# Patient Record
Sex: FEMALE | Race: WHITE | NOT HISPANIC OR LATINO | Employment: UNEMPLOYED | URBAN - METROPOLITAN AREA
[De-identification: names, ages, dates, MRNs, and addresses within clinical notes are randomized per-mention and may not be internally consistent; named-entity substitution may affect disease eponyms.]

---

## 2018-01-18 NOTE — PATIENT INSTRUCTIONS
[email or web address]  To help find a counselor.      Welcome to [facility], where we are committed to the art of inspired primary care.  Thank you for choosing us to be a part of your well-being.    The clinic is open Monday through Friday, 8:00 a.m. - 5:00 p.m., and Saturdays from 8:00 a.m. - 12:00 p.m.  After-hours questions are directed to our 24-hour nurse line, which can be reached by calling the clinic at [phone].  You can also contact the clinic through [employer], our online patient portal.  (Please allow 1-2 business days for a response via [employer]xt[employer].)  If you are not already enrolled in [employer], access instructions are below.    If you need a refill on your prescription, please contact the pharmacy where you filled it, and they will contact our clinic with the details of what is needed.  If your prescription is a controlled substance, you will have a conversation with your provider to determine if you would like to  your prescription at the clinic or have it mailed to your pharmacy.  Please allow 2-3 business days for all refill requests to be handled.    We look forward to providing you with great care!    Preventive Health Recommendations  Female Ages 26 - 39  Yearly exam:   See your health care provider every year in order to    Review health changes.     Discuss preventive care.      Review your medicines if you your doctor has prescribed any.    Until age 30: Get a Pap test every three years (more often if you have had an abnormal result).    After age 30: Talk to your doctor about whether you should have a Pap test every 3 years or have a Pap test with HPV screening every 5 years.   You do not need a Pap test if your uterus was removed (hysterectomy) and you have not had cancer.  You should be tested each year for STDs (sexually transmitted diseases), if you're at risk.   Talk to your provider about how often to have your cholesterol checked.  If you are at  risk for diabetes, you should have a diabetes test (fasting glucose).  Shots: Get a flu shot each year. Get a tetanus shot every 10 years.   Nutrition:     Eat at least 5 servings of fruits and vegetables each day.    Eat whole-grain bread, whole-wheat pasta and brown rice instead of white grains and rice.    Talk to your provider about Calcium and Vitamin D.     Lifestyle    Exercise at least 150 minutes a week (30 minutes a day, 5 days of the week). This will help you control your weight and prevent disease.    Limit alcohol to one drink per day.    No smoking.     Wear sunscreen to prevent skin cancer.    See your dentist every six months for an exam and cleaning.    Preparing for Pregnancy  Even before you become pregnant, your health matters to your future baby. Adopt good health habits today. And take care of any medical problems you have before becoming pregnant.  Remember: As soon as you know you are pregnant, get regular prenatal care.   Things to consider  Read through the list below. The more items that describe you, the healthier you may be.    I eat a balanced diet with fruits, vegetables, and whole grains    I keep physically active.    I have my health problems under control.    My weight is about right.    I don t smoke.    I don t use recreational drugs.    I don t have a drinking problem.  Think about the following:    Who will help you through pregnancy and with childcare?    Do you have health insurance?    Do you have the money needed to cover childcare and other day-to-day child expenses?    Will you be able to take the time you need away from your job for maternity needs and childcare?  Adopt a healthy lifestyle  Each of the following tips can improve your health as you prepare for pregnancy:    Take a daily vitamin supplement that contains iron and folic acid (a vitamin that reduces the chance of some birth defects)     Eat a high-fiber diet rich in fruits and vegetables.    Exercise 3 or  more times a week and at least 150 minutes weekly.    Get within 15 pounds of your ideal weight.  The first weeks of pregnancy are the most important time in a baby s development. Before you become pregnant:    Don t use recreational drugs.    Don t drink alcohol.    Don t smoke.  Working with your healthcare provider  Your healthcare provider can help answer any questions you may have. Do you know when to stop taking birth control pills? Are any over-the-counter medicines safe for pregnant women? You can also ask about special care you may need if you have any of the following:    Sexually transmitted diseases (STDs), like herpes or chlamydia    Diabetes    High blood pressure    Other chronic health problems   Date Last Reviewed: 8/16/2015 2000-2017 [employer]. [address] [place] [address], [place] [address]. All rights reserved. This information is not intended as a substitute for professional medical care. Always follow your healthcare professional's instructions.

## 2018-01-18 NOTE — PROGRESS NOTES
SUBJECTIVE:   CC: Sherita Glasgow is an 32 year old woman new patient who presents for preventive health visit.   Her last physical was about a year ago.  Her most recent medical care has been in [place].  KENIA signed.    She has the following concerns she would like addressed today:  1. Migraines--needs refill-see below    2.  Prepregnancy:  She and her  are considering pregnancy in the next year and she wonders if there are any tests she needs to have done.  PAP's have always been normal.  Currently using condoms for contraception. Periods are very regular and she has been using an daniel to track ovulation.    3. Depression and anxiety: Depression / Anxiety    Onset: Has always struggled with mood issues but with the significant lifestyle changes she has had recently she is really noting an issue with anxiety and depression. She has never been hospitalized. She would really like to get in to see a counselor but is very frustrated with trying to find one.    Description:         Depression: YES        Anxiety: YES  Any recent familial/socialchanges: job change and recent move  Still participating in activities that you used to enjoy: YES  Problems with sleep: no  Any thoughts of hurting yourself or others: none  Able to fulfill responsibilities: yes    Accompanying Signs & Symptoms:   Fatigue: YES  Irritability: YES  Difficulty concentrating: YES  Changes in appetite: no  Heart racing/beating fast (tachycardia): no  Shortness of breath: no  Numbness: no    Precipitating and/or Alleviating factors:    Worse when going to school or work: no  Able to leave home: yes  Able to go in crowds: yes  Alcohol/drug use: no    History:                   Family history of depression: YES                 Family history of Anxiety: YES  History of hospitalization for depression: no    Therapies tried, side effects and outcome: None       PHQ-9 done (score): YES--see below    VESNA done(score): YES          GYN  history:  Menarche:14  Periods: regular, lasting 4-5days.  Flow described as moderate to light  no dysmenorrhea, no Dysparuneia  Currently sexually active: yes    Contraception:  None--interested in pregnancy in the next year or so  Patient's last menstrual period was 2018 (exact date).  Vaginal symptoms: none  Concern for STD: no    Accepts/requests STD testing: declines  History of abnormal Pap smear: NO - age 30-65 PAP every 5 years with negative HPV co-testing recommended      Healthy Habits:    Do you get at least three servings of calcium containing foods daily (dairy, green leafy vegetables, etc.)? no, taking calcium and/or vitamin D supplement: recommended and info sheet given    Amount of exercise or daily activities, outside of work: 5 day(s) per week 40 minutes/time    Problems taking medications regularly not applicable    Medication side effects: No    Have you had an eye exam in the past two years? yes    Do you see a dentist twice per year? yes    Do you have sleep apnea, excessive snoring or daytime drowsiness?no      Migraine Follow-Up:  Requests refill of Maxalt. Much more frequently over the past month. Taking the maxalt every couple days right now. Usually needs to take only one.    Headaches symptoms:  Worsened. Worse with period and with recent move.    Frequency: 3 days weekly for which she always treat with Maxalt which gives full relief.     Duration of headaches: 1 hour when she takes the Maxalt.  Sometimes headache last 12 hours    Able to do normal daily activities/work with migraines: No--misses work due to her HA 1-2 times per month    Rescue/Relief medication:Maxalt              Effectiveness: total relief    Preventative medication: None.  Has been on preventive medication in the past but it dfid not help not sure which one.    Neurologic complications: No new stroke-like symptoms, loss of vision or speech, numbness or weakness    In the past 4 weeks, how often have you gone  to Urgent Care or the emergency room because of your headaches?  0    VESNA-7 SCORE 1/29/2018   Total Score 13     PHQ-9 SCORE 1/29/2018 1/29/2018   Total Score 11 6     Today's PHQ-2 Score:   PHQ-2 ( 1999 [employer]) 1/29/2018 1/29/2018   Q1: Little interest or pleasure in doing things 1 1   Q2: Feeling down, depressed or hopeless 1 2   PHQ-2 Score 2 3       Patient Active Problem List    Diagnosis Date Noted     Migraine without aura and without status migrainosus, not intractable 01/29/2018     Priority: Medium     Adjustment disorder with mixed anxiety and depressed mood 01/29/2018     Priority: Medium       History reviewed. No pertinent past medical history.    Past Surgical History:   Procedure Laterality Date     ANKLE SURGERY  2008     COLONOSCOPY  2009     HERNIA REPAIR  1994       Family History   Problem Relation Age of Onset     Lung Cancer Paternal Grandmother      nonsmoker     Alzheimer Disease Paternal Grandfather      Coronary Artery Disease Early Onset No family hx of      Hypertension No family hx of      Hyperlipidemia No family hx of        Social History   Substance Use Topics     Smoking status: Never Smoker     Smokeless tobacco: Never Used     Alcohol use 1.2 oz/week     2 Standard drinks or equivalent per week       Social History     Social History Narrative    Lives with . Just moved to [place] from [place] area where she finished grad school.  She now has a new job and works remotely for a company in [place].  Her  took a job here in [place] and he is from [place].        Has a good support system.    Feels safe in all environments.    Wears seatbelt 100% of the time    Wears helmet while biking.    Denies history of abuse, past or present, physical, sexual or emotional.    Kimberly Razo PA-C    01/29/18            .       Current Outpatient Prescriptions   Medication Sig Dispense Refill     VITAMIN D, CHOLECALCIFEROL, PO Take 2,000 Units by mouth daily       rizatriptan (MAXALT) 10 MG  "tablet Take 1 tablet (10 mg) by mouth as needed for migraine 36 tablet 3                          Reviewed orders with patient.  Reviewed health maintenance and updated orders accordingly - Yes  Lipid discussed and patient declined.  STD testing declined.    Reviewed and updated as needed this visit by clinical staff  Tobacco  Allergies  Meds  Problems  Med Hx  Surg Hx  Fam Hx  Soc Hx          Reviewed and updated as needed this visit by Provider  Tobacco  Problems  Med Hx  Surg Hx  Fam Hx  Soc Hx         ROS:  C: NEGATIVE for fever, chills, change in weight  I: NEGATIVE for worrisome rashes, moles or lesions  E: NEGATIVE for vision changes or irritation  ENT: NEGATIVE for ear, mouth and throat problems  R: NEGATIVE for significant cough or SOB  B: NEGATIVE for masses, tenderness or discharge  CV: NEGATIVE for chest pain, palpitations or peripheral edema  GI: NEGATIVE for nausea, abdominal pain, heartburn, or change in bowel habits  : NEGATIVE for unusual urinary or vaginal symptoms. Periods are regular.  M: NEGATIVE for significant arthralgias or myalgia  N: NEGATIVE for weakness, dizziness or paresthesias  NEURO: POSITIVE for migraines as above  E: NEGATIVE for temperature intolerance, skin/hair changes  H: NEGATIVE for bleeding problems  PSYCHIATRIC: as above    OBJECTIVE:   /77 (BP Location: Right arm, Patient Position: Sitting, Cuff Size: Adult Large)  Pulse 55  Temp 98.1  F (36.7  C) (Oral)  Ht 5' 6.75\" (169.5 cm)  Wt 138 lb (62.6 kg)  LMP 01/08/2018 (Exact Date)  SpO2 98%  Breastfeeding? No  BMI 21.78 kg/m2  EXAM:  GENERAL: healthy, alert and no distress  EYES: Eyes grossly normal to inspection, PERRL and conjunctivae and sclerae normal  HENT: ear canals and TM's normal, nose and mouth without ulcers or lesions. No bruits  NECK: no adenopathy, no asymmetry, masses, or scars and thyroid normal to palpation  RESP: lungs clear to auscultation - no rales, rhonchi or wheezes  BREAST: " normal without masses, tenderness or nipple discharge and no palpable axillary masses or adenopathy  CV: regular rate and rhythm, normal S1 S2, no S3 or S4, no murmur, click or rub, no peripheral edema and peripheral pulses strong  ABDOMEN: soft, nontender, no hepatosplenomegaly, no masses and bowel sounds normal  MS: no gross musculoskeletal defects noted, no clubbing, edema or cyanosis of extremities.Pulses = and appropriate bilaterally to DP and PT  SKIN: no suspicious lesions or rashes  NEURO: Normal strength and tone, mentation intact and speech normal  PSYCH: well dressed and groomed.  Good eye contact and is cooperative. Thoughts linear.  No delusions, compulsions or paranoia.  Affect flat and slightly tearful.  Patient denies homicidal and suicidal ideation as well as no thoughts or actions of self-harm.    LYMPH: no cervical, supraclavicular, axillary, or inguinal adenopathy    ASSESSMENT/PLAN:      Diagnosis Comments   1. Routine general medical examination at a health care facility  See patient instructions   2. Migraine without aura and without status migrainosus, not intractable  rizatriptan (MAXALT) 10 MG tablet Meds refilled. Discussed controller medications and encouraged patient to consider. Meds refilled   3. Adjustment disorder with mixed anxiety and depressed mood  Information on counseling services discussed as well as [email or web address]  Encouraged patient to consider medications if she is not feeling better.  Vitamin D recommended   4. Encounter for preconception consultation  Info sheet given and discussed.  Reviewed physiology of ovulation and best time  And ingterval for intercourse.   Folic acid recommended to be started 3 months BEFORE attempting conception.  Healthy diet with plenty of calcium, fruits and vegetables.  Get plenty of sleep.  Limit alcohol and caffeine.  Regular exercise.   Follow up if you have any worsening or persistent problems or concerns.        COUNSELING:   Reviewed  "preventive health counseling, as reflected in patient instructions  Special attention given to:        Regular exercise       Healthy diet/nutrition       Immunizations    Will review records on arrival.           Contraception       Family planning       Folic Acid Counseling       Osteoporosis Prevention/Bone Health         reports that she has never smoked. She has never used smokeless tobacco.    Estimated body mass index is 21.78 kg/(m^2) as calculated from the following:    Height as of this encounter: 5' 6.75\" (169.5 cm).    Weight as of this encounter: 138 lb (62.6 kg).       Counseling Resources:  ATP IV Guidelines  Pooled Cohorts Equation Calculator  Breast Cancer Risk Calculator  FRAX Risk Assessment  ICSI Preventive Guidelines  Dietary Guidelines for Americans, 2010  USDA's MyPlate  ASA Prophylaxis  Lung CA Screening    Margot Razo PA-C  [facility]  "

## 2018-01-29 ENCOUNTER — OFFICE VISIT (OUTPATIENT)
Dept: FAMILY MEDICINE | Facility: CLINIC | Age: 33
End: 2018-01-29
Payer: COMMERCIAL

## 2018-01-29 ENCOUNTER — HEALTH MAINTENANCE LETTER (OUTPATIENT)
Age: 33
End: 2018-01-29

## 2018-01-29 VITALS
HEIGHT: 67 IN | BODY MASS INDEX: 21.66 KG/M2 | HEART RATE: 55 BPM | SYSTOLIC BLOOD PRESSURE: 112 MMHG | TEMPERATURE: 98.1 F | WEIGHT: 138 LBS | DIASTOLIC BLOOD PRESSURE: 77 MMHG | OXYGEN SATURATION: 98 %

## 2018-01-29 DIAGNOSIS — Z00.00 ROUTINE GENERAL MEDICAL EXAMINATION AT A HEALTH CARE FACILITY: Primary | ICD-10-CM

## 2018-01-29 DIAGNOSIS — Z31.69 ENCOUNTER FOR PRECONCEPTION CONSULTATION: ICD-10-CM

## 2018-01-29 DIAGNOSIS — F43.23 ADJUSTMENT DISORDER WITH MIXED ANXIETY AND DEPRESSED MOOD: ICD-10-CM

## 2018-01-29 DIAGNOSIS — G43.009 MIGRAINE WITHOUT AURA AND WITHOUT STATUS MIGRAINOSUS, NOT INTRACTABLE: ICD-10-CM

## 2018-01-29 RX ORDER — RIZATRIPTAN BENZOATE 10 MG/1
10 TABLET ORAL PRN
Qty: 36 TABLET | Refills: 3 | Status: SHIPPED | OUTPATIENT
Start: 2018-01-29 | End: 2019-01-22

## 2018-01-29 ASSESSMENT — ANXIETY QUESTIONNAIRES
1. FEELING NERVOUS, ANXIOUS, OR ON EDGE: MORE THAN HALF THE DAYS
2. NOT BEING ABLE TO STOP OR CONTROL WORRYING: MORE THAN HALF THE DAYS
5. BEING SO RESTLESS THAT IT IS HARD TO SIT STILL: NOT AT ALL
3. WORRYING TOO MUCH ABOUT DIFFERENT THINGS: MORE THAN HALF THE DAYS
6. BECOMING EASILY ANNOYED OR IRRITABLE: NEARLY EVERY DAY
7. FEELING AFRAID AS IF SOMETHING AWFUL MIGHT HAPPEN: MORE THAN HALF THE DAYS
GAD7 TOTAL SCORE: 13
IF YOU CHECKED OFF ANY PROBLEMS ON THIS QUESTIONNAIRE, HOW DIFFICULT HAVE THESE PROBLEMS MADE IT FOR YOU TO DO YOUR WORK, TAKE CARE OF THINGS AT HOME, OR GET ALONG WITH OTHER PEOPLE: VERY DIFFICULT

## 2018-01-29 ASSESSMENT — PAIN SCALES - GENERAL: PAINLEVEL: MILD PAIN (3)

## 2018-01-29 ASSESSMENT — PATIENT HEALTH QUESTIONNAIRE - PHQ9: 5. POOR APPETITE OR OVEREATING: MORE THAN HALF THE DAYS

## 2018-01-29 NOTE — NURSING NOTE
"32 year old  Chief Complaint   Patient presents with     [facility]     Physical     had coffee       Blood pressure 112/77, pulse 55, temperature 98.1  F (36.7  C), temperature source Oral, height 5' 6.75\" (169.5 cm), weight 138 lb (62.6 kg), last menstrual period 01/08/2018, SpO2 98 %, not currently breastfeeding. Body mass index is 21.78 kg/(m^2).  There is no problem list on file for this patient.      Wt Readings from Last 2 Encounters:   01/29/18 138 lb (62.6 kg)     BP Readings from Last 3 Encounters:   01/29/18 112/77         Current Outpatient Prescriptions   Medication     Rizatriptan Benzoate (MAXALT PO)     VITAMIN D, CHOLECALCIFEROL, PO     No current facility-administered medications for this visit.        Social History   Substance Use Topics     Smoking status: Never Smoker     Smokeless tobacco: Never Used     Alcohol use Yes       Health Maintenance Due   Topic Date Due     TETANUS IMMUNIZATION (SYSTEM ASSIGNED)  09/06/2005     PAP SCREENING Q3 YR (SYSTEM ASSIGNED)  09/06/2008     INFLUENZA VACCINE (SYSTEM ASSIGNED)  09/01/2017       No results found for: MAAME [email or web address]  January 29, 2018 8:47 AM    "

## 2018-01-29 NOTE — MR AVS SNAPSHOT
After Visit Summary   1/29/2018    Sherita Glasgow    MRN: [id number]           Patient Information     Date Of Birth          1985        Visit Information        Provider Department      1/29/2018 8:40 AM Margot Razo PA-C [facility]        Today's Diagnoses     Routine general medical examination at a health care facility    -  1    Screening for cervical cancer        Screening for lipid disorders        Need for Tdap vaccination        Migraine without aura and without status migrainosus, not intractable          Care Instructions     [email or web address]  To help find a counselor.      Welcome to [facility], where we are committed to the art of inspired primary care.  Thank you for choosing us to be a part of your well-being.    The clinic is open Monday through Friday, 8:00 a.m. - 5:00 p.m., and Saturdays from 8:00 a.m. - 12:00 p.m.  After-hours questions are directed to our 24-hour nurse line, which can be reached by calling the clinic at [phone].  You can also contact the clinic through Stormpulse, our online patient portal.  (Please allow 1-2 business days for a response via Stormpulse.)  If you are not already enrolled in Stormpulse, access instructions are below.    If you need a refill on your prescription, please contact the pharmacy where you filled it, and they will contact our clinic with the details of what is needed.  If your prescription is a controlled substance, you will have a conversation with your provider to determine if you would like to  your prescription at the clinic or have it mailed to your pharmacy.  Please allow 2-3 business days for all refill requests to be handled.    We look forward to providing you with great care!    Preventive Health Recommendations  Female Ages 26 - 39  Yearly exam:   See your health care provider every year in order to    Review health changes.     Discuss preventive care.      Review your  medicines if you your doctor has prescribed any.    Until age 30: Get a Pap test every three years (more often if you have had an abnormal result).    After age 30: Talk to your doctor about whether you should have a Pap test every 3 years or have a Pap test with HPV screening every 5 years.   You do not need a Pap test if your uterus was removed (hysterectomy) and you have not had cancer.  You should be tested each year for STDs (sexually transmitted diseases), if you're at risk.   Talk to your provider about how often to have your cholesterol checked.  If you are at risk for diabetes, you should have a diabetes test (fasting glucose).  Shots: Get a flu shot each year. Get a tetanus shot every 10 years.   Nutrition:     Eat at least 5 servings of fruits and vegetables each day.    Eat whole-grain bread, whole-wheat pasta and brown rice instead of white grains and rice.    Talk to your provider about Calcium and Vitamin D.     Lifestyle    Exercise at least 150 minutes a week (30 minutes a day, 5 days of the week). This will help you control your weight and prevent disease.    Limit alcohol to one drink per day.    No smoking.     Wear sunscreen to prevent skin cancer.    See your dentist every six months for an exam and cleaning.    Preparing for Pregnancy  Even before you become pregnant, your health matters to your future baby. Adopt good health habits today. And take care of any medical problems you have before becoming pregnant.  Remember: As soon as you know you are pregnant, get regular prenatal care.   Things to consider  Read through the list below. The more items that describe you, the healthier you may be.    I eat a balanced diet with fruits, vegetables, and whole grains    I keep physically active.    I have my health problems under control.    My weight is about right.    I don t smoke.    I don t use recreational drugs.    I don t have a drinking problem.  Think about the following:    Who will help  you through pregnancy and with childcare?    Do you have health insurance?    Do you have the money needed to cover childcare and other day-to-day child expenses?    Will you be able to take the time you need away from your job for maternity needs and childcare?  Adopt a healthy lifestyle  Each of the following tips can improve your health as you prepare for pregnancy:    Take a daily vitamin supplement that contains iron and folic acid (a vitamin that reduces the chance of some birth defects)     Eat a high-fiber diet rich in fruits and vegetables.    Exercise 3 or more times a week and at least 150 minutes weekly.    Get within 15 pounds of your ideal weight.  The first weeks of pregnancy are the most important time in a baby s development. Before you become pregnant:    Don t use recreational drugs.    Don t drink alcohol.    Don t smoke.  Working with your healthcare provider  Your healthcare provider can help answer any questions you may have. Do you know when to stop taking birth control pills? Are any over-the-counter medicines safe for pregnant women? You can also ask about special care you may need if you have any of the following:    Sexually transmitted diseases (STDs), like herpes or chlamydia    Diabetes    High blood pressure    Other chronic health problems   Date Last Reviewed: 8/16/2015 2000-2017 [employer]. [address] [place] [address]. All rights reserved. This information is not intended as a substitute for professional medical care. Always follow your healthcare professional's instructions.                Follow-ups after your visit        Your next 10 appointments already scheduled     Feb 06, 2018  1:00 PM CST   New Patient Visit with BINH Schmitt CNP   [facility] ([facility])    [facility] [address],[place] [address] [facility] [address] [phone]              Who to contact      "Please call your clinic at [phone] to:    Ask questions about your health    Make or cancel appointments    Discuss your medicines    Learn about your test results    Speak to your doctor   If you have compliments or concerns about an experience at your clinic, or if you wish to file a complaint, please contact [facility] Patient Relations at [phone] or email us at [email or web address]         Additional Information About Your Visit        Phreesiahart Information     [employer] gives you secure access to your electronic health record. If you see a primary care provider, you can also send messages to your care team and make appointments. If you have questions, please call your primary care clinic.  If you do not have a primary care provider, please call [phone] and they will assist you.      [email or web address] is an electronic gateway that provides easy, online access to your medical records. With [email or web address], you can request a clinic appointment, read your test results, renew a prescription or communicate with your care team.     To access your existing account, please contact your [facility] or call [phone] for assistance.        Care EveryWhere ID     This is your Care EveryWhere ID. This could be used by other organizations to access your [facility] medical records  [id number]        Your Vitals Were     Pulse Temperature Height Last Period Pulse Oximetry Breastfeeding?    55 98.1  F (36.7  C) (Oral) 5' 6.75\" (169.5 cm) 01/08/2018 (Exact Date) 98% No    BMI (Body Mass Index)                   21.78 kg/m2            Blood Pressure from Last 3 Encounters:   01/29/18 112/77    Weight from Last 3 Encounters:   01/29/18 138 lb (62.6 kg)              Today, you had the following     No orders found for display         Today's Medication Changes          These changes are accurate as of 1/29/18  9:43 AM.  If you have any questions, ask your nurse or " doctor.               These medicines have changed or have updated prescriptions.        Dose/Directions    rizatriptan 10 MG tablet   Commonly known as:  MAXALT   This may have changed:  medication strength   Used for:  Migraine without aura and without status migrainosus, not intractable   Changed by:  Margot Razo PA-C        Dose:  10 mg   Take 1 tablet (10 mg) by mouth as needed for migraine   Quantity:  36 tablet   Refills:  3            Where to get your medicines      These medications were sent to [facility] [address] [place] [address] & [address] [address]     Phone:  [phone]     rizatriptan 10 MG tablet                Primary Care Provider Office Phone # Fax #    Margot Razo PA-C [phone]       [address] [place] [address]        Equal Access to Services     [facility]Y [facility] : Hadii ngozi gaytan hadasho Soomaali, waaxda luqadaha, qaybta kaalmada adeegyada, ashley fossin haysandy muro . So [facility] [phone].    ATENCIÓN: Si habla español, tiene a zhao disposición servicios gratuitos de asistencia lingüística. Kashif al [phone].    We comply with applicable federal civil rights laws and [place] laws. We do not discriminate on the basis of race, color, national origin, age, disability, sex, sexual orientation, or gender identity.            Thank you!     Thank you for choosing [facility]  for your care. Our goal is always to provide you with excellent care. Hearing back from our patients is one way we can continue to improve our services. Please take a few minutes to complete the written survey that you may receive in the mail after your visit with us. Thank you!             Your Updated Medication List - Protect others around you: Learn how to safely use, store and throw away your medicines at www.disposemymeds.org.          This list is accurate as of 1/29/18  9:43  AM.  Always use your most recent med list.                   Brand Name Dispense Instructions for use Diagnosis    rizatriptan 10 MG tablet    MAXALT    36 tablet    Take 1 tablet (10 mg) by mouth as needed for migraine    Migraine without aura and without status migrainosus, not intractable       VITAMIN D (CHOLECALCIFEROL) PO      Take 2,000 Units by mouth daily

## 2018-01-30 ASSESSMENT — PATIENT HEALTH QUESTIONNAIRE - PHQ9: SUM OF ALL RESPONSES TO PHQ QUESTIONS 1-9: 6

## 2018-01-30 ASSESSMENT — ANXIETY QUESTIONNAIRES: GAD7 TOTAL SCORE: 13

## 2018-02-20 ASSESSMENT — ENCOUNTER SYMPTOMS
INCREASED ENERGY: 1
BLOATING: 1
PANIC: 1
HEADACHES: 1
NERVOUS/ANXIOUS: 1
DEPRESSION: 1
DECREASED CONCENTRATION: 1
CONSTIPATION: 1
DIARRHEA: 1

## 2018-02-20 ASSESSMENT — ANXIETY QUESTIONNAIRES
1. FEELING NERVOUS, ANXIOUS, OR ON EDGE: MORE THAN HALF THE DAYS
GAD7 TOTAL SCORE: 10
5. BEING SO RESTLESS THAT IT IS HARD TO SIT STILL: NOT AT ALL
7. FEELING AFRAID AS IF SOMETHING AWFUL MIGHT HAPPEN: SEVERAL DAYS
2. NOT BEING ABLE TO STOP OR CONTROL WORRYING: SEVERAL DAYS
6. BECOMING EASILY ANNOYED OR IRRITABLE: NEARLY EVERY DAY
4. TROUBLE RELAXING: SEVERAL DAYS
7. FEELING AFRAID AS IF SOMETHING AWFUL MIGHT HAPPEN: SEVERAL DAYS
GAD7 TOTAL SCORE: 10
3. WORRYING TOO MUCH ABOUT DIFFERENT THINGS: MORE THAN HALF THE DAYS

## 2018-02-20 ASSESSMENT — PATIENT HEALTH QUESTIONNAIRE - PHQ9
SUM OF ALL RESPONSES TO PHQ QUESTIONS 1-9: 7
10. IF YOU CHECKED OFF ANY PROBLEMS, HOW DIFFICULT HAVE THESE PROBLEMS MADE IT FOR YOU TO DO YOUR WORK, TAKE CARE OF THINGS AT HOME, OR GET ALONG WITH OTHER PEOPLE: VERY DIFFICULT
SUM OF ALL RESPONSES TO PHQ QUESTIONS 1-9: 7

## 2018-02-23 ENCOUNTER — OFFICE VISIT (OUTPATIENT)
Dept: OBGYN | Facility: CLINIC | Age: 33
End: 2018-02-23
Attending: NURSE PRACTITIONER
Payer: COMMERCIAL

## 2018-02-23 VITALS
SYSTOLIC BLOOD PRESSURE: 118 MMHG | WEIGHT: 139.2 LBS | HEART RATE: 74 BPM | DIASTOLIC BLOOD PRESSURE: 78 MMHG | HEIGHT: 67 IN | BODY MASS INDEX: 21.85 KG/M2

## 2018-02-23 DIAGNOSIS — R53.83 FATIGUE, UNSPECIFIED TYPE: ICD-10-CM

## 2018-02-23 DIAGNOSIS — F43.23 ADJUSTMENT DISORDER WITH MIXED ANXIETY AND DEPRESSED MOOD: ICD-10-CM

## 2018-02-23 DIAGNOSIS — Z13.21 ENCOUNTER FOR VITAMIN DEFICIENCY SCREENING: ICD-10-CM

## 2018-02-23 DIAGNOSIS — Z13.220 SCREENING FOR LIPOID DISORDERS: ICD-10-CM

## 2018-02-23 DIAGNOSIS — Z01.419 ENCOUNTER FOR GYNECOLOGICAL EXAMINATION WITHOUT ABNORMAL FINDING: ICD-10-CM

## 2018-02-23 DIAGNOSIS — Z00.00 VISIT FOR PREVENTIVE HEALTH EXAMINATION: Primary | ICD-10-CM

## 2018-02-23 DIAGNOSIS — Z31.69 ENCOUNTER FOR PRECONCEPTION CONSULTATION: ICD-10-CM

## 2018-02-23 DIAGNOSIS — Z13.1 SCREENING FOR DIABETES MELLITUS: ICD-10-CM

## 2018-02-23 DIAGNOSIS — Z13.29 SCREENING FOR THYROID DISORDER: ICD-10-CM

## 2018-02-23 DIAGNOSIS — Z12.4 SCREENING FOR MALIGNANT NEOPLASM OF CERVIX: ICD-10-CM

## 2018-02-23 LAB
CHOLEST SERPL-MCNC: 161 MG/DL
DEPRECATED CALCIDIOL+CALCIFEROL SERPL-MC: 42 UG/L (ref 20–75)
GLUCOSE SERPL-MCNC: 91 MG/DL (ref 70–99)
HBA1C MFR BLD: 5.2 % (ref 4.3–6)
HDLC SERPL-MCNC: 75 MG/DL
HGB BLD-MCNC: 13.5 G/DL (ref 11.7–15.7)
LDLC SERPL CALC-MCNC: 77 MG/DL
NONHDLC SERPL-MCNC: 86 MG/DL
TRIGL SERPL-MCNC: 44 MG/DL
TSH SERPL DL<=0.005 MIU/L-ACNC: 1.54 MU/L (ref 0.4–4)
VIT B12 SERPL-MCNC: 284 PG/ML (ref 193–986)

## 2018-02-23 PROCEDURE — 82947 ASSAY GLUCOSE BLOOD QUANT: CPT | Performed by: NURSE PRACTITIONER

## 2018-02-23 PROCEDURE — 82607 VITAMIN B-12: CPT | Performed by: NURSE PRACTITIONER

## 2018-02-23 PROCEDURE — G0463 HOSPITAL OUTPT CLINIC VISIT: HCPCS | Mod: ZF

## 2018-02-23 PROCEDURE — G0145 SCR C/V CYTO,THINLAYER,RESCR: HCPCS | Performed by: NURSE PRACTITIONER

## 2018-02-23 PROCEDURE — 84443 ASSAY THYROID STIM HORMONE: CPT | Performed by: NURSE PRACTITIONER

## 2018-02-23 PROCEDURE — 85018 HEMOGLOBIN: CPT | Performed by: NURSE PRACTITIONER

## 2018-02-23 PROCEDURE — 87624 HPV HI-RISK TYP POOLED RSLT: CPT | Performed by: NURSE PRACTITIONER

## 2018-02-23 PROCEDURE — 83036 HEMOGLOBIN GLYCOSYLATED A1C: CPT | Performed by: NURSE PRACTITIONER

## 2018-02-23 PROCEDURE — 82306 VITAMIN D 25 HYDROXY: CPT | Performed by: NURSE PRACTITIONER

## 2018-02-23 PROCEDURE — 80061 LIPID PANEL: CPT | Performed by: NURSE PRACTITIONER

## 2018-02-23 PROCEDURE — 36415 COLL VENOUS BLD VENIPUNCTURE: CPT | Performed by: NURSE PRACTITIONER

## 2018-02-23 ASSESSMENT — ANXIETY QUESTIONNAIRES
5. BEING SO RESTLESS THAT IT IS HARD TO SIT STILL: NOT AT ALL
1. FEELING NERVOUS, ANXIOUS, OR ON EDGE: NEARLY EVERY DAY
6. BECOMING EASILY ANNOYED OR IRRITABLE: NEARLY EVERY DAY
2. NOT BEING ABLE TO STOP OR CONTROL WORRYING: NEARLY EVERY DAY
3. WORRYING TOO MUCH ABOUT DIFFERENT THINGS: NEARLY EVERY DAY
GAD7 TOTAL SCORE: 16
7. FEELING AFRAID AS IF SOMETHING AWFUL MIGHT HAPPEN: SEVERAL DAYS

## 2018-02-23 ASSESSMENT — PATIENT HEALTH QUESTIONNAIRE - PHQ9: 5. POOR APPETITE OR OVEREATING: NEARLY EVERY DAY

## 2018-02-23 NOTE — PROGRESS NOTES
"  Progress Note    SUBJECTIVE:  Sherita Glasgow is an 32 year old, , who requests an Annual Gyn Exam.   Patient had an annual preventative exam with breast exam done with her PCP on 2018.      Concerns today include:   1. Preconception counseling: Considering a pregnancy in the next year.  Currently using condoms, NFP and the pull out method.  Menses are regular (see hx below).    2. Hx of anxiety and depression.   PHQ-9 SCORE 2018   Total Score MyChart - 7 (Mild depression) -   Total Score 6 7 8     VESNA-7 SCORE 2018   Total Score - 10 (moderate anxiety) -   Total Score 13 10 16     Reports feeling fatigued and like she \"could sleep all the time.\"  Sleeps well during the night; denies trouble falling or staying asleep.  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  She and her  are getting settled in their home.  Her 's family lives in [place]. Expresses sadness for leaving her family who lives on the [facility].  Feels safe. Denies suicidal thoughts.  Plans to establish care with a psychologist; has this appointment scheduled.  Never been on medication to control symptoms, but starting to feel open to this.      Menstrual History:  Menstrual History 2018   LAST MENSTRUAL PERIOD 2018 -   Menarche age - - 18   Period Cycle (Days) - - Q 3.5 weeks   Period Duration (Days) - - 4 days   Method of Contraception - - None   Period Pattern - - Regular   Menstrual Flow - - Moderate   Dysmenorrhea - - None   Reviewed Today - - Yes     Last pap smear: pt unsure, done in [place]  History of abnormal Pap smear: NO - age 30-65 PAP every 5 years with negative HPV co-testing recommended    Mammogram current: n/a  Last Colonoscopy: n/a    Sexual Hx: denies sexual concerns or " dyspareunia    HISTORY:    Current Outpatient Prescriptions on File Prior to Visit:  VITAMIN D, CHOLECALCIFEROL, PO Take 2,000 Units by mouth daily   rizatriptan (MAXALT) 10 MG tablet Take 1 tablet (10 mg) by mouth as needed for migraine     No current facility-administered medications on file prior to visit.   No Known Allergies    There is no immunization history on file for this patient.  S/p flu vaccine  Obstetric History       T0      L0     SAB0   TAB0   Ectopic0   Multiple0   Live Births0      Past Medical History:   Diagnosis Date     Migraines     without aura   (takes Maxalt PRN)    Past Surgical History:   Procedure Laterality Date     ANKLE SURGERY       COLONOSCOPY      normal result; done due to digestive issues     HERNIA REPAIR       Family History   Problem Relation Age of Onset     Lung Cancer Paternal Grandmother      nonsmoker     Breast Cancer Paternal Grandmother      over age 50     Alzheimer Disease Paternal Grandfather      Coronary Artery Disease Early Onset No family hx of      Hypertension No family hx of      Hyperlipidemia No family hx of      DIABETES No family hx of      Social History     Social History     Marital status:      Spouse name: N/A     Number of children: N/A     Years of education: N/A     Social History Main Topics     Smoking status: Never Smoker     Smokeless tobacco: Never Used     Alcohol use 1.2 oz/week      Comment: 3-5 drinks/week     Drug use: No     Sexual activity: Yes     Partners: Male     Birth control/ protection: Pull-out method     Other Topics Concern     Parent/Sibling W/ [place], Mi Or Angioplasty Before 65f 55m? No     Social History Narrative    Lives with . Just moved to [place] from [place] area where she finished grad school.  She now has a new job and works remotely for a company in [place].  Her  took a job here in [place] and he is from [place].        Has a good support system.    Feels safe in all  "environments.    Wears seatbelt 100% of the time    Wears helmet while biking.    Denies history of abuse, past or present, physical, sexual or emotional.    Kimberly Razo PA-C    01/29/18         ROS  ROS: 10 point ROS neg other than the symptoms noted above in the HPI.      EXAM:  Blood pressure 118/78, pulse 74, height 1.695 m (5' 6.75\"), weight 63.1 kg (139 lb 3.2 oz), last menstrual period 02/02/2018, not currently breastfeeding. Body mass index is 21.97 kg/(m^2).  General - pleasant female in no acute distress.  Musculoskeletal - no gross deformities.  Neurological - normal strength, sensation, and mental status.    Pelvic Exam:  EG/BUS: Normal genital architecture without lesions, erythema or abnormal secretions; Bartholin's, Urethra, Antonito's normal   Urethral meatus: normal   Urethra: no masses, tenderness, or scarring   Bladder: no masses or tenderness   Vagina: moist, pink, rugae with creamy, white and odorless secretions  Cervix: Nulliparous, and pink, moist, closed, without lesion or CMT  Uterus: anteverted,  and small, smooth, firm, mobile w/o pain  Adnexa: Within normal limits and No masses, nodularity, tenderness  Rectum: anus normal     ASSESSMENT:  Encounter Diagnoses   Name Primary?     Visit for preventive health examination Yes     Screening for diabetes mellitus      Screening for lipoid disorders      Encounter for vitamin deficiency screening      Screening for malignant neoplasm of cervix      Encounter for gynecological examination without abnormal finding      Screening for thyroid disorder      Adjustment disorder with mixed anxiety and depressed mood      Fatigue, unspecified type      Encounter for preconception consultation         PLAN:   Orders Placed This Encounter   Procedures     Pelvic and Breast Exam Procedure []     Pap Smear Exam [] Do Not Remove     Pap imaged thin layer screen with HPV - recommended age 30 - 65 years (select HPV order below)     HPV High Risk Types " DNA Cervical     Hemoglobin A1c [LAB90]     Fasting Glucose [UEE0926]     TSH with free T4 reflex     Lipid panel reflex to direct LDL Fasting     25- OH-Vitamin D     Vitamin B12     Hemoglobin     Preventative health labs drawn, as well as labs to evaluate for cause of fatigue.   Preconception counseling completed. Encouraged Sherita to start taking a prenatal vitamin daily.  Counseled Sherita that Maxalt is a Category C medication in pregnancy; when she becomes pregnant, can readdress with provider whether she should continue this medication or if there is a better alternative, if it is still needed.  Instructed Sherita to abstain from alcohol when pregnant.   Pap smear with HPV testing completed today.  Patient declined STD testing today.   Discussed mental health with Sherita. She reports that she feels her symptoms are worsening given so many changes, adjusting to a new setting, and being away from her family.  Sherita declined medication therapy at this time. She desires to start psychotherapy at this time. Patient to consider returning to clinic in 2 months for mental health follow-up, or sooner, if she desires to initiate medication.  Additional teaching done at this visit regarding calcium (1200 mg per day), self breast exam, exercise, mental health and weight/diet.    Return to clinic in one year or if she achieves pregnancy.  Follow-up as needed.    Haley Arias, DNP, APRN, WHNP

## 2018-02-23 NOTE — PATIENT INSTRUCTIONS
Please go to the lab after today's appointment to have your labs drawn  A pap smear with HPV testing was done today. If this pap smear is normal, your next pap smear will be due in 5 years.  Encouraged initiation of care with a psychologist, as you have scheduled. Considering returning to clinic in 2 months to check in on your mental health symptoms. May return sooner if you desire initiation of a medication.    It is recommended that you start a prenatal vitamin today and continue taking 1 daily through pregnancy and postpartum.   Return to clinic when you have achieved a pregnancy (around 8 weeks) or in 1 year for a preventative health exam.     PREVENTIVE HEALTH RECOMMENDATIONS:   Most women need a yearly breast and pelvic exam.    A PAP screen, a test done DURING a pelvic exam, is NO longer recommended yearly.    March 2013, screening guidelines recommended by ACOG for PAP screen are:    1) First pap at age 21.    2) Pap every 3 years until age 30.    3) After age 30, pap every 3 years or Pap with HR HPV screen every 5 years until age 65.  4) Women do NOT need a vaginal Pap screen after a hysterectomy (surgical removal of the uterus) when they have not had cancer.    Exceptions:  1) Yearly pap if HIV+ or immunosuppressed secondary to organ transplant  2) ANTHONY II-III continue routine screening for 20 years.    I encourage you continue looking for opportunities to choose a healthy lifestyle:       * Choose to eat a heart healthy diet. Check out the FOOD PLATE guidelines at: http://www.choosemyplate.gov/ for helpful hints on weight and cholesterol management.  Balance your caloric intake with exercise to maintain a BMI in the 22 to 26 range. For bone health: Eat calcium-rich foods like yogurt, broccoli or take chewable calcium pills (500 to 600 mg) twice a day with food.       * Exercise for at least an average of 30 minutes a day, 5 days of the week. This will help you control your weight, release stress, and help  prevent disease.      * Take a Vitamin D3 supplement daily fall through spring and during summer unless you [id number]' full body sun exposure to skin without sunscreen.      * DO wear sunscreen to prevent skin cancer after the first 15-30 minutes.      * Identify stressors in your life, find ways to release the stress, and, make time for yourself. PLEASE ask for help if mood changes last longer than two weeks.     * Limit alcohol to one drink per day.  No smoking.  Avoid second hand smoke. If you smoke, ask for help to stop.       *  If you are in a sexual relationship, talk with your partner about possible infection risks and take action to protect yourself from exposure to a sexual infection.    Please request an infection screen for STIs (sexually transmitted infections) if you are less than age 26 OR believe that you may be at risk.     Get a flu shot each year. Get a tetanus shot every 10 years. EVERYONE needs a pertussis (Whooping cough) booster.    See your dentist twice a year for an exam and preventive care cleaning.     Consider the following screen tests:    1) cholesterol test every 5 years.     2) yearly mammogram after age 40 unless you have identified risks.    3) colonoscopy every 10 years after age 50 unless you have identified risks.    4) diabetes blood test screening if you are at risk for diabetes.      Additional information that you may also find helpful:  The Internet now gives us access to LOTS of information -- some of it helpful, research documented and also plenty of harmful, anecdotal information that may not pertain to your situtaion. Consider visiting the following websites for accurate health information:    www.vitamindcouncil.org/ : Info and ongoing research re Vitamin D    www.fairview.org : Up to date and easily searchable information on multiple topics.    www.medlineplus.gov : medication info, interactive tutorials, watch real surgeries online    www.cdc.gov : public health  info, travel advisories, epidemics (H1N1)    www.binu/std.org: current research re diagnosis, treatment and prevention of sexually contacted infections.    www.health.[facility].[place] : [place] dept of heatlh, public health issues in [place], N1N1    www.familydoctor.org : good info from the Academy of Family Physicians

## 2018-02-23 NOTE — LETTER
"2018       RE: Sherita Glasgow  [address]  [address]  [place] [address]     Dear Colleague,    Thank you for referring your patient, Sherita Glasgow, to the [facility] at [facility]. Please see a copy of my visit note below.      Progress Note    SUBJECTIVE:  Sherita Glasgow is an 32 year old, , who requests an Annual Gyn Exam.   Patient had an annual preventative exam with breast exam done with her PCP on 2018.      Concerns today include:   1. Preconception counseling: Considering a pregnancy in the next year.  Currently using condoms, NFP and the pull out method.  Menses are regular (see hx below).    2. Hx of anxiety and depression.   PHQ-9 SCORE 2018   Total Score MyChart - 7 (Mild depression) -   Total Score 6 7 8     VESNA-7 SCORE 2018   Total Score - 10 (moderate anxiety) -   Total Score 13 10 16     Reports feeling fatigued and like she \"could sleep all the time.\"  Sleeps well during the night; denies trouble falling or staying asleep.  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  She and her  are getting settled in their home.  Her 's family lives in [place]. Expresses sadness for leaving her family who lives on the [facility].  Feels safe. Denies suicidal thoughts.  Plans to establish care with a psychologist; has this appointment scheduled.  Never been on medication to control symptoms, but starting to feel open to this.      Menstrual History:  Menstrual History 2018   LAST MENSTRUAL PERIOD 2018 -   Menarche age - - 18   Period Cycle (Days) - - Q 3.5 weeks   Period Duration (Days) - - 4 days   Method of Contraception - - None   Period Pattern - - Regular   Menstrual Flow - - Moderate "   Dysmenorrhea - - None   Reviewed Today - - Yes     Last pap smear: pt unsure, done in [place]  History of abnormal Pap smear: NO - age 30-65 PAP every 5 years with negative HPV co-testing recommended    Mammogram current: n/a  Last Colonoscopy: n/a    Sexual Hx: denies sexual concerns or dyspareunia    HISTORY:    Current Outpatient Prescriptions on File Prior to Visit:  VITAMIN D, CHOLECALCIFEROL, PO Take 2,000 Units by mouth daily   rizatriptan (MAXALT) 10 MG tablet Take 1 tablet (10 mg) by mouth as needed for migraine     No current facility-administered medications on file prior to visit.   No Known Allergies    There is no immunization history on file for this patient.  S/p flu vaccine  Obstetric History       T0      L0     SAB0   TAB0   Ectopic0   Multiple0   Live Births0      Past Medical History:   Diagnosis Date     Migraines     without aura   (takes Maxalt PRN)    Past Surgical History:   Procedure Laterality Date     ANKLE SURGERY       COLONOSCOPY      normal result; done due to digestive issues     HERNIA REPAIR       Family History   Problem Relation Age of Onset     Lung Cancer Paternal Grandmother      nonsmoker     Breast Cancer Paternal Grandmother      over age 50     Alzheimer Disease Paternal Grandfather      Coronary Artery Disease Early Onset No family hx of      Hypertension No family hx of      Hyperlipidemia No family hx of      DIABETES No family hx of      Social History     Social History     Marital status:      Spouse name: N/A     Number of children: N/A     Years of education: N/A     Social History Main Topics     Smoking status: Never Smoker     Smokeless tobacco: Never Used     Alcohol use 1.2 oz/week      Comment: 3-5 drinks/week     Drug use: No     Sexual activity: Yes     Partners: Male     Birth control/ protection: Pull-out method     Other Topics Concern     Parent/Sibling W/ Cabg, Mi Or Angioplasty Before 65f 55m? No     Social History  "Narrative    Lives with . Just moved to [place] from [place] area where she finished grad school.  She now has a new job and works remotely for a company in [place].  Her  took a job here in [place] and he is from [place].        Has a good support system.    Feels safe in all environments.    Wears seatbelt 100% of the time    Wears helmet while biking.    Denies history of abuse, past or present, physical, sexual or emotional.    Kimberly Razo PA-C    01/29/18         ROS  ROS: 10 point ROS neg other than the symptoms noted above in the HPI.      EXAM:  Blood pressure 118/78, pulse 74, height 1.695 m (5' 6.75\"), weight 63.1 kg (139 lb 3.2 oz), last menstrual period 02/02/2018, not currently breastfeeding. Body mass index is 21.97 kg/(m^2).  General - pleasant female in no acute distress.  Musculoskeletal - no gross deformities.  Neurological - normal strength, sensation, and mental status.    Pelvic Exam:  EG/BUS: Normal genital architecture without lesions, erythema or abnormal secretions; Bartholin's, Urethra, Iron Horse's normal   Urethral meatus: normal   Urethra: no masses, tenderness, or scarring   Bladder: no masses or tenderness   Vagina: moist, pink, rugae with creamy, white and odorless secretions  Cervix: Nulliparous, and pink, moist, closed, without lesion or CMT  Uterus: anteverted,  and small, smooth, firm, mobile w/o pain  Adnexa: Within normal limits and No masses, nodularity, tenderness  Rectum: anus normal     ASSESSMENT:  Encounter Diagnoses   Name Primary?     Visit for preventive health examination Yes     Screening for diabetes mellitus      Screening for lipoid disorders      Encounter for vitamin deficiency screening      Screening for malignant neoplasm of cervix      Encounter for gynecological examination without abnormal finding      Screening for thyroid disorder      Adjustment disorder with mixed anxiety and depressed mood      Fatigue, unspecified type      Encounter for preconception " consultation         PLAN:   Orders Placed This Encounter   Procedures     Pelvic and Breast Exam Procedure []     Pap Smear Exam [] Do Not Remove     Pap imaged thin layer screen with HPV - recommended age 30 - 65 years (select HPV order below)     HPV High Risk Types DNA Cervical     Hemoglobin A1c [LAB90]     Fasting Glucose [LLK0134]     TSH with free T4 reflex     Lipid panel reflex to direct LDL Fasting     25- OH-Vitamin D     Vitamin B12     Hemoglobin     Preventative health labs drawn, as well as labs to evaluate for cause of fatigue.   Preconception counseling completed. Encouraged Sherita to start taking a prenatal vitamin daily.  Counseled Sherita that Maxalt is a Category C medication in pregnancy; when she becomes pregnant, can readdress with provider whether she should continue this medication or if there is a better alternative, if it is still needed.  Instructed Sherita to abstain from alcohol when pregnant.   Pap smear with HPV testing completed today.  Patient declined STD testing today.   Discussed mental health with Sherita. She reports that she feels her symptoms are worsening given so many changes, adjusting to a new setting, and being away from her family.  Sherita declined medication therapy at this time. She desires to start psychotherapy at this time. Patient to consider returning to clinic in 2 months for mental health follow-up, or sooner, if she desires to initiate medication.  Additional teaching done at this visit regarding calcium (1200 mg per day), self breast exam, exercise, mental health and weight/diet.    Return to clinic in one year or if she achieves pregnancy.  Follow-up as needed.      Again, thank you for allowing me to participate in the care of your patient.      Sincerely,    BINH Pavon CNP

## 2018-02-23 NOTE — MR AVS SNAPSHOT
After Visit Summary   2/23/2018    Sherita Glasgow    MRN: [id number]           Patient Information     Date Of Birth          1985        Visit Information        Provider Department      2/23/2018 8:00 AM Haley Arias APRN CNP [facility]        Today's Diagnoses     Visit for preventive health examination        Screening for diabetes mellitus        Screening for lipoid disorders        Encounter for vitamin deficiency screening        Screening for malignant neoplasm of cervix        Encounter for gynecological examination without abnormal finding        Screening for thyroid disorder          Care Instructions    Please go to the lab after today's appointment to have your labs drawn  A pap smear with HPV testing was done today. If this pap smear is normal, your next pap smear will be due in 5 years.  Encouraged initiation of care with a psychologist, as you have scheduled. Considering returning to clinic in 2 months to check in on your mental health symptoms. May return sooner if you desire initiation of a medication.    It is recommended that you start a prenatal vitamin today and continue taking 1 daily through pregnancy and postpartum.   Return to clinic when you have achieved a pregnancy (around 8 weeks) or in 1 year for a preventative health exam.     PREVENTIVE HEALTH RECOMMENDATIONS:   Most women need a yearly breast and pelvic exam.    A PAP screen, a test done DURING a pelvic exam, is NO longer recommended yearly.    March 2013, screening guidelines recommended by ACOG for PAP screen are:    1) First pap at age 21.    2) Pap every 3 years until age 30.    3) After age 30, pap every 3 years or Pap with HR HPV screen every 5 years until age 65.  4) Women do NOT need a vaginal Pap screen after a hysterectomy (surgical removal of the uterus) when they have not had cancer.    Exceptions:  1) Yearly pap if HIV+ or immunosuppressed secondary to organ  transplant  2) ANTHONY II-III continue routine screening for 20 years.    I encourage you continue looking for opportunities to choose a healthy lifestyle:       * Choose to eat a heart healthy diet. Check out the FOOD PLATE guidelines at: http://www.choosemyplate.gov/ for helpful hints on weight and cholesterol management.  Balance your caloric intake with exercise to maintain a BMI in the 22 to 26 range. For bone health: Eat calcium-rich foods like yogurt, broccoli or take chewable calcium pills (500 to 600 mg) twice a day with food.       * Exercise for at least an average of 30 minutes a day, 5 days of the week. This will help you control your weight, release stress, and help prevent disease.      * Take a Vitamin D3 supplement daily fall through spring and during summer unless you [id number]' full body sun exposure to skin without sunscreen.      * DO wear sunscreen to prevent skin cancer after the first 15-30 minutes.      * Identify stressors in your life, find ways to release the stress, and, make time for yourself. PLEASE ask for help if mood changes last longer than two weeks.     * Limit alcohol to one drink per day.  No smoking.  Avoid second hand smoke. If you smoke, ask for help to stop.       *  If you are in a sexual relationship, talk with your partner about possible infection risks and take action to protect yourself from exposure to a sexual infection.    Please request an infection screen for STIs (sexually transmitted infections) if you are less than age 26 OR believe that you may be at risk.     Get a flu shot each year. Get a tetanus shot every 10 years. EVERYONE needs a pertussis (Whooping cough) booster.    See your dentist twice a year for an exam and preventive care cleaning.     Consider the following screen tests:    1) cholesterol test every 5 years.     2) yearly mammogram after age 40 unless you have identified risks.    3) colonoscopy every 10 years after age 50 unless you have identified  risks.    4) diabetes blood test screening if you are at risk for diabetes.      Additional information that you may also find helpful:  The Internet now gives us access to LOTS of information -- some of it helpful, research documented and also plenty of harmful, anecdotal information that may not pertain to your situtaion. Consider visiting the following websites for accurate health information:    www.vitamindcouncil.org/ : Info and ongoing research re Vitamin D    www.fairview.org : Up to date and easily searchable information on multiple topics.    www.medlineplus.gov : medication info, interactive tutorials, watch real surgeries online    www.cdc.gov : public health info, travel advisories, epidemics (H1N1)    www.binu/std.org: current research re diagnosis, treatment and prevention of sexually contacted infections.    www.health.[facility].[place] : [place] dept of heat, public health issues in [place], N1N1    www.familydoctor.org : good info from the Academy of Family Physicians                Follow-ups after your visit        Follow-up notes from your care team     Return in 1 year (on 2/23/2019) for Preventative Health Visit.      Who to contact     Please call your clinic at [phone] to:    Ask questions about your health    Make or cancel appointments    Discuss your medicines    Learn about your test results    Speak to your doctor            Additional Information About Your Visit        IroFit Information     IroFit gives you secure access to your electronic health record. If you see a primary care provider, you can also send messages to your care team and make appointments. If you have questions, please call your primary care clinic.  If you do not have a primary care provider, please call [phone] and they will assist you.      IroFit is an electronic gateway that provides easy, online access to your medical records. With IroFit, you can request a clinic appointment, read your test results, renew a  "prescription or communicate with your care team.     To access your existing account, please contact your [facility] or call [phone] for assistance.        Care EveryWhere ID     This is your Care EveryWhere ID. This could be used by other organizations to access your [facility] medical records  [id number]        Your Vitals Were     Pulse Height Last Period Breastfeeding? BMI (Body Mass Index)       74 1.695 m (5' 6.75\") 02/02/2018 (Approximate) No 21.97 kg/m2        Blood Pressure from Last 3 Encounters:   02/23/18 118/78   01/29/18 112/77    Weight from Last 3 Encounters:   02/23/18 63.1 kg (139 lb 3.2 oz)   01/29/18 62.6 kg (138 lb)              We Performed the Following     25- OH-Vitamin D     Fasting Glucose [MKH8274]     Hemoglobin A1c [LAB90]     Hemoglobin     HPV High Risk Types DNA Cervical     Lipid panel reflex to direct LDL Fasting     Pap imaged thin layer screen with HPV - recommended age 30 - 65 years (select HPV order below)     Pap Smear Exam [] Do Not Remove     Pelvic and Breast Exam Procedure []     TSH with free T4 reflex     Vitamin B12        Primary Care Provider Office Phone # Fax #    Margot Razo PA-C [phone]       [address] [place] [address]        Equal Access to Services     DIANA HADDAD : Hadii ngozi gaytan hadasho Soleticiaali, waaxda luqadaha, qaybta kaalmada adeegyada, ashley muro . So [facility] [phone].    ATENCIÓN: Si habla español, tiene a zhao disposición servicios gratuitos de asistencia lingüística. Llame al [phone].    We comply with applicable federal civil rights laws and [place] laws. We do not discriminate on the basis of race, color, national origin, age, disability, sex, sexual orientation, or gender identity.            Thank you!     Thank you for choosing [facility]  for your care. Our goal is always to provide you with excellent " care. Hearing back from our patients is one way we can continue to improve our services. Please take a few minutes to complete the written survey that you may receive in the mail after your visit with us. Thank you!             Your Updated Medication List - Protect others around you: Learn how to safely use, store and throw away your medicines at www.disposemymeds.org.          This list is accurate as of 2/23/18  8:59 AM.  Always use your most recent med list.                   Brand Name Dispense Instructions for use Diagnosis    rizatriptan 10 MG tablet    MAXALT    36 tablet    Take 1 tablet (10 mg) by mouth as needed for migraine    Migraine without aura and without status migrainosus, not intractable       VITAMIN D (CHOLECALCIFEROL) PO      Take 2,000 Units by mouth daily

## 2018-02-27 LAB
COPATH REPORT: NORMAL
PAP: NORMAL

## 2018-03-01 LAB
FINAL DIAGNOSIS: NORMAL
HPV HR 12 DNA CVX QL NAA+PROBE: NEGATIVE
HPV16 DNA SPEC QL NAA+PROBE: NEGATIVE
HPV18 DNA SPEC QL NAA+PROBE: NEGATIVE
SPECIMEN DESCRIPTION: NORMAL
SPECIMEN SOURCE CVX/VAG CYTO: NORMAL

## 2018-04-26 ENCOUNTER — TELEPHONE (OUTPATIENT)
Dept: OBGYN | Facility: CLINIC | Age: 33
End: 2018-04-26

## 2018-04-26 DIAGNOSIS — Z34.91 NORMAL PREGNANCY IN FIRST TRIMESTER: Primary | ICD-10-CM

## 2018-04-26 NOTE — TELEPHONE ENCOUNTER
Patient called and would like to establish prenatal care   Patient LMP 3/10/18  Patient G1 P  Patient complains of having breast tenderness and nausea  Patient is taking prenatal vitamins.  Order for dating ultrasound entered.

## 2018-05-11 ENCOUNTER — OFFICE VISIT (OUTPATIENT)
Dept: OBGYN | Facility: CLINIC | Age: 33
End: 2018-05-11
Attending: ADVANCED PRACTICE MIDWIFE
Payer: COMMERCIAL

## 2018-05-11 VITALS
WEIGHT: 138.7 LBS | HEIGHT: 67 IN | HEART RATE: 80 BPM | DIASTOLIC BLOOD PRESSURE: 76 MMHG | BODY MASS INDEX: 21.77 KG/M2 | SYSTOLIC BLOOD PRESSURE: 127 MMHG

## 2018-05-11 DIAGNOSIS — Z34.91 NORMAL PREGNANCY IN FIRST TRIMESTER: ICD-10-CM

## 2018-05-11 DIAGNOSIS — Z34.00 SUPERVISION OF NORMAL FIRST PREGNANCY, ANTEPARTUM: Primary | ICD-10-CM

## 2018-05-11 LAB
ABO + RH BLD: NORMAL
ABO + RH BLD: NORMAL
BASOPHILS # BLD AUTO: 0 10E9/L (ref 0–0.2)
BASOPHILS NFR BLD AUTO: 0.4 %
BLD GP AB SCN SERPL QL: NORMAL
BLOOD BANK CMNT PATIENT-IMP: NORMAL
DEPRECATED CALCIDIOL+CALCIFEROL SERPL-MC: 32 UG/L (ref 20–75)
DIFFERENTIAL METHOD BLD: NORMAL
EOSINOPHIL # BLD AUTO: 0.1 10E9/L (ref 0–0.7)
EOSINOPHIL NFR BLD AUTO: 1.2 %
ERYTHROCYTE [DISTWIDTH] IN BLOOD BY AUTOMATED COUNT: 12.1 % (ref 10–15)
HBV SURFACE AB SERPL IA-ACNC: 59.59 M[IU]/ML
HBV SURFACE AG SERPL QL IA: NONREACTIVE
HCT VFR BLD AUTO: 37.4 % (ref 35–47)
HGB BLD-MCNC: 12.8 G/DL (ref 11.7–15.7)
HIV 1+2 AB+HIV1 P24 AG SERPL QL IA: NONREACTIVE
IMM GRANULOCYTES # BLD: 0 10E9/L (ref 0–0.4)
IMM GRANULOCYTES NFR BLD: 0.4 %
LYMPHOCYTES # BLD AUTO: 0.9 10E9/L (ref 0.8–5.3)
LYMPHOCYTES NFR BLD AUTO: 18.5 %
MCH RBC QN AUTO: 30.8 PG (ref 26.5–33)
MCHC RBC AUTO-ENTMCNC: 34.2 G/DL (ref 31.5–36.5)
MCV RBC AUTO: 90 FL (ref 78–100)
MONOCYTES # BLD AUTO: 0.5 10E9/L (ref 0–1.3)
MONOCYTES NFR BLD AUTO: 8.8 %
NEUTROPHILS # BLD AUTO: 3.6 10E9/L (ref 1.6–8.3)
NEUTROPHILS NFR BLD AUTO: 70.7 %
NRBC # BLD AUTO: 0 10*3/UL
NRBC BLD AUTO-RTO: 0 /100
PLATELET # BLD AUTO: 152 10E9/L (ref 150–450)
RBC # BLD AUTO: 4.16 10E12/L (ref 3.8–5.2)
RUBV IGG SERPL IA-ACNC: 33 IU/ML
SPECIMEN EXP DATE BLD: NORMAL
T PALLIDUM AB SER QL: NONREACTIVE
VZV IGG SER QL IA: 1.1 AI (ref 0–0.8)
WBC # BLD AUTO: 5.1 10E9/L (ref 4–11)

## 2018-05-11 PROCEDURE — 85025 COMPLETE CBC W/AUTO DIFF WBC: CPT | Performed by: ADVANCED PRACTICE MIDWIFE

## 2018-05-11 PROCEDURE — 86900 BLOOD TYPING SEROLOGIC ABO: CPT | Performed by: ADVANCED PRACTICE MIDWIFE

## 2018-05-11 PROCEDURE — 87389 HIV-1 AG W/HIV-1&-2 AB AG IA: CPT | Performed by: ADVANCED PRACTICE MIDWIFE

## 2018-05-11 PROCEDURE — 86901 BLOOD TYPING SEROLOGIC RH(D): CPT | Performed by: ADVANCED PRACTICE MIDWIFE

## 2018-05-11 PROCEDURE — 87340 HEPATITIS B SURFACE AG IA: CPT | Performed by: ADVANCED PRACTICE MIDWIFE

## 2018-05-11 PROCEDURE — 86762 RUBELLA ANTIBODY: CPT | Performed by: ADVANCED PRACTICE MIDWIFE

## 2018-05-11 PROCEDURE — 86850 RBC ANTIBODY SCREEN: CPT | Performed by: ADVANCED PRACTICE MIDWIFE

## 2018-05-11 PROCEDURE — 76817 TRANSVAGINAL US OBSTETRIC: CPT | Mod: ZF

## 2018-05-11 PROCEDURE — 87086 URINE CULTURE/COLONY COUNT: CPT | Performed by: ADVANCED PRACTICE MIDWIFE

## 2018-05-11 PROCEDURE — 86706 HEP B SURFACE ANTIBODY: CPT | Performed by: ADVANCED PRACTICE MIDWIFE

## 2018-05-11 PROCEDURE — 86787 VARICELLA-ZOSTER ANTIBODY: CPT | Performed by: ADVANCED PRACTICE MIDWIFE

## 2018-05-11 PROCEDURE — 82306 VITAMIN D 25 HYDROXY: CPT | Performed by: ADVANCED PRACTICE MIDWIFE

## 2018-05-11 PROCEDURE — 36415 COLL VENOUS BLD VENIPUNCTURE: CPT | Performed by: ADVANCED PRACTICE MIDWIFE

## 2018-05-11 PROCEDURE — 86780 TREPONEMA PALLIDUM: CPT | Performed by: ADVANCED PRACTICE MIDWIFE

## 2018-05-11 RX ORDER — PRENATAL VIT/IRON FUM/FOLIC AC 27MG-0.8MG
1 TABLET ORAL DAILY
COMMUNITY
End: 2022-11-10

## 2018-05-11 NOTE — PROGRESS NOTES
32 year old female, , with LMP 03/10/2018, 8 6/7 weeks. Estimated Date of Delivery: 12/15/2018,  presents for confirmation of dates and assessment of viability. This study was done transvaginally.    Measurements     CRL = 25.4 mm = 9 2/7 weeks  EGA.        Fetal anatomy appears normal for gestational age.     Fetal/Fetal Cardiac Activity: Present.  FHR = 183bpm.     Implantation: Intrauterine.     Cervix = 4.2 cm      Maternal structures appear normal.    Impression: Intrauterine pregnancy at 8w6d by LMP consistent with crown-rump length measurement today of 9w2d, EDC 12/15/18. Otherwise normal-appearing pelvic anatomy.     Recommend comprehensive scan at 18 to 20 weeks.    VICENTE Parada MD  2018

## 2018-05-11 NOTE — MR AVS SNAPSHOT
After Visit Summary   5/11/2018    Sherita Glasgow    MRN: [id number]           Patient Information     Date Of Birth          1985        Visit Information        Provider Department      5/11/2018 9:00 AM Nilo Lucero APRN CNM [facility]        Today's Diagnoses     Supervision of normal first pregnancy, antepartum    -  1       Follow-ups after your visit        Follow-up notes from your care team     Return in about 2 weeks (around 5/25/2018) for New OB Visit.      Your next 10 appointments already scheduled     May 29, 2018  1:00 PM CDT   NEW OB with BINH Butcher CNM   [facility] ([facility])    [facility]  [address]  [address]  [facility] [id number] [phone]              Who to contact     Please call your clinic at [phone] to:    Ask questions about your health    Make or cancel appointments    Discuss your medicines    Learn about your test results    Speak to your doctor            Additional Information About Your Visit        MyChart Information     Teral[employer]k gives you secure access to your electronic health record. If you see a primary care provider, you can also send messages to your care team and make appointments. If you have questions, please call your primary care clinic.  If you do not have a primary care provider, please call [phone] and they will assist you.      Teralynk is an electronic gateway that provides easy, online access to your medical records. With Teralynk, you can request a clinic appointment, read your test results, renew a prescription or communicate with your care team.     To access your existing account, please contact your [facility] or call [phone] for assistance.        Care EveryWhere ID     This is your Care EveryWhere ID. This could be used by other organizations to access your [facility]  "records  [id number]        Your Vitals Were     Pulse Height Last Period BMI (Body Mass Index)          80 1.695 m (5' 6.73\") 03/10/2018 21.9 kg/m2         Blood Pressure from Last 3 Encounters:   05/11/18 127/76   02/23/18 118/78   01/29/18 112/77    Weight from Last 3 Encounters:   05/11/18 62.9 kg (138 lb 11.2 oz)   02/23/18 63.1 kg (139 lb 3.2 oz)   01/29/18 62.6 kg (138 lb)              We Performed the Following     25- OH-Vitamin D     ABO/Rh Type and Screen     CBC with Platelets Differential     Hepatitis B Surface Antibody     Hepatitis B Surface Antigen     HIV Antigen Antibody Combo     Rubella Antibody IgG Quantitative     Treponema Abs w Reflex to RPR and Titer     Urine Culture Aerobic Bacterial     Varicella Zoster Virus Antibody IgG        Primary Care Provider Office Phone # Fax #    Margot Razo PA-C [phone]       [address] [place] [address]        Equal Access to Services     DIANA HADDAD : Hadii aad ku hadasho Soomaali, waaxda luqadaha, qaybta kaalmada adeegyaana, ashley muro . So [facility] [phone].    ATENCIÓN: Si habla español, tiene a zhao disposición servicios gratuitos de asistencia lingüística. LlK[facility] [phone].    We comply with applicable federal civil rights laws and [place] laws. We do not discriminate on the basis of race, color, national origin, age, disability, sex, sexual orientation, or gender identity.            Thank you!     Thank you for choosing [facility]  for your care. Our goal is always to provide you with excellent care. Hearing back from our patients is one way we can continue to improve our services. Please take a few minutes to complete the written survey that you may receive in the mail after your visit with us. Thank you!             Your Updated Medication List - Protect others around you: Learn how to safely use, store and throw away your medicines at " www.disposemymeds.org.          This list is accurate as of 5/11/18 11:59 PM.  Always use your most recent med list.                   Brand Name Dispense Instructions for use Diagnosis    OMEGA 3 500 PO           prenatal multivitamin plus iron 27-0.8 MG Tabs per tablet      Take 1 tablet by mouth daily        rizatriptan 10 MG tablet    MAXALT    36 tablet    Take 1 tablet (10 mg) by mouth as needed for migraine    Migraine without aura and without status migrainosus, not intractable       VITAMIN D (CHOLECALCIFEROL) PO      Take 2,000 Units by mouth daily

## 2018-05-11 NOTE — LETTER
2018       RE: Sherita Galsgow  [address]  [address]  [place] [address]     Dear Colleague,    Thank you for referring your patient, Sherita Glasgow, to the [facility] at [facility]. Please see a copy of my visit note below.    [facility] OB Intake note  Subjective   32 year old woman presents to clinic for initiation of OB care.  Patient's last menstrual period was 03/10/2018.  at 8w6d by Estimated Date of Delivery: Dec 15, 2018 based on LMP.  Reviewed dating ultrasound, within normal lmits. Pregnancy is planned.  Accompanied to visit by John ESPARZA.    - Symptoms since LMP include fatigue.  Patient has tried these relief measures: none.  - Genetic/Infection questionnaire completed, risks include FOB family hx of MS.  - Current Medications  Current Outpatient Prescriptions   Medication Sig Dispense Refill     Omega-3 Fatty Acids (OMEGA 3 500 PO)        Prenatal Vit-Fe Fumarate-FA (PRENATAL MULTIVITAMIN PLUS IRON) 27-0.8 MG TABS per tablet Take 1 tablet by mouth daily          - Co-morbids  Past Medical History:   Diagnosis Date     Migraines     without aura     - Risk for GDM -  No risk  - The patient does not h/o Pre Eclampsia, Current multi fetal gestation, Pre Gestational Diabetes (Type 1 or Type 2), chronic hypertension, renal disease, Autoimmune disease (systematic lupus erythematosus, antiphospholipid syndrome) so WILL NOT start low dose aspirin (81mg) starting between 12 and 28 weeks to prevent preeclampsia.  - The patient  does not have a history of spontaneous  birth so  WILL NOT consider progesterone starting at 16-20 weeks and/or serial transvaginal cervical length ultrasounds from 16-24 weeks.     PERSONAL/SOCIAL HISTORY  Lives with her partner. John ESPARZA, involved.  x 4 years.  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  Her job involves light activity .  Additional items: None    Objective  -VS:  reviewed and within normal limits   -General appearance: no acute distress, patient is comfortable   NEUROLOGICAL/PSYCHIATRIC   - Orientated x 3   -Mood and affect: normal     Assessment/Plan:  32 year old  8w6d weeks of pregnancy with EMMANUEL of Dec 15, 2018 by LMP c/w ultrasound.  Outpatient Encounter Prescriptions as of 2018   Medication Sig Dispense Refill     Omega-3 Fatty Acids (OMEGA 3 500 PO)        Prenatal Vit-Fe Fumarate-FA (PRENATAL MULTIVITAMIN PLUS IRON) 27-0.8 MG TABS per tablet Take 1 tablet by mouth daily       rizatriptan (MAXALT) 10 MG tablet Take 1 tablet (10 mg) by mouth as needed for migraine (Patient not taking: Reported on 2018) 36 tablet 3     VITAMIN D, CHOLECALCIFEROL, PO Take 2,000 Units by mouth daily        Orders Placed This Encounter   Procedures     25- OH-Vitamin D     CBC with Platelets Differential     Hepatitis B Surface Antigen     Treponema Abs w Reflex to RPR and Titer     Rubella Antibody IgG Quantitative     HIV Antigen Antibody Combo     Varicella Zoster Virus Antibody IgG     Hepatitis B Surface Antibody     ABO/Rh Type and Screen     - Oriented to Practice, types of care, and how to reach a provider.  Discussed CNM vs MD care. Pt and FOB will consider.  - Patient received 1st trimester new OB education packet complete with aide of The Expectant Family booklet including information on genetic screening test options.    - Patient would like to discuss options further at new OB visit.  - Patient was encouraged to continue prenatal vitamins as tolerated.    - Patient was sent to lab for routine OB labs including varicella.     - Discussed medications that are safe to take during pregnancy for treatment of migraines. Consider Dr. Rayo appointment if migraines frequent/worsen.  - Pregnancy concerns to be addressed by provider at new OB exam include: needs GC/CT at next visit, pap up to date.  - Pt to RTO for NOB visit in 2-3 weeks and prn if questions or  concerns    Again, thank you for allowing me to participate in the care of your patient.      Sincerely,    BINH Reese CNM

## 2018-05-11 NOTE — MR AVS SNAPSHOT
After Visit Summary   5/11/2018    Sherita Glasgow    MRN: [id number]           Patient Information     Date Of Birth          1985        Visit Information        Provider Department      5/11/2018 8:30 AM [facility] ULTRASOUND [facility]        Today's Diagnoses     Normal pregnancy in first trimester           Follow-ups after your visit        Your next 10 appointments already scheduled     May 29, 2018  1:00 PM CDT   NEW OB with BINH Butcher CNM   [facility] ([facility])    [facility] [address]  [address]  [facility] [id number] [phone]              Who to contact     Please call your clinic at [phone] to:    Ask questions about your health    Make or cancel appointments    Discuss your medicines    Learn about your test results    Speak to your doctor            Additional Information About Your Visit        MyChart Information     [employer] gives you secure access to your electronic health record. If you see a primary care provider, you can also send messages to your care team and make appointments. If you have questions, please call your primary care clinic.  If you do not have a primary care provider, please call [phone] and they will assist you.      [email or web address] is an electronic gateway that provides easy, online access to your medical records. With [email or web address], you can request a clinic appointment, read your test results, renew a prescription or communicate with your care team.     To access your existing account, please contact your [facility] or call [phone] for assistance.        Care EveryWhere ID     This is your Care EveryWhere ID. This could be used by other organizations to access your [place] medical records  [id number]        Your Vitals Were     Last Period                   03/10/2018            Blood Pressure from Last 3  Encounters:   05/11/18 127/76   02/23/18 118/78   01/29/18 112/77    Weight from Last 3 Encounters:   05/11/18 62.9 kg (138 lb 11.2 oz)   02/23/18 63.1 kg (139 lb 3.2 oz)   01/29/18 62.6 kg (138 lb)              We Performed the Following     Dating ultrasound less than 14 weeks gestation Transvag  - [id number] (In Clinic)        Primary Care Provider Office Phone # Fax #    Margot Razo PA-C [phone] [address] [place] [address]        Equal Access to Services     [facility] : Hadii ngozi Babin, wadayne lopez, magy vásquezmaana dodge, ashley muro . So [facility] [phone].    ATENCIÓN: Si habla español, tiene a zhao disposición servicios gratuitos de asistencia lingüística. [facility] [phone].    We comply with applicable federal civil rights laws and [place] laws. We do not discriminate on the basis of race, color, national origin, age, disability, sex, sexual orientation, or gender identity.            Thank you!     Thank you for choosing [facility]  for your care. Our goal is always to provide you with excellent care. Hearing back from our patients is one way we can continue to improve our services. Please take a few minutes to complete the written survey that you may receive in the mail after your visit with us. Thank you!             Your Updated Medication List - Protect others around you: Learn how to safely use, store and throw away your medicines at www.disposemymeds.org.          This list is accurate as of 5/11/18 12:12 PM.  Always use your most recent med list.                   Brand Name Dispense Instructions for use Diagnosis    OMEGA 3 500 PO           prenatal multivitamin plus iron 27-0.8 MG Tabs per tablet      Take 1 tablet by mouth daily        rizatriptan 10 MG tablet    MAXALT    36 tablet    Take 1 tablet (10 mg) by mouth as needed for migraine    Migraine without aura and without  status migrainosus, not intractable       VITAMIN D (CHOLECALCIFEROL) PO      Take 2,000 Units by mouth daily

## 2018-05-11 NOTE — PROGRESS NOTES
[facility] OB Intake note  Subjective   32 year old woman presents to clinic for initiation of OB care.  Patient's last menstrual period was 03/10/2018.  at 8w6d by Estimated Date of Delivery: Dec 15, 2018 based on LMP.  Reviewed dating ultrasound, within normal lmits. Pregnancy is planned.  Accompanied to visit by John ESPARZA.    - Symptoms since LMP include fatigue.  Patient has tried these relief measures: none.  - Genetic/Infection questionnaire completed, risks include FOB family hx of MS.  - Current Medications  Current Outpatient Prescriptions   Medication Sig Dispense Refill     Omega-3 Fatty Acids (OMEGA 3 500 PO)        Prenatal Vit-Fe Fumarate-FA (PRENATAL MULTIVITAMIN PLUS IRON) 27-0.8 MG TABS per tablet Take 1 tablet by mouth daily          - Co-morbids  Past Medical History:   Diagnosis Date     Migraines     without aura     - Risk for GDM -  No risk  - The patient does not h/o Pre Eclampsia, Current multi fetal gestation, Pre Gestational Diabetes (Type 1 or Type 2), chronic hypertension, renal disease, Autoimmune disease (systematic lupus erythematosus, antiphospholipid syndrome) so WILL NOT start low dose aspirin (81mg) starting between 12 and 28 weeks to prevent preeclampsia.  - The patient  does not have a history of spontaneous  birth so  WILL NOT consider progesterone starting at 16-20 weeks and/or serial transvaginal cervical length ultrasounds from 16-24 weeks.     PERSONAL/SOCIAL HISTORY  Lives with her partner. John ESPARZA, involved.  x 4 years.  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  Her job involves light activity .  Additional items: None    Objective  -VS: reviewed and within normal limits   -General appearance: no acute distress, patient is comfortable   NEUROLOGICAL/PSYCHIATRIC   - Orientated x 3   -Mood and affect: normal     Assessment/Plan:  32 year old  8w6d weeks of pregnancy with EMMANUEL of Dec 15, 2018 by LMP c/w ultrasound.  Outpatient  Encounter Prescriptions as of 5/11/2018   Medication Sig Dispense Refill     Omega-3 Fatty Acids (OMEGA 3 500 PO)        Prenatal Vit-Fe Fumarate-FA (PRENATAL MULTIVITAMIN PLUS IRON) 27-0.8 MG TABS per tablet Take 1 tablet by mouth daily       rizatriptan (MAXALT) 10 MG tablet Take 1 tablet (10 mg) by mouth as needed for migraine (Patient not taking: Reported on 5/11/2018) 36 tablet 3     VITAMIN D, CHOLECALCIFEROL, PO Take 2,000 Units by mouth daily        Orders Placed This Encounter   Procedures     25- OH-Vitamin D     CBC with Platelets Differential     Hepatitis B Surface Antigen     Treponema Abs w Reflex to RPR and Titer     Rubella Antibody IgG Quantitative     HIV Antigen Antibody Combo     Varicella Zoster Virus Antibody IgG     Hepatitis B Surface Antibody     ABO/Rh Type and Screen     - Oriented to Practice, types of care, and how to reach a provider.  Discussed CNM vs MD care. Pt and FOB will consider.  - Patient received 1st trimester new OB education packet complete with aide of The Expectant Family booklet including information on genetic screening test options.    - Patient would like to discuss options further at new OB visit.  - Patient was encouraged to continue prenatal vitamins as tolerated.    - Patient was sent to lab for routine OB labs including varicella.     - Discussed medications that are safe to take during pregnancy for treatment of migraines. Consider Dr. Rayo appointment if migraines frequent/worsen.  - Pregnancy concerns to be addressed by provider at new OB exam include: needs GC/CT at next visit, pap up to date.  - Pt to RTO for NOB visit in 2-3 weeks and prn if questions or concerns

## 2018-05-11 NOTE — LETTER
2018       RE: Shertia Glasgow  [address]  [address]  [place] [address]     Dear Colleague,    Thank you for referring your patient, Sherita Glasgow, to the [facility] at [facility]. Please see a copy of my visit note below.    32 year old female, , with LMP 03/10/2018, 8 6/7 weeks. Estimated Date of Delivery: 12/15/2018,  presents for confirmation of dates and assessment of viability. This study was done transvaginally.    Measurements     CRL = 25.4 mm = 9 2/7 weeks  EGA.        Fetal anatomy appears normal for gestational age.     Fetal/Fetal Cardiac Activity: Present.  FHR = 183bpm.     Implantation: Intrauterine.     Cervix = 4.2 cm      Maternal structures appear normal.    Impression: Intrauterine pregnancy at 8w6d by LMP consistent with crown-rump length measurement today of 9w2d, EDC 12/15/18. Otherwise normal-appearing pelvic anatomy.     Recommend comprehensive scan at 18 to 20 weeks.    VICENTE Parada MD  2018

## 2018-05-12 LAB
BACTERIA SPEC CULT: NO GROWTH
Lab: NORMAL
SPECIMEN SOURCE: NORMAL

## 2018-05-29 ENCOUNTER — OFFICE VISIT (OUTPATIENT)
Dept: OBGYN | Facility: CLINIC | Age: 33
End: 2018-05-29
Attending: ADVANCED PRACTICE MIDWIFE
Payer: COMMERCIAL

## 2018-05-29 VITALS
DIASTOLIC BLOOD PRESSURE: 70 MMHG | WEIGHT: 137.3 LBS | SYSTOLIC BLOOD PRESSURE: 113 MMHG | HEART RATE: 64 BPM | BODY MASS INDEX: 22.07 KG/M2 | HEIGHT: 66 IN

## 2018-05-29 DIAGNOSIS — Z34.01 ENCOUNTER FOR SUPERVISION OF NORMAL FIRST PREGNANCY IN FIRST TRIMESTER: Primary | ICD-10-CM

## 2018-05-29 PROCEDURE — 87491 CHLMYD TRACH DNA AMP PROBE: CPT | Performed by: ADVANCED PRACTICE MIDWIFE

## 2018-05-29 PROCEDURE — G0463 HOSPITAL OUTPT CLINIC VISIT: HCPCS | Mod: ZF

## 2018-05-29 PROCEDURE — 87591 N.GONORRHOEAE DNA AMP PROB: CPT | Performed by: ADVANCED PRACTICE MIDWIFE

## 2018-05-29 ASSESSMENT — ENCOUNTER SYMPTOMS
BLOATING: 1
CONSTIPATION: 1

## 2018-05-29 ASSESSMENT — PATIENT HEALTH QUESTIONNAIRE - PHQ9: 5. POOR APPETITE OR OVEREATING: SEVERAL DAYS

## 2018-05-29 ASSESSMENT — ANXIETY QUESTIONNAIRES
7. FEELING AFRAID AS IF SOMETHING AWFUL MIGHT HAPPEN: NOT AT ALL
4. TROUBLE RELAXING: SEVERAL DAYS
5. BEING SO RESTLESS THAT IT IS HARD TO SIT STILL: NOT AT ALL
7. FEELING AFRAID AS IF SOMETHING AWFUL MIGHT HAPPEN: SEVERAL DAYS
1. FEELING NERVOUS, ANXIOUS, OR ON EDGE: SEVERAL DAYS
6. BECOMING EASILY ANNOYED OR IRRITABLE: SEVERAL DAYS
GAD7 TOTAL SCORE: 6
7. FEELING AFRAID AS IF SOMETHING AWFUL MIGHT HAPPEN: SEVERAL DAYS
3. WORRYING TOO MUCH ABOUT DIFFERENT THINGS: SEVERAL DAYS
1. FEELING NERVOUS, ANXIOUS, OR ON EDGE: SEVERAL DAYS
3. WORRYING TOO MUCH ABOUT DIFFERENT THINGS: SEVERAL DAYS
GAD7 TOTAL SCORE: 5
GAD7 TOTAL SCORE: 6
6. BECOMING EASILY ANNOYED OR IRRITABLE: SEVERAL DAYS
2. NOT BEING ABLE TO STOP OR CONTROL WORRYING: SEVERAL DAYS
5. BEING SO RESTLESS THAT IT IS HARD TO SIT STILL: NOT AT ALL
2. NOT BEING ABLE TO STOP OR CONTROL WORRYING: SEVERAL DAYS

## 2018-05-29 NOTE — MR AVS SNAPSHOT
"              After Visit Summary   5/29/2018    Sherita Glasgow    MRN: [id number]           Patient Information     Date Of Birth          1985        Visit Information        Provider Department      5/29/2018 1:00 PM Candelaria Vang APRN CNM [facility]        Today's Diagnoses     Encounter for supervision of normal first pregnancy in first trimester    -  1      Care Instructions      Thank you for choosing [facility] for your obstetrical care.  We are an integrated health clinic with obstetricians, midwives, a psychologist, an acupuncturist, a nutritionist, a pharmacist, internal medicine and family practice all in one.  If you have questions about services offered please ask.      o Please keep all appointments with your provider.  You will help catch, prevent and treat problems early.  o Review your Expectant Family booklet and folder given to you at this intake visit.  It can answer many basic questions including:    Treatment for nausea and vomiting    Medications that are safe in pregnancy    Healthy diet and weight gain    Exercise and activity  o ASK questions!!  Please use \"Questions for my New OB visit\" form to write down any questions or concerns for your next visit.  o Eat a healthy diet.  Visit www.choosemyplate.gov and click on  Pregnancy and Breastfeeding  for information and tips  o Do not smoke.  Avoid other people's smoke, too.  We are happy to help with referrals to stop smoking programs.  o Do not drink alcohol.  o Try to avoid people who have colds or other infections.  Practice good hand washing.  o   o Consider registering for prenatal education classes through "[employer] at [facility].  You can view class schedules and register online at [email or web address] or call [phone] for questions     For urgent concerns, call [facility] at [phone] to speak with a triage nurse during regular clinic hours (8:00 " am - 4:30 pm).  This line is answered by our service 24 hours a day, after hours a provider will return your call.          Follow-ups after your visit        Additional Services     MAT FETAL MED CTR REFERRAL-PREGNANCY       Body mass index is 22.16 kg/(m^2).    >> Patient may proceed with recommendations for further testing as directed by the Maternal Fetal Medicine Specialist >>    >> If requesting Fetal Echo: MFM will determine appropriate location for exam due to indication.    >> If requesting Lung Maturity Amnio:  If results indicate fetal lung maturity, induction or C/S is recommended within 36 hours.  Please schedule accordingly.     Dear Patient:   Please be aware that coverage of these services is subject to the terms and limitations of your health insurance plan.  Call member services at your health plan with any benefit or coverage questions.      Please bring the following to your appointment:    >>  Any x-rays, CTs or MRIs which have been performed.  Contact the facility where they were done to arrange for  prior to your scheduled appointment.  Any new CT, MRI or other procedures ordered by your specialist must be performed at a [facility] or coordinated by your clinic's referral office.  >>  List of current medications   >>  This referral request   >>  Any documents/labs given to you for this referral                  Follow-up notes from your care team     Return in about 5 weeks (around 7/3/2018) for next OB visit, CECELIA.      Your next 10 appointments already scheduled     Jun 06, 2018 10:15 AM CDT   Genetic Counseling with UR GEN COUNSELOR 2   [facility] [place] ([facility])    [address]  [facility] [address]   [phone]            Jun 06, 2018 11:00 AM CDT   MFM NUCHAL TRANSLUCENCY with [id number]   [facility] [place] ([facility])     "[address]  [facility] [address]   [phone]            Jun 06, 2018 11:30 AM CDT   Radiology MD with UR ESTEE SIMS   [facility] ([facility])    [address]  [facility] [address]   [phone]           Please arrive at the time given for your first appointment. This visit is used internally to schedule the physician's time during your ultrasound.              Future tests that were ordered for you today     Open Future Orders        Priority Expected Expires Ordered    M Genetic Counseling Routine  6/4/2019 6/4/2018    Maternal Fetal Nuchal Translucency Routine  4/4/2019 6/4/2018            Who to contact     Please call your clinic at [phone] to:    Ask questions about your health    Make or cancel appointments    Discuss your medicines    Learn about your test results    Speak to your doctor            Additional Information About Your Visit        [employer] Information     [employer] gives you secure access to your electronic health record. If you see a primary care provider, you can also send messages to your care team and make appointments. If you have questions, please call your primary care clinic.  If you do not have a primary care provider, please call [phone] and they will assist you.      [employer] is an electronic gateway that provides easy, online access to your medical records. With [employer], you can request a clinic appointment, read your test results, renew a prescription or communicate with your care team.     To access your existing account, please contact your [facility] or call [phone] for assistance.        Care EveryWhere ID     This is your Care EveryWhere ID. This could be used by other organizations to access your [place] medical records  [id number]        Your Vitals Were     Pulse Height Last Period BMI (Body Mass Index)          64 1.676 m (5' 6\") 03/10/2018 " 22.16 kg/m2         Blood Pressure from Last 3 Encounters:   05/29/18 113/70   05/11/18 127/76   02/23/18 118/78    Weight from Last 3 Encounters:   05/29/18 62.3 kg (137 lb 4.8 oz)   05/11/18 62.9 kg (138 lb 11.2 oz)   02/23/18 63.1 kg (139 lb 3.2 oz)              We Performed the Following     Chlamydia Trachomatis PCR [EDS932]     Gonorrhea PCR [[id number]]     MAT FETAL MED CTR REFERRAL-PREGNANCY        Primary Care Provider Office Phone # Fax #    Margot Razo PA-C [phone]       [address] [place] [address]        Equal Access to Services     DIANA HADDAD : Mello Babin, waaxda john, qaybta kaalmada echo, ashley muro . So [facility] [phone].    ATENCIÓN: Si habla español, tiene a zhao disposición servicios gratuitos de asistencia lingüística. [facility] [phone].    We comply with applicable federal civil rights laws and [place] laws. We do not discriminate on the basis of race, color, national origin, age, disability, sex, sexual orientation, or gender identity.            Thank you!     Thank you for choosing [facility]  for your care. Our goal is always to provide you with excellent care. Hearing back from our patients is one way we can continue to improve our services. Please take a few minutes to complete the written survey that you may receive in the mail after your visit with us. Thank you!             Your Updated Medication List - Protect others around you: Learn how to safely use, store and throw away your medicines at www.disposemymeds.org.          This list is accurate as of 5/29/18 11:59 PM.  Always use your most recent med list.                   Brand Name Dispense Instructions for use Diagnosis    OMEGA 3 500 PO           prenatal multivitamin plus iron 27-0.8 MG Tabs per tablet      Take 1 tablet by mouth daily        rizatriptan 10 MG tablet    MAXALT    36 tablet    Take 1 tablet  (10 mg) by mouth as needed for migraine    Migraine without aura and without status migrainosus, not intractable       VITAMIN D (CHOLECALCIFEROL) PO      Take 2,000 Units by mouth daily

## 2018-05-29 NOTE — LETTER
2018     RE: Sherita Glasgow  [address]  [address]  [place] [address]     Dear Colleague,    Thank you for referring your patient, Sherita Glasgow, to the [facility] at [facility]. Please see a copy of my visit note below.    SUBJECTIVE:   Sherita is a 32 year old female who presents to clinic for a new OB visit.   at 11w3d with Estimated Date of Delivery: Dec 15, 2018 based on LMP, US confirms. Feels well. Has started PNV.     She has not had bleeding since her LMP.   She has not had nausea. Weight loss has not occurred.   This was a planned pregnancy.   FOB and spouse John is present and excited about the pregnancy.     OTHER CONCERNS:   Feels well overall. Has questions regarding CNM vs MD care. Is unsure about genetic screening.   Has had a few minor headaches since pregnancy that she did not take any medication for.   Travels for work and is wondering about travel recommendations. Next trip is in 2 weeks. 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   Planning on signing up for the [employer] Women[employer]athlon.   ===========================================  ROS:   ROS: 10 point ROS neg other than the symptoms noted above in the HPI.      PSYCHIATRIC:  Denies mood changes, difficulty concentrating, depression, anxiety or panic attacks  PHQ-9 score:    PHQ-9 SCORE 2018   Total Score MyChart -   Total Score 4     VESNA-7 SCORE 2018   Total Score - - 6 (mild anxiety)   Total Score 16 6 5           Past History:  Her past medical history   Past Medical History:   Diagnosis Date     Migraines     without aura     This is her first pregnancy  Since her last LMP she denies use of alcohol, tobacco and street drugs.  HISTORY:  Family History   Problem Relation Age of Onset     Lung Cancer Paternal Grandmother      nonsmoker     Breast Cancer Paternal  Grandmother      over age 50     Alzheimer Disease Paternal Grandfather      Coronary Artery Disease Early Onset No family hx of      Hypertension No family hx of      Hyperlipidemia No family hx of      DIABETES No family hx of      Social History     Social History     Marital status:      Spouse name: N/A     Number of children: N/A     Years of education: N/A     Social History Main Topics     Smoking status: Never Smoker     Smokeless tobacco: Never Used     Alcohol use No      Comment: 3-5 drinks/week     Drug use: No     Sexual activity: Yes     Partners: Male     Birth control/ protection: Pull-out method     Other Topics Concern     Parent/Sibling W/ Cabg, Mi Or Angioplasty Before 65f 55m? No     Social History Narrative    Lives with . Just moved to [place] from [place] area where she finished grad school.  She now has a new job and works remotely for a company in [place].  Her  took a job here in [place] and he is from [place].        Has a good support system.    Feels safe in all environments.    Wears seatbelt 100% of the time    Wears helmet while biking.    Denies history of abuse, past or present, physical, sexual or emotional.    Kimberly Razo PA-C    01/29/18            .     Current Outpatient Prescriptions   Medication Sig     Omega-3 Fatty Acids (OMEGA 3 500 PO)      Prenatal Vit-Fe Fumarate-FA (PRENATAL MULTIVITAMIN PLUS IRON) 27-0.8 MG TABS per tablet Take 1 tablet by mouth daily     rizatriptan (MAXALT) 10 MG tablet Take 1 tablet (10 mg) by mouth as needed for migraine (Patient not taking: Reported on 5/11/2018)     VITAMIN D, CHOLECALCIFEROL, PO Take 2,000 Units by mouth daily     No current facility-administered medications for this visit.      No Known Allergies    ============================================  MEDICAL HISTORY  Past Medical History:   Diagnosis Date     Migraines     without aura     Past Surgical History:   Procedure Laterality Date     ANKLE SURGERY  2008      "COLONOSCOPY      normal result; done due to digestive issues     HERNIA REPAIR         Obstetric History       T0      L0     SAB0   TAB0   Ectopic0   Multiple0   Live Births0       # Outcome Date GA Lbr Allen/2nd Weight Sex Delivery Anes PTL Lv   1 Current                     GYN History- No history of Abnormal Pap Smears. Last pap smear in 2018- NIL.                         Cervical procedures: None                        History of STI: None    I personally reviewed the past social/family/medical and surgical history on the date of service.   I reviewed lab work done at Intake visit with patient.      Answers for HPI/ROS submitted by the patient on 2018   VESNA 7 TOTAL SCORE: 6  PHQ-2 Score: 2  General Symptoms: No  Skin Symptoms: Yes  HENT Symptoms: No  EYE SYMPTOMS: No  HEART SYMPTOMS: No  LUNG SYMPTOMS: No  INTESTINAL SYMPTOMS: Yes  URINARY SYMPTOMS: Yes  GYNECOLOGIC SYMPTOMS: No  BREAST SYMPTOMS: No  SKELETAL SYMPTOMS: No  BLOOD SYMPTOMS: No  NERVOUS SYSTEM SYMPTOMS: No  MENTAL HEALTH SYMPTOMS: Yes  Itching: Yes  Bloating: Yes  Constipation: Yes  Increased frequency of urination: Yes  Frequent nighttime urination: Yes      EXAM:  /70 (BP Location: Left arm, Patient Position: Chair)  Pulse 64  Ht 1.676 m (5' 6\")  Wt 62.3 kg (137 lb 4.8 oz)  LMP 03/10/2018  BMI 22.16 kg/m2   EXAM:  GENERAL:  Pleasant pregnant female, alert, cooperative and well groomed.  SKIN:  Warm and dry, without lesions or rashes  HEAD: Symmetrical features.  MOUTH:  Buccal mucosa pink, moist without lesions.  Teeth in good repair.    NECK:  Thyroid without enlargement and nodules.  Lymph nodes not palpable.  LUNGS:  Clear to auscultation.  BREAST:    No dominant, fixed or suspicious masses are noted.  No skin or nipple changes or axillary nodes.   Nipples everted.      HEART:  RRR without murmur.  ABDOMEN: Soft without masses , tenderness or organomegaly.  No CVA tenderness.  Uterus not palpable at size " "equal to dates.  No scars noted.. Fetal heart tones present.  MUSCULOSKELETAL:  Full range of motion  EXTREMITIES:  No edema. No significant varicosities.   PELVIC EXAM:  GENITALIA: Speculum exam deferred, bimanual exam done.  CERVIX:  No CMT.            UTERUS: Anteverted,  nontender 11 week size.   ADNEXA:  Without masses or tenderness.   RECTAL:  Normal appearance.  Digital exam deferred.  WET PREP:Not done  GC/CHLAMYDIA CULTURE OBTAINED:YES    Lab Results   Component Value Date    PAP NIL 2018          ASSESSMENT:  32 year old , 11w3d weeks of pregnancy with EMMANUEL of Dec 15, 2018 by LMP, US confirms  Intrauterine pregnancy 11w3d size consistent with dates  Genetic Screening: Unsure if they desire first trimester screening. Would like to discuss further with genetic counselor.     ICD-10-CM    1. Encounter for supervision of normal first pregnancy in first trimester Z34.01        PLAN:  - Reviewed use of triage nurse line and contacting the on-call provider after hours for an urgent need such as fever, vagina bleeding, bladder or vaginal infection, rupture of membranes,  or term labor.    - Reviewed best evidence for: weight gain for her weight and height for pregnancy:  Based on pre-pregnancy weight of 138 lbs and Body mass index is 22.16 kg/(m^2). RECOMMENDED WEIGHT GAIN: 25-35 lbs.  - Reviewed healthy diet and foods to avoid; exercise and activity during pregnancy; avoiding exposure to toxoplasmosis; and maintenance of a generally healthy lifestyle.   - Discussed the harms, benefits, side effects and alternative therapies for current prescribed and OTC medications.  - Offered option for consultation with Dr. Ojeda should her migraines worsen.   - Reviewed care provider preferences. She \"most likely\" desires [facility] care.  - Discussed options for genetic screening.She is still undecided at this point, but may find talking to the genetic counselor helpful. Orders entered for first trimester " "screening and level II ultrasound.     - All pt's and partner's questions discussed and answered.  Pt verbalized understanding of and agreement to plan of care.     - Continue scheduled prenatal care and prn if questions or concerns. Return to clinic in 5 weeks.     I, LYLY Coats, am serving as a scribe to document services personally performed by CNM based on the provider's statements to me.\" - LYLY Coats  I agree with the PFSH and ROS as completed by the student, except for changes made by me. The remainder of the encounter was performed by me and scribed by the student. The scribed note accurately reflects my personal services and decisions made by me.  Candelaria Vang, PONCHO, CNM, APRN    "

## 2018-05-29 NOTE — PROGRESS NOTES
SUBJECTIVE:   Sherita is a 32 year old female who presents to clinic for a new OB visit.   at 11w3d with Estimated Date of Delivery: Dec 15, 2018 based on LMP, US confirms. Feels well. Has started PNV.     She has not had bleeding since her LMP.   She has not had nausea. Weight loss has not occurred.   This was a planned pregnancy.   FOB and spouse John is present and excited about the pregnancy.     OTHER CONCERNS:   Feels well overall. Has questions regarding CNM vs MD care. Is unsure about genetic screening.   Has had a few minor headaches since pregnancy that she did not take any medication for.   Travels for work and is wondering about travel recommendations. Next trip is in 2 weeks. 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   Planning on signing up for the [employer] Women's Triathlon.   ===========================================  ROS:   ROS: 10 point ROS neg other than the symptoms noted above in the HPI.      PSYCHIATRIC:  Denies mood changes, difficulty concentrating, depression, anxiety or panic attacks  PHQ-9 score:    PHQ-9 SCORE 2018   Total Score MyChart -   Total Score 4     VESNA-7 SCORE 2018   Total Score - - 6 (mild anxiety)   Total Score 16 6 5           Past History:  Her past medical history   Past Medical History:   Diagnosis Date     Migraines     without aura     This is her first pregnancy  Since her last LMP she denies use of alcohol, tobacco and street drugs.  HISTORY:  Family History   Problem Relation Age of Onset     Lung Cancer Paternal Grandmother      nonsmoker     Breast Cancer Paternal Grandmother      over age 50     Alzheimer Disease Paternal Grandfather      Coronary Artery Disease Early Onset No family hx of      Hypertension No family hx of      Hyperlipidemia No family hx of      DIABETES No family hx of      Social History     Social History     Marital status:      Spouse  name: N/A     Number of children: N/A     Years of education: N/A     Social History Main Topics     Smoking status: Never Smoker     Smokeless tobacco: Never Used     Alcohol use No      Comment: 3-5 drinks/week     Drug use: No     Sexual activity: Yes     Partners: Male     Birth control/ protection: Pull-out method     Other Topics Concern     Parent/Sibling W/ Cabg, Mi Or Angioplasty Before 65f 55m? No     Social History Narrative    Lives with . Just moved to [place] from [place] area where she finished grad school.  She now has a new job and works remotely for a company in [place].  Her  took a job here in [place] and he is from [place].        Has a good support system.    Feels safe in all environments.    Wears seatbelt 100% of the time    Wears helmet while biking.    Denies history of abuse, past or present, physical, sexual or emotional.    Kimberly Razo PA-C    18            .     Current Outpatient Prescriptions   Medication Sig     Omega-3 Fatty Acids (OMEGA 3 500 PO)      Prenatal Vit-Fe Fumarate-FA (PRENATAL MULTIVITAMIN PLUS IRON) 27-0.8 MG TABS per tablet Take 1 tablet by mouth daily     rizatriptan (MAXALT) 10 MG tablet Take 1 tablet (10 mg) by mouth as needed for migraine (Patient not taking: Reported on 2018)     VITAMIN D, CHOLECALCIFEROL, PO Take 2,000 Units by mouth daily     No current facility-administered medications for this visit.      No Known Allergies    ============================================  MEDICAL HISTORY  Past Medical History:   Diagnosis Date     Migraines     without aura     Past Surgical History:   Procedure Laterality Date     ANKLE SURGERY       COLONOSCOPY      normal result; done due to digestive issues     HERNIA REPAIR         Obstetric History       T0      L0     SAB0   TAB0   Ectopic0   Multiple0   Live Births0       # Outcome Date GA Lbr Allen/2nd Weight Sex Delivery Anes PTL Lv   1 Current                     GYN  "History- No history of Abnormal Pap Smears. Last pap smear in 2018- NIL.                         Cervical procedures: None                        History of STI: None    I personally reviewed the past social/family/medical and surgical history on the date of service.   I reviewed lab work done at Intake visit with patient.      Answers for HPI/ROS submitted by the patient on 5/29/2018   VESNA 7 TOTAL SCORE: 6  PHQ-2 Score: 2  General Symptoms: No  Skin Symptoms: Yes  HENT Symptoms: No  EYE SYMPTOMS: No  HEART SYMPTOMS: No  LUNG SYMPTOMS: No  INTESTINAL SYMPTOMS: Yes  URINARY SYMPTOMS: Yes  GYNECOLOGIC SYMPTOMS: No  BREAST SYMPTOMS: No  SKELETAL SYMPTOMS: No  BLOOD SYMPTOMS: No  NERVOUS SYSTEM SYMPTOMS: No  MENTAL HEALTH SYMPTOMS: Yes  Itching: Yes  Bloating: Yes  Constipation: Yes  Increased frequency of urination: Yes  Frequent nighttime urination: Yes      EXAM:  /70 (BP Location: Left arm, Patient Position: Chair)  Pulse 64  Ht 1.676 m (5' 6\")  Wt 62.3 kg (137 lb 4.8 oz)  LMP 03/10/2018  BMI 22.16 kg/m2   EXAM:  GENERAL:  Pleasant pregnant female, alert, cooperative and well groomed.  SKIN:  Warm and dry, without lesions or rashes  HEAD: Symmetrical features.  MOUTH:  Buccal mucosa pink, moist without lesions.  Teeth in good repair.    NECK:  Thyroid without enlargement and nodules.  Lymph nodes not palpable.  LUNGS:  Clear to auscultation.  BREAST:    No dominant, fixed or suspicious masses are noted.  No skin or nipple changes or axillary nodes.   Nipples everted.      HEART:  RRR without murmur.  ABDOMEN: Soft without masses , tenderness or organomegaly.  No CVA tenderness.  Uterus not palpable at size equal to dates.  No scars noted.. Fetal heart tones present.  MUSCULOSKELETAL:  Full range of motion  EXTREMITIES:  No edema. No significant varicosities.   PELVIC EXAM:  GENITALIA: Speculum exam deferred, bimanual exam done.  CERVIX:  No CMT.            UTERUS: Anteverted,  nontender 11 week size. " "  ADNEXA:  Without masses or tenderness.   RECTAL:  Normal appearance.  Digital exam deferred.  WET PREP:Not done  GC/CHLAMYDIA CULTURE OBTAINED:YES    Lab Results   Component Value Date    PAP NIL 2018          ASSESSMENT:  32 year old , 11w3d weeks of pregnancy with EMMANUEL of Dec 15, 2018 by LMP, US confirms  Intrauterine pregnancy 11w3d size consistent with dates  Genetic Screening: Unsure if they desire first trimester screening. Would like to discuss further with genetic counselor.     ICD-10-CM    1. Encounter for supervision of normal first pregnancy in first trimester Z34.01        PLAN:  - Reviewed use of triage nurse line and contacting the on-call provider after hours for an urgent need such as fever, vagina bleeding, bladder or vaginal infection, rupture of membranes,  or term labor.    - Reviewed best evidence for: weight gain for her weight and height for pregnancy:  Based on pre-pregnancy weight of 138 lbs and Body mass index is 22.16 kg/(m^2). RECOMMENDED WEIGHT GAIN: 25-35 lbs.  - Reviewed healthy diet and foods to avoid; exercise and activity during pregnancy; avoiding exposure to toxoplasmosis; and maintenance of a generally healthy lifestyle.   - Discussed the harms, benefits, side effects and alternative therapies for current prescribed and OTC medications.  - Offered option for consultation with Dr. Ojeda should her migraines worsen.   - Reviewed care provider preferences. She \"most likely\" desires [facility] care.  - Discussed options for genetic screening.She is still undecided at this point, but may find talking to the genetic counselor helpful. Orders entered for first trimester screening and level II ultrasound.       - All pt's and partner's questions discussed and answered.  Pt verbalized understanding of and agreement to plan of care.     - Continue scheduled prenatal care and prn if questions or concerns. Return to clinic in 5 weeks.     Minoo PALOMINO, LYLY, am " "serving as a scribe to document services personally performed by CNM based on the provider's statements to me.\" - LYLY Coats  I agree with the PFSH and ROS as completed by the student, except for changes made by me. The remainder of the encounter was performed by me and scribed by the student. The scribed note accurately reflects my personal services and decisions made by me.  Candelaria Vang, DNP, CNM, APRN    "

## 2018-05-29 NOTE — PATIENT INSTRUCTIONS
"  Thank you for choosing [facility] for your obstetrical care.  We are an integrated health clinic with obstetricians, midwives, a psychologist, an acupuncturist, a nutritionist, a pharmacist, internal medicine and family practice all in one.  If you have questions about services offered please ask.      o Please keep all appointments with your provider.  You will help catch, prevent and treat problems early.  o Review your Expectant Family booklet and folder given to you at this intake visit.  It can answer many basic questions including:    Treatment for nausea and vomiting    Medications that are safe in pregnancy    Healthy diet and weight gain    Exercise and activity  o ASK questions!!  Please use \"Questions for my New OB visit\" form to write down any questions or concerns for your next visit.  o Eat a healthy diet.  Visit www.choosemyplate.gov and click on  Pregnancy and Breastfeeding  for information and tips  o Do not smoke.  Avoid other people's smoke, too.  We are happy to help with referrals to stop smoking programs.  o Do not drink alcohol.  o Try to avoid people who have colds or other infections.  Practice good hand washing.  o   o Consider registering for prenatal education classes through [employer] at [facility].  You can view class schedules and register online at [email or web address] or call [phone] for questions     For urgent concerns, call [facility] at [phone] to speak with a triage nurse during regular clinic hours (8:00 am - 4:30 pm).  This line is answered by our service 24 hours a day, after hours a provider will return your call.  "

## 2018-05-30 LAB
C TRACH DNA SPEC QL NAA+PROBE: NEGATIVE
N GONORRHOEA DNA SPEC QL NAA+PROBE: NEGATIVE
SPECIMEN SOURCE: NORMAL
SPECIMEN SOURCE: NORMAL

## 2018-05-30 ASSESSMENT — PATIENT HEALTH QUESTIONNAIRE - PHQ9: SUM OF ALL RESPONSES TO PHQ QUESTIONS 1-9: 4

## 2018-06-04 ENCOUNTER — MYC MEDICAL ADVICE (OUTPATIENT)
Dept: OBGYN | Facility: CLINIC | Age: 33
End: 2018-06-04

## 2018-06-04 ENCOUNTER — PRE VISIT (OUTPATIENT)
Dept: MATERNAL FETAL MEDICINE | Facility: CLINIC | Age: 33
End: 2018-06-04

## 2018-06-04 DIAGNOSIS — Z34.01 SUPERVISION OF NORMAL FIRST PREGNANCY IN FIRST TRIMESTER: Primary | ICD-10-CM

## 2018-06-06 ENCOUNTER — TELEPHONE (OUTPATIENT)
Dept: MATERNAL FETAL MEDICINE | Facility: CLINIC | Age: 33
End: 2018-06-06

## 2018-06-06 NOTE — TELEPHONE ENCOUNTER
ESTEE received a referral for Sherita for FTS and comprehensive ultrasound appointments. Sherita declines the FTS but is currently scheduled for her comp u/s.   FTS orders removed. Referring notified.     Isha SWAN  , ESTEE

## 2018-06-09 ENCOUNTER — TELEPHONE (OUTPATIENT)
Dept: OBGYN | Facility: CLINIC | Age: 33
End: 2018-06-09

## 2018-06-09 NOTE — TELEPHONE ENCOUNTER
Calls w c/o of early morning mid-back pain on waking. Slept well last night, did not get up to void during the night. Bladder very full. Denies fever, body aches, burning w urination, freq urination. This has happened one other time on waking with full bladder. Pt voided a large amount and pain has subsided.  Discussed full bladder/distended bladder s/s. Reviewed s/s UTI, pylo, kidney stones, appendicitis, when to call w worsening s/s. Pt will watch and call prn.   Candelaria Vang, BINH LOOMIS

## 2018-06-27 ENCOUNTER — TRANSFERRED RECORDS (OUTPATIENT)
Dept: HEALTH INFORMATION MANAGEMENT | Facility: CLINIC | Age: 33
End: 2018-06-27

## 2018-07-02 ENCOUNTER — OFFICE VISIT (OUTPATIENT)
Dept: OBGYN | Facility: CLINIC | Age: 33
End: 2018-07-02
Attending: ADVANCED PRACTICE MIDWIFE
Payer: COMMERCIAL

## 2018-07-02 VITALS
SYSTOLIC BLOOD PRESSURE: 103 MMHG | HEIGHT: 66 IN | WEIGHT: 141.3 LBS | DIASTOLIC BLOOD PRESSURE: 68 MMHG | BODY MASS INDEX: 22.71 KG/M2 | HEART RATE: 66 BPM

## 2018-07-02 DIAGNOSIS — Z34.02 ENCOUNTER FOR SUPERVISION OF NORMAL FIRST PREGNANCY IN SECOND TRIMESTER: Primary | ICD-10-CM

## 2018-07-02 DIAGNOSIS — G43.009 MIGRAINE WITHOUT AURA AND WITHOUT STATUS MIGRAINOSUS, NOT INTRACTABLE: ICD-10-CM

## 2018-07-02 PROCEDURE — G0463 HOSPITAL OUTPT CLINIC VISIT: HCPCS | Mod: ZF

## 2018-07-02 NOTE — MR AVS SNAPSHOT
After Visit Summary   7/2/2018    Sherita Glasgow    MRN: [id number]           Patient Information     Date Of Birth          1985        Visit Information        Provider Department      7/2/2018 9:45 AM Gordon Garcia CNM [facility]        Today's Diagnoses     Encounter for supervision of normal first pregnancy in second trimester    -  1    Migraine without aura and without status migrainosus, not intractable           Follow-ups after your visit        Follow-up notes from your care team     Return in about 4 weeks (around 7/30/2018) for SYLVIA LOOMIS.      Your next 10 appointments already scheduled     Jul 20, 2018  9:30 AM CDT   ESTEE US COMP with [id number]   [facility])    [address]  [facility] [address] [phone]           Wear comfortable clothes and leave your valuables at home.            Jul 20, 2018 10:00 AM CDT   Radiology MD with JAMES FONTANEZ MD   [facility])    [address]  [facility] [address] [phone]           Please arrive at the time given for your first appointment. This visit is used internally to schedule the physician's time during your ultrasound.            Aug 13, 2018 11:00 AM CDT   RETURN OB with BINH Branch CNM   [facility] ([facility])    [facility]  [address]  [address]  [facility] [id number] [phone]              Who to contact     Please call your clinic at [phone] to:    Ask questions about your health    Make or cancel appointments    Discuss your medicines    Learn about your test results    Speak to your doctor            Additional Information About Your Visit        MyChart Information     [employer] gives you secure access to your  "electronic health record. If you see a primary care provider, you can also send messages to your care team and make appointments. If you have questions, please call your primary care clinic.  If you do not have a primary care provider, please call [phone] and they will assist you.      Sweeten is an electronic gateway that provides easy, online access to your medical records. With [employer], you can request a clinic appointment, read your test results, renew a prescription or communicate with your care team.     To access your existing account, please contact your [facility] or call [phone] for assistance.        Care EveryWhere ID     This is your Care EveryWhere ID. This could be used by other organizations to access your [facility] medical records  [id number]        Your Vitals Were     Pulse Height Last Period Breastfeeding? BMI (Body Mass Index)       66 1.676 m (5' 6\") 03/10/2018 No 22.81 kg/m2        Blood Pressure from Last 3 Encounters:   07/02/18 103/68   05/29/18 113/70   05/11/18 127/76    Weight from Last 3 Encounters:   07/02/18 64.1 kg (141 lb 4.8 oz)   05/29/18 62.3 kg (137 lb 4.8 oz)   05/11/18 62.9 kg (138 lb 11.2 oz)              Today, you had the following     No orders found for display       Primary Care Provider Office Phone # Fax #    Margot Razo PA-C [phone]       [address] [place] [address]        Equal Access to Services     DIANA HADDAD AH: Hadii aad ku hadasho Soomaali, waaxda luqadaha, qaybta kaalmada adeegyada, ashley muro . So [facility] [phone].    ATENCIÓN: Si habla jason, tiene a zhao disposición servicios gratuitos de asistencia lingüística. Llame al [phone].    We comply with applicable federal civil rights laws and [place] laws. We do not discriminate on the basis of race, color, national origin, age, disability, sex, sexual orientation, or gender identity.          "   Thank you!     Thank you for choosing [facility]  for your care. Our goal is always to provide you with excellent care. Hearing back from our patients is one way we can continue to improve our services. Please take a few minutes to complete the written survey that you may receive in the mail after your visit with us. Thank you!             Your Updated Medication List - Protect others around you: Learn how to safely use, store and throw away your medicines at www.disposemymeds.org.          This list is accurate as of 7/2/18 11:59 PM.  Always use your most recent med list.                   Brand Name Dispense Instructions for use Diagnosis    OMEGA 3 500 PO           prenatal multivitamin plus iron 27-0.8 MG Tabs per tablet      Take 1 tablet by mouth daily        rizatriptan 10 MG tablet    MAXALT    36 tablet    Take 1 tablet (10 mg) by mouth as needed for migraine    Migraine without aura and without status migrainosus, not intractable       VITAMIN D (CHOLECALCIFEROL) PO      Take 2,000 Units by mouth daily

## 2018-07-02 NOTE — LETTER
"2018       RE: Sherita Glasgow  [address]  [address]  [place] [address]     Dear Colleague,    Thank you for referring your patient, Sherita Glasgow, to the [facility] Please see a copy of my visit note below.    Subjective:      32 year old  at 16w2d presents for a routine prenatal appointment.        Denies cramping/contractions, vaginal bleeding, discharge or leakage of fluid. Not feeling fetal movement yet.   No HA, vision changes, ruq/epigastric pain.      Patient concerns: Feeling well overall.   Main concern r/t hx of migraines. Had a recent headache that lasted for several days. Took tylenol with some relief. Inquiring about medication safety in pregnancy. Prior to pregnancy used maxalt.   Also wondering about exercise in pregnancy. Reviewed with pt.      Objective:  Vitals:    18 1027   BP: 103/68   BP Location: Left arm   Patient Position: Chair   Pulse: 66   Weight: 64.1 kg (141 lb 4.8 oz)   Height: 1.676 m (5' 6\")       See OB flowsheet    Assessment/Plan     Encounter Diagnosis   Name Primary?     Encounter for supervision of normal first pregnancy in second trimester Yes     - Reviewed total weight gain, encouraged continued healthy diet and exercise.      - Reviewed why/how to contact provider.      Patient education/orders or handouts today:  PTL signs/symptoms, Fetal movement and Level 2 u/s scheduled     Reviewed medication safety in pregnancy of migraine medications, including maxalt and fiorcet.  Offered consult with Dr. Rayo. Pt very interested- Epic message sent to Dr. Rayo to call patient.  Also recommended appointment with Dr. Godoy. Contact information given.  Level 2 ultrasound scheduled for .     BINH Joshi CNM                 "

## 2018-07-02 NOTE — NURSING NOTE
Chief Complaint   Patient presents with     Prenatal Care     16w2d       See ANNAMARIA Hernandes 7/2/2018

## 2018-07-02 NOTE — PROGRESS NOTES
"Subjective:      32 year old  at 16w2d presents for a routine prenatal appointment.        Denies cramping/contractions, vaginal bleeding, discharge or leakage of fluid. Not feeling fetal movement yet.   No HA, vision changes, ruq/epigastric pain.      Patient concerns: Feeling well overall.   Main concern r/t hx of migraines. Had a recent headache that lasted for several days. Took tylenol with some relief. Inquiring about medication safety in pregnancy. Prior to pregnancy used maxalt.   Also wondering about exercise in pregnancy. Reviewed with pt.      Objective:  Vitals:    18 1027   BP: 103/68   BP Location: Left arm   Patient Position: Chair   Pulse: 66   Weight: 64.1 kg (141 lb 4.8 oz)   Height: 1.676 m (5' 6\")       See OB flowsheet    Assessment/Plan     Encounter Diagnosis   Name Primary?     Encounter for supervision of normal first pregnancy in second trimester Yes     - Reviewed total weight gain, encouraged continued healthy diet and exercise.      - Reviewed why/how to contact provider.      Patient education/orders or handouts today:  PTL signs/symptoms, Fetal movement and Level 2 u/s scheduled     Reviewed medication safety in pregnancy of migraine medications, including maxalt and fiorcet.  Offered consult with Dr. Rayo. Pt very interested- Epic message sent to Dr. Rayo to call patient.  Also recommended appointment with Dr. Godoy. Contact information given.  Level 2 ultrasound scheduled for .     BINH Joshi CNM                "

## 2018-07-10 ENCOUNTER — TELEPHONE (OUTPATIENT)
Dept: PHARMACY | Facility: CLINIC | Age: 33
End: 2018-07-10

## 2018-07-10 NOTE — TELEPHONE ENCOUNTER
----- Message from Gordon Garcia CNM sent at 7/2/2018  5:00 PM CDT -----  Regarding: Would like a phone call to discuss migraine meds  Hi Brittani,    This patient would love a phone call from you to discuss migraine medications in pregnancy. She has been nervous to take any. She typically took maxalt outside of pregnancy.    Thank you,  BINH Joshi CNM

## 2018-07-23 ENCOUNTER — OFFICE VISIT (OUTPATIENT)
Dept: MATERNAL FETAL MEDICINE | Facility: CLINIC | Age: 33
End: 2018-07-23
Attending: ADVANCED PRACTICE MIDWIFE
Payer: COMMERCIAL

## 2018-07-23 ENCOUNTER — HOSPITAL ENCOUNTER (OUTPATIENT)
Dept: ULTRASOUND IMAGING | Facility: CLINIC | Age: 33
Discharge: HOME | End: 2018-07-23
Attending: ADVANCED PRACTICE MIDWIFE | Admitting: ADVANCED PRACTICE MIDWIFE
Payer: COMMERCIAL

## 2018-07-23 DIAGNOSIS — O26.90 PREGNANCY RELATED CONDITION, ANTEPARTUM: ICD-10-CM

## 2018-07-23 DIAGNOSIS — Z36.3 ENCOUNTER FOR ROUTINE SCREENING FOR MALFORMATION USING ULTRASONICS: Primary | ICD-10-CM

## 2018-07-23 PROCEDURE — 76805 OB US >/= 14 WKS SNGL FETUS: CPT

## 2018-07-23 NOTE — MR AVS SNAPSHOT
After Visit Summary   7/23/2018    Sherita Glasgow    MRN: [id number]           Patient Information     Date Of Birth          1985        Visit Information        Provider Department      7/23/2018 10:00 AM Sherita Jacome,  [facility] Maternal Fetal Medicine [facility]        Today's Diagnoses     Encounter for routine screening for malformation using ultrasonics    -  1       Follow-ups after your visit        Your next 10 appointments already scheduled     Aug 13, 2018 11:00 AM CDT   RETURN OB with BINH Brnach CNM   [facility] ([facility])    [facility]  [address]  [address]  [facility] [id number] [phone]              Future tests that were ordered for you today     Open Future Orders        Priority Expected Expires Ordered    Maternal Fetal OB Complete 2/3 Tri Sngle Routine  4/5/2019 6/5/2018            Who to contact     If you have questions or need follow up information about today's clinic visit or your schedule please contact [employer] [facility] directly at [phone].  Normal or non-critical lab and imaging results will be communicated to you by [employer], letter or phone within 4 business days after the clinic has received the results. If you do not hear from us within 7 days, please contact the clinic through [employer] or phone. If you have a critical or abnormal lab result, we will notify you by phone as soon as possible.  Submit refill requests through [employer] or call your pharmacy and they will forward the refill request to us. Please allow 3 business days for your refill to be completed.          Additional Information About Your Visit        [employer] Information     [employer] gives you secure access to your electronic health record. If you see a primary care provider, you can also send messages to your care team and make appointments. If you have questions, please call  your primary care clinic.  If you do not have a primary care provider, please call [phone] and they will assist you.        Care EveryWhere ID     This is your Care EveryWhere ID. This could be used by other organizations to access your [facility] medical records  [id number]        Your Vitals Were     Last Period                   03/10/2018            Blood Pressure from Last 3 Encounters:   07/02/18 103/68   05/29/18 113/70   05/11/18 127/76    Weight from Last 3 Encounters:   07/02/18 64.1 kg (141 lb 4.8 oz)   05/29/18 62.3 kg (137 lb 4.8 oz)   05/11/18 62.9 kg (138 lb 11.2 oz)              Today, you had the following     No orders found for display       Primary Care Provider Office Phone # Fax #    Margot Razo PA-C [phone]       [address] [place] [address]        Equal Access to Services     [facility] : Hadii aad ku hadasho Soomaali, waaxda luqadaha, qaybta kaalmada adeegyada, waxay prudencein carlee muro . So [facility] [phone].    ATENCIÓN: Si habla español, tiene a zhao disposición servicios gratuitos de asistencia lingüística. Llame al [phone].    We comply with applicable federal civil rights laws and [place] laws. We do not discriminate on the basis of race, color, national origin, age, disability, sex, sexual orientation, or gender identity.            Thank you!     Thank you for choosing [facility]  for your care. Our goal is always to provide you with excellent care. Hearing back from our patients is one way we can continue to improve our services. Please take a few minutes to complete the written survey that you may receive in the mail after your visit with us. Thank you!             Your Updated Medication List - Protect others around you: Learn how to safely use, store and throw away your medicines at www.disposemymeds.org.          This list is accurate as of 7/23/18 11:12 AM.  Always use your most  recent med list.                   Brand Name Dispense Instructions for use Diagnosis    OMEGA 3 500 PO           prenatal multivitamin plus iron 27-0.8 MG Tabs per tablet      Take 1 tablet by mouth daily        rizatriptan 10 MG tablet    MAXALT    36 tablet    Take 1 tablet (10 mg) by mouth as needed for migraine    Migraine without aura and without status migrainosus, not intractable       VITAMIN D (CHOLECALCIFEROL) PO      Take 2,000 Units by mouth daily

## 2018-07-23 NOTE — PROGRESS NOTES
"Please see \"Imaging\" tab under \"Chart Review\" for details of today's US.    Sherita Jacome, DO    "

## 2018-08-13 ENCOUNTER — OFFICE VISIT (OUTPATIENT)
Dept: OBGYN | Facility: CLINIC | Age: 33
End: 2018-08-13
Attending: ADVANCED PRACTICE MIDWIFE
Payer: COMMERCIAL

## 2018-08-13 VITALS
SYSTOLIC BLOOD PRESSURE: 113 MMHG | DIASTOLIC BLOOD PRESSURE: 74 MMHG | HEART RATE: 67 BPM | WEIGHT: 148.5 LBS | BODY MASS INDEX: 23.87 KG/M2 | HEIGHT: 66 IN

## 2018-08-13 DIAGNOSIS — Z34.02 ENCOUNTER FOR SUPERVISION OF NORMAL FIRST PREGNANCY IN SECOND TRIMESTER: Primary | ICD-10-CM

## 2018-08-13 NOTE — MR AVS SNAPSHOT
"              After Visit Summary   8/13/2018    Sherita Glasgow    MRN: [id number]           Patient Information     Date Of Birth          1985        Visit Information        Provider Department      8/13/2018 11:00 AM Haley Moore APRN CNM [facility]        Today's Diagnoses     Encounter for supervision of normal first pregnancy in second trimester    -  1       Follow-ups after your visit        Follow-up notes from your care team     Return in about 4 weeks (around 9/10/2018) for EOB.      Who to contact     Please call your clinic at [phone] to:    Ask questions about your health    Make or cancel appointments    Discuss your medicines    Learn about your test results    Speak to your doctor            Additional Information About Your Visit        [employer] Information     [email or web address] gives you secure access to your electronic health record. If you see a primary care provider, you can also send messages to your care team and make appointments. If you have questions, please call your primary care clinic.  If you do not have a primary care provider, please call [phone] and they will assist you.      [email or web address] is an electronic gateway that provides easy, online access to your medical records. With [email or web address], you can request a clinic appointment, read your test results, renew a prescription or communicate with your care team.     To access your existing account, please contact your [facility] or call [phone] for assistance.        Care EveryWhere ID     This is your Care EveryWhere ID. This could be used by other organizations to access your [facility] medical records  [id number]        Your Vitals Were     Pulse Height Last Period BMI (Body Mass Index)          67 1.676 m (5' 6\") 03/10/2018 23.97 kg/m2         Blood Pressure from Last 3 Encounters:   08/13/18 113/74   07/02/18 103/68   05/29/18 113/70    Weight from Last 3 Encounters: "   08/13/18 67.4 kg (148 lb 8 oz)   07/02/18 64.1 kg (141 lb 4.8 oz)   05/29/18 62.3 kg (137 lb 4.8 oz)              Today, you had the following     No orders found for display       Primary Care Provider Office Phone # Fax #    Margot Razo PA-C [phone]       [address] [place] [address]        Equal Access to Services     DIANA HADDAD : Hadii aad ku hadasho Soomaali, waaxda luqadaha, qaybta kaalmada adeegyada, waxay idiin hayaan adeeg yomairaarafabi lamarisela . So [facility] [phone].    ATENCIÓN: Si habla español, tiene a zhao disposición servicios gratuitos de asistencia lingüística. [facility] [phone].    We comply with applicable federal civil rights laws and [place] laws. We do not discriminate on the basis of race, color, national origin, age, disability, sex, sexual orientation, or gender identity.            Thank you!     Thank you for choosing [facility]  for your care. Our goal is always to provide you with excellent care. Hearing back from our patients is one way we can continue to improve our services. Please take a few minutes to complete the written survey that you may receive in the mail after your visit with us. Thank you!             Your Updated Medication List - Protect others around you: Learn how to safely use, store and throw away your medicines at www.disposemymeds.org.          This list is accurate as of 8/13/18 12:21 PM.  Always use your most recent med list.                   Brand Name Dispense Instructions for use Diagnosis    OMEGA 3 500 PO           prenatal multivitamin plus iron 27-0.8 MG Tabs per tablet      Take 1 tablet by mouth daily        rizatriptan 10 MG tablet    MAXALT    36 tablet    Take 1 tablet (10 mg) by mouth as needed for migraine    Migraine without aura and without status migrainosus, not intractable       VITAMIN D (CHOLECALCIFEROL) PO      Take 2,000 Units by mouth daily

## 2018-08-13 NOTE — LETTER
"2018       RE: Sherita Glasgow  [address]  [address]  [place] [address]     Dear Colleague,    Thank you for referring your patient, Sherita Glasgow, to the [facility] at [facility]. Please see a copy of my visit note below.    Subjective:      32 year old  at 22w2d presents for a routine prenatal appointment.       Denies vaginal bleeding or leakage of fluid.  Denies contractions or cramping.    Positive fetal movement.       No HA, visual changes, RUQ or epigastric pain.   The patient presents with the following concerns: Wondering about birth tour, upcoming appointments, and recommendations for birthing classes that they should take. Recently got back from a trip to [place] [facility] [place] to see family. John (FOB) asks if their baby boy can hear him if he reads him stories. Concern over back sleeping. She emailed with the pharmacist and found that she could take her migraine medication during pregnancy, but she has not needed it. Otherwise feeling well.    Level II US  Results reviewed normal scan.        Objective:  Vitals:    18 1101   BP: 113/74   Pulse: 67   Weight: 67.4 kg (148 lb 8 oz)   Height: 1.676 m (5' 6\")     See OB flowsheet    Assessment/Plan     Encounter Diagnosis   Name Primary?     Encounter for supervision of normal first pregnancy in second trimester Yes       - Reviewed total weight gain, encouraged continued healthy diet and exercise.      - Reviewed why/how to contact provider.   - Provided hand out for birth tour  - Reviewed birthing classes. Recommended a general one since they are unsure about any birthing preferences. Also recommended breast feeding class if she is interested.   - Discussed back sleeping; reviewed concerning signs and symptoms such as being lightheaded or dizziness.    Patient education/orders or handouts today:  PTL signs/symptoms, Fetal movement and Plan for EOB visit w labs      Return to " clinic in 4 weeks and prn if questions or concerns.     I, Dina Stack, am serving as a scribe; to document services personally performed by  BINH Saunders CNM based on data collection and the provider's statements to me.     Dina Stack, PA-S2      I agree with the note completed by the CNASHLEY Student, except for changes made by me. The encounter was performed by me and scribed by the student. The scribed note accurately reflects my personal services and decisions made by me.      Again, thank you for allowing me to participate in the care of your patient.      Sincerely,    BINH Camarena CNM

## 2018-08-13 NOTE — PROGRESS NOTES
"Subjective:      32 year old  at 22w2d presents for a routine prenatal appointment.       Denies vaginal bleeding or leakage of fluid.  Denies contractions or cramping.    Positive fetal movement.       No HA, visual changes, RUQ or epigastric pain.   The patient presents with the following concerns: Wondering about birth tour, upcoming appointments, and recommendations for birthing classes that they should take. Recently got back from a trip to [place] [employer] [place] to see family. John (FOB) asks if their baby boy can hear him if he reads him stories. Concern over back sleeping. She emailed with the pharmacist and found that she could take her migraine medication during pregnancy, but she has not needed it. Otherwise feeling well.    Level II US  Results reviewed normal scan.        Objective:  Vitals:    18 1101   BP: 113/74   Pulse: 67   Weight: 67.4 kg (148 lb 8 oz)   Height: 1.676 m (5' 6\")     See OB flowsheet    Assessment/Plan     Encounter Diagnosis   Name Primary?     Encounter for supervision of normal first pregnancy in second trimester Yes       - Reviewed total weight gain, encouraged continued healthy diet and exercise.      - Reviewed why/how to contact provider.   - Provided hand out for birth tour  - Reviewed birthing classes. Recommended a general one since they are unsure about any birthing preferences. Also recommended breast feeding class if she is interested.   - Discussed back sleeping; reviewed concerning signs and symptoms such as being lightheaded or dizziness.    Patient education/orders or handouts today:  PTL signs/symptoms, Fetal movement and Plan for EOB visit w labs      Return to clinic in 4 weeks and prn if questions or concerns.     I, Dina Stack, am serving as a scribe; to document services personally performed by  BINH Saunders CNM based on data collection and the provider's statements to me.     Dina Stack, PA-S2      I agree with the note completed by the " CNM Student, except for changes made by me. The encounter was performed by me and scribed by the student. The scribed note accurately reflects my personal services and decisions made by me.    BINH Camarena CNM

## 2018-09-24 ENCOUNTER — OFFICE VISIT (OUTPATIENT)
Dept: OBGYN | Facility: CLINIC | Age: 33
End: 2018-09-24
Attending: ADVANCED PRACTICE MIDWIFE
Payer: COMMERCIAL

## 2018-09-24 VITALS
WEIGHT: 155.1 LBS | HEIGHT: 66 IN | HEART RATE: 71 BPM | BODY MASS INDEX: 24.93 KG/M2 | DIASTOLIC BLOOD PRESSURE: 69 MMHG | SYSTOLIC BLOOD PRESSURE: 103 MMHG

## 2018-09-24 DIAGNOSIS — Z34.03 ENCOUNTER FOR SUPERVISION OF NORMAL FIRST PREGNANCY IN THIRD TRIMESTER: Primary | ICD-10-CM

## 2018-09-24 DIAGNOSIS — G43.009 MIGRAINE WITHOUT AURA AND WITHOUT STATUS MIGRAINOSUS, NOT INTRACTABLE: ICD-10-CM

## 2018-09-24 DIAGNOSIS — Z34.02 ENCOUNTER FOR SUPERVISION OF NORMAL FIRST PREGNANCY IN SECOND TRIMESTER: ICD-10-CM

## 2018-09-24 LAB
BASOPHILS # BLD AUTO: 0 10E9/L (ref 0–0.2)
BASOPHILS NFR BLD AUTO: 0.2 %
DIFFERENTIAL METHOD BLD: ABNORMAL
EOSINOPHIL # BLD AUTO: 0 10E9/L (ref 0–0.7)
EOSINOPHIL NFR BLD AUTO: 0.5 %
ERYTHROCYTE [DISTWIDTH] IN BLOOD BY AUTOMATED COUNT: 12.4 % (ref 10–15)
GLUCOSE 1H P 50 G GLC PO SERPL-MCNC: 87 MG/DL (ref 60–129)
HCT VFR BLD AUTO: 34.3 % (ref 35–47)
HGB BLD-MCNC: 11.8 G/DL (ref 11.7–15.7)
IMM GRANULOCYTES # BLD: 0 10E9/L (ref 0–0.4)
IMM GRANULOCYTES NFR BLD: 0.3 %
LYMPHOCYTES # BLD AUTO: 0.8 10E9/L (ref 0.8–5.3)
LYMPHOCYTES NFR BLD AUTO: 13.7 %
MCH RBC QN AUTO: 31.6 PG (ref 26.5–33)
MCHC RBC AUTO-ENTMCNC: 34.4 G/DL (ref 31.5–36.5)
MCV RBC AUTO: 92 FL (ref 78–100)
MONOCYTES # BLD AUTO: 0.4 10E9/L (ref 0–1.3)
MONOCYTES NFR BLD AUTO: 7.1 %
NEUTROPHILS # BLD AUTO: 4.7 10E9/L (ref 1.6–8.3)
NEUTROPHILS NFR BLD AUTO: 78.2 %
NRBC # BLD AUTO: 0 10*3/UL
NRBC BLD AUTO-RTO: 0 /100
PLATELET # BLD AUTO: 120 10E9/L (ref 150–450)
RBC # BLD AUTO: 3.74 10E12/L (ref 3.8–5.2)
WBC # BLD AUTO: 6 10E9/L (ref 4–11)

## 2018-09-24 PROCEDURE — G0463 HOSPITAL OUTPT CLINIC VISIT: HCPCS | Mod: ZF

## 2018-09-24 PROCEDURE — 85025 COMPLETE CBC W/AUTO DIFF WBC: CPT | Performed by: ADVANCED PRACTICE MIDWIFE

## 2018-09-24 PROCEDURE — 82950 GLUCOSE TEST: CPT | Performed by: ADVANCED PRACTICE MIDWIFE

## 2018-09-24 PROCEDURE — 86803 HEPATITIS C AB TEST: CPT | Performed by: ADVANCED PRACTICE MIDWIFE

## 2018-09-24 PROCEDURE — 36415 COLL VENOUS BLD VENIPUNCTURE: CPT | Performed by: ADVANCED PRACTICE MIDWIFE

## 2018-09-24 PROCEDURE — 86780 TREPONEMA PALLIDUM: CPT | Performed by: ADVANCED PRACTICE MIDWIFE

## 2018-09-24 PROCEDURE — 82306 VITAMIN D 25 HYDROXY: CPT | Performed by: ADVANCED PRACTICE MIDWIFE

## 2018-09-24 ASSESSMENT — ANXIETY QUESTIONNAIRES
3. WORRYING TOO MUCH ABOUT DIFFERENT THINGS: MORE THAN HALF THE DAYS
1. FEELING NERVOUS, ANXIOUS, OR ON EDGE: MORE THAN HALF THE DAYS
5. BEING SO RESTLESS THAT IT IS HARD TO SIT STILL: NOT AT ALL
7. FEELING AFRAID AS IF SOMETHING AWFUL MIGHT HAPPEN: NOT AT ALL
2. NOT BEING ABLE TO STOP OR CONTROL WORRYING: MORE THAN HALF THE DAYS
GAD7 TOTAL SCORE: 10
6. BECOMING EASILY ANNOYED OR IRRITABLE: MORE THAN HALF THE DAYS

## 2018-09-24 ASSESSMENT — PAIN SCALES - GENERAL: PAINLEVEL: NO PAIN (0)

## 2018-09-24 ASSESSMENT — PATIENT HEALTH QUESTIONNAIRE - PHQ9: 5. POOR APPETITE OR OVEREATING: MORE THAN HALF THE DAYS

## 2018-09-24 NOTE — NURSING NOTE
Chief Complaint   Patient presents with     Prenatal Care     28 wks 2days      Patient has no questions or concerns. Brenna Snell CMA on 9/24/2018 at 9:34 AM

## 2018-09-24 NOTE — LETTER
2018     RE: Sherita Glasgow  [address]  [address]  [place] [address]     Dear Colleague,    Thank you for referring your patient, Sherita Glasgow, to the [facility] at [facility]. Please see a copy of my visit note below.     33 year old, , 28w2d,   The patient presents with the following concerns: Overall is doing well. Has has some indigestion. Eating slowly and not getting too hungry is helping.  Has not tried Tums yet.     Denies LOF or vaginal bleeding. No contractions.  Has questions about lying on her back.     Had a headache that lasted for 4 days and has Rx for migraine meds.  Felt it  was a stress HA, had a lot going on that week.  Took her migraine meds with some relief. Did check BP, was low- in her normal range.    PHQ-9 SCORE 2018   Total Score MyChart - - -   Total Score 8 4 8     Education completed today includes breast feeding, [facility] hand out , contraception, counting movements, signs of pre-term labor, when to present to birthplace, post partum depression, GBS, getting enough iron and labor induction.  Birth preferences reviewed: Un-Medicated  Labor support:  -partner, maybe ,   [place] Feeding plans :    Contraception planned:  unsure  The following labs were ordered today:     GCT, CBC w platelets, Vitamin d, Hep C, Anti-treponema  Water birth consent form was not given. We ran out of time- needs to to run to lab for glucose test. Please review at next visit.   Blood type:   ABO   Date Value Ref Range Status   2018 A  Final     RH(D)   Date Value Ref Range Status   2018 Pos  Final     Antibody Screen   Date Value Ref Range Status   2018 Neg  Final   , Rhogam  was not given.  TDAP  was not given.  A/P:  Encounter Diagnoses   Name Primary?     Encounter for supervision of normal first pregnancy in second trimester      Migraine without aura and without status  migrainosus, not intractable      Encounter for supervision of normal first pregnancy in third trimester Yes     Orders Placed This Encounter   Procedures     Treponema Abs w Reflex to RPR and Titer     Glucose 1 Hour     CBC with Platelets Differential     25- OH-Vitamin D     Hepatitis C antibody     Anxiety- Pt is not on meds. Thinks about starting a daily med. Pt  Has reservations. Offered to meet with Dr. Rayo.  Pt will think about it. Declines to day.     NEEDs Tdap at next visit.     Knows to monitor BP with any headaches.  Has BP cuff at home.   Needs to discuss waterbith at next appt. .     Continue scheduled prenatal care  BINH Bell CNM              Answers for HPI/ROS submitted by the patient on 9/20/2018   PHQ-2 Score: 2    Again, thank you for allowing me to participate in the care of your patient.      Sincerely,    BINH Bell CNM

## 2018-09-24 NOTE — PROGRESS NOTES
33 year old, , 28w2d,   The patient presents with the following concerns: Overall is doing well. Has has some indigestion. Eating slowly and not getting too hungry is helping.  Has not tried Tums yet.     Denies LOF or vaginal bleeding. No contractions.  Has questions about lying on her back.     Had a headache that lasted for 4 days and has Rx for migraine meds.  Felt it  was a stress HA, had a lot going on that week.  Took her migraine meds with some relief. Did check BP, was low- in her normal range.    PHQ-9 SCORE 2018   Total Score MyChart - - -   Total Score 8 4 8     Education completed today includes breast feeding, [facility] hand out , contraception, counting movements, signs of pre-term labor, when to present to birthplace, post partum depression, GBS, getting enough iron and labor induction.  Birth preferences reviewed: Un-Medicated  Labor support:  -partner, maybe ,    Feeding plans :    Contraception planned:  unsure  The following labs were ordered today:     GCT, CBC w platelets, Vitamin d, Hep C, Anti-treponema  Water birth consent form was not given. We ran out of time- needs to to run to lab for glucose test. Please review at next visit.   Blood type:   ABO   Date Value Ref Range Status   2018 A  Final     RH(D)   Date Value Ref Range Status   2018 Pos  Final     Antibody Screen   Date Value Ref Range Status   2018 Neg  Final   , Rhogam  was not given.  TDAP  was not given.  A/P:  Encounter Diagnoses   Name Primary?     Encounter for supervision of normal first pregnancy in second trimester      Migraine without aura and without status migrainosus, not intractable      Encounter for supervision of normal first pregnancy in third trimester Yes     Orders Placed This Encounter   Procedures     Treponema Abs w Reflex to RPR and Titer     Glucose 1 Hour     CBC with Platelets Differential     25- OH-Vitamin D     Hepatitis C antibody      Anxiety- Pt is not on meds. Thinks about starting a daily med. Pt  Has reservations. Offered to meet with Dr. Rayo.  Pt will think about it. Declines to day.     NEEDs Tdap at next visit.     Knows to monitor BP with any headaches.  Has BP cuff at home.   Needs to discuss waterbith at next appt. .     Continue scheduled prenatal care  BINH Bell CNM              Answers for HPI/ROS submitted by the patient on 9/20/2018   PHQ-2 Score: 2

## 2018-09-25 LAB
DEPRECATED CALCIDIOL+CALCIFEROL SERPL-MC: 48 UG/L (ref 20–75)
HCV AB SERPL QL IA: NONREACTIVE

## 2018-09-25 ASSESSMENT — PATIENT HEALTH QUESTIONNAIRE - PHQ9: SUM OF ALL RESPONSES TO PHQ QUESTIONS 1-9: 8

## 2018-09-25 ASSESSMENT — ANXIETY QUESTIONNAIRES: GAD7 TOTAL SCORE: 10

## 2018-09-26 LAB — T PALLIDUM AB SER QL: NONREACTIVE

## 2018-09-30 ENCOUNTER — TELEPHONE (OUTPATIENT)
Dept: OBGYN | Facility: CLINIC | Age: 33
End: 2018-09-30

## 2018-09-30 NOTE — TELEPHONE ENCOUNTER
Sherita paged at 1100 to report that when wiping this morning after sleeping all night, she noticed that with wiping her discharge was dark yellow in color with areas that appeared dark brown. Otherwise completely asymptomatic. Feeling normal fetal movement, no cramping or contractions, no recent IC, no other vaginal symptoms such as change in odor, pain with intercourse, pruritis. No hemorrhoids, experiencing mild constipation. Her discharge since has been clear. This occurred on 1 other occasion in the past few weeks.     Discussed normal physiologic changes that can affect discharge in pregnancy. Warning signs and reasons to follow up reviewed including contractions, vaginal bleeding, decreased fetal movement or new symptoms of vaginal infection. Follow up in clinic at next routine prenatal visit, sooner prn. Pt agrees with plan.     BINH Camarena CNM

## 2018-10-19 ENCOUNTER — OFFICE VISIT (OUTPATIENT)
Dept: OBGYN | Facility: CLINIC | Age: 33
End: 2018-10-19
Attending: ADVANCED PRACTICE MIDWIFE
Payer: COMMERCIAL

## 2018-10-19 VITALS
SYSTOLIC BLOOD PRESSURE: 108 MMHG | HEIGHT: 66 IN | HEART RATE: 87 BPM | BODY MASS INDEX: 25.1 KG/M2 | DIASTOLIC BLOOD PRESSURE: 70 MMHG | WEIGHT: 156.2 LBS

## 2018-10-19 DIAGNOSIS — Z23 NEED FOR INFLUENZA VACCINATION: ICD-10-CM

## 2018-10-19 DIAGNOSIS — F43.23 ADJUSTMENT DISORDER WITH MIXED ANXIETY AND DEPRESSED MOOD: ICD-10-CM

## 2018-10-19 DIAGNOSIS — D69.6 THROMBOCYTOPENIA (H): ICD-10-CM

## 2018-10-19 DIAGNOSIS — G43.009 MIGRAINE WITHOUT AURA AND WITHOUT STATUS MIGRAINOSUS, NOT INTRACTABLE: ICD-10-CM

## 2018-10-19 DIAGNOSIS — Z34.03 ENCOUNTER FOR SUPERVISION OF NORMAL FIRST PREGNANCY IN THIRD TRIMESTER: Primary | ICD-10-CM

## 2018-10-19 DIAGNOSIS — Z23 NEED FOR VACCINATION: ICD-10-CM

## 2018-10-19 DIAGNOSIS — Z00.6 RESEARCH STUDY PATIENT: ICD-10-CM

## 2018-10-19 PROCEDURE — 90715 TDAP VACCINE 7 YRS/> IM: CPT | Mod: ZF

## 2018-10-19 PROCEDURE — 90472 IMMUNIZATION ADMIN EACH ADD: CPT

## 2018-10-19 PROCEDURE — 90686 IIV4 VACC NO PRSV 0.5 ML IM: CPT | Mod: ZF

## 2018-10-19 PROCEDURE — G0008 ADMIN INFLUENZA VIRUS VAC: HCPCS

## 2018-10-19 PROCEDURE — 25000128 H RX IP 250 OP 636: Mod: ZF

## 2018-10-19 ASSESSMENT — PAIN SCALES - GENERAL: PAINLEVEL: MILD PAIN (2)

## 2018-10-19 NOTE — MR AVS SNAPSHOT
After Visit Summary   10/19/2018    Sherita Glasgow    MRN: [id number]           Patient Information     Date Of Birth          1985        Visit Information        Provider Department      10/19/2018 10:15 AM Cindy Ferguson APRN CNM [facility]        Today's Diagnoses     Encounter for supervision of normal first pregnancy in third trimester    -  1    Migraine without aura and without status migrainosus, not intractable        Adjustment disorder with mixed anxiety and depressed mood        Pt has mouthguard on L and D, please give it to pt for labor        Thrombocytopenia (H)        Need for influenza vaccination        Need for vaccination           Follow-ups after your visit        Follow-up notes from your care team     Discussed this visit Return in about 2 weeks (around 11/2/2018) for CECELIA.      Who to contact     Please call your clinic at [phone] to:    Ask questions about your health    Make or cancel appointments    Discuss your medicines    Learn about your test results    Speak to your doctor            Additional Information About Your Visit        [employer]ively Information     [email or web address] gives you secure access to your electronic health record. If you see a primary care provider, you can also send messages to your care team and make appointments. If you have questions, please call your primary care clinic.  If you do not have a primary care provider, please call [phone] and they will assist you.      [email or web address] is an electronic gateway that provides easy, online access to your medical records. With [email or web address], you can request a clinic appointment, read your test results, renew a prescription or communicate with your care team.     To access your existing account, please contact your [facility] or call [phone] for assistance.        Care EveryWhere ID     This is your Care EveryWhere ID. This could be used by other  "organizations to access your [facility] medical records  [id number]        Your Vitals Were     Pulse Height Last Period BMI (Body Mass Index)          87 1.676 m (5' 6\") 03/10/2018 25.21 kg/m2         Blood Pressure from Last 3 Encounters:   10/19/18 108/70   09/24/18 103/69   08/13/18 113/74    Weight from Last 3 Encounters:   10/19/18 70.9 kg (156 lb 3.2 oz)   09/24/18 70.4 kg (155 lb 1.6 oz)   08/13/18 67.4 kg (148 lb 8 oz)              We Performed the Following     HC FLU VAC PRESRV FREE QUAD SPLIT VIR 3+YRS IM     TDAP VACCINE (BOOSTRIX)        Primary Care Provider Office Phone # Fax #    Margot Razo PA-C [phone]       [address] [place] [address]        Equal Access to Services     [facility] : Hadii aad ku hadasho Sofernie, waaxda luqadaha, qaybta kaalmada adeegyada, ashley muro . So [facility] [phone].    ATENCIÓN: Si habla español, tiene a zhao disposición servicios gratuitos de asistencia lingüística. Kashif al [phone].    We comply with applicable federal civil rights laws and [place] laws. We do not discriminate on the basis of race, color, national origin, age, disability, sex, sexual orientation, or gender identity.            Thank you!     Thank you for choosing [facility]  for your care. Our goal is always to provide you with excellent care. Hearing back from our patients is one way we can continue to improve our services. Please take a few minutes to complete the written survey that you may receive in the mail after your visit with us. Thank you!             Your Updated Medication List - Protect others around you: Learn how to safely use, store and throw away your medicines at www.disposemymeds.org.          This list is accurate as of 10/19/18 11:59 PM.  Always use your most recent med list.                   Brand Name Dispense Instructions for use Diagnosis    OMEGA 3 500 PO           prenatal " multivitamin plus iron 27-0.8 MG Tabs per tablet      Take 1 tablet by mouth daily        rizatriptan 10 MG tablet    MAXALT    36 tablet    Take 1 tablet (10 mg) by mouth as needed for migraine    Migraine without aura and without status migrainosus, not intractable       VITAMIN D (CHOLECALCIFEROL) PO      Take 2,000 Units by mouth daily

## 2018-10-19 NOTE — LETTER
"10/19/2018     RE: Sherita Glasgow  [address]  [address]  [place] [address]     Dear Colleague,    Thank you for referring your patient, Sherita Glasgow, to the [facility] at [facility]. Please see a copy of my visit note below.    Subjective:      33 year old  at 31w6d presentst for a routine prenatal appointment.  No vaginal bleeding or leakage of fluid.  no contractions. + fetal movement.     No HA, visual changes, RUQ or epigastric pain.     Patient concerns:  Feeling well overall.  Reviewed EOB labs with patient.  Reviewed Gestational Thrombocytopenia, pt agreeable to CBC.    Reviewed TDAP and Flu shot Consents today     Objective:  Vitals:    10/19/18 1020   BP: 108/70   BP Location: Left arm   Patient Position: Sitting   Cuff Size: Adult Regular   Pulse: 87   Weight: 70.9 kg (156 lb 3.2 oz)   Height: 1.676 m (5' 6\")   See OB flowsheet  Assessment/Plan     Encounter Diagnoses   Name Primary?     Encounter for supervision of normal first pregnancy in third trimester Yes     Migraine without aura and without status migrainosus, not intractable      Adjustment disorder with mixed anxiety and depressed mood      Pt has mouthguard on L and D, please give it to pt for labor      Thrombocytopenia (H)      Need for influenza vaccination      Need for vaccination      Orders Placed This Encounter   Procedures     HC FLU VAC PRESRV FREE QUAD SPLIT VIR 3+YRS IM     TDAP VACCINE (BOOSTRIX)     CBC with Platelets       ABO   Date Value Ref Range Status   2018 A  Final     RH(D)   Date Value Ref Range Status   2018 Pos  Final     Antibody Screen   Date Value Ref Range Status   2018 Neg  Final   , Rhogam  was notgiven.    - Reviewed total weight gain, encouraged continued healthy diet and exercise.  .  Reviewed importance of daily fetal kick count and why/how to contact provider.    - Reviewed why/how to contact provider if " headache/visual changes/RUQ or epigastric pain, decreased fetal movement, vaginal bleeding, leakage of fluid or more than 4 contractions in an hour.  - Continue monthly CBC until 36 weeks then weekly until delivery. Hematology prn if <100.   Patient education/orders or handouts today:PTL signs/symptoms, TDAP and Flu shot  Reviewed GBS screening at 35-36 wks.    Return to clinic in 2 weeks and prn if questions or concerns.     BINH BocanegraM

## 2018-10-19 NOTE — NURSING NOTE
"Chief Complaint   Patient presents with     Prenatal Care     31 wks 6 days      Patient states running/ jogging yesterday and is now sore. Not sure if its pelvic pain or pressure. Brenna Snell CMA on 10/19/2018 at 10:19 AM      Injectable Influenza Immunization Documentation    1.  Has the patient received the information for the injectable influenza vaccine? YES     2. Is the patient 6 months of age or older? YES     3. Does the patient have any of the following contraindications?         Severe allergy to eggs?  No     Severe allergic reaction to previous influenza vaccines?  No   Severe allergy to latex?  No       History of Guillain-[place] syndrome?  No     Currently have a temperature greater than 100.4F?  No        4.  Severely egg allergic patients should have flu vaccine eligibility assessed by an MD, RN, or pharmacist, and those who received flu vaccine should be observed for 15 min by an MD, RN, Pharmacist, Medical Technician, or member of clinic staff.\": NO    5. Latex-allergic patients should be given latex-free influenza vaccine No. Please reference the Vaccine latex table to determine if your clinic s product is latex-containing.       Vaccination given by Brenna Snell CMA on 10/19/2018 at 10:51 AM        .  "

## 2018-10-19 NOTE — PROGRESS NOTES
"Subjective:      33 year old  at 31w6d presentst for a routine prenatal appointment.  No vaginal bleeding or leakage of fluid.  no contractions. + fetal movement.     No HA, visual changes, RUQ or epigastric pain.     Patient concerns:  Feeling well overall.  Reviewed EOB labs with patient.  Reviewed Gestational Thrombocytopenia, pt agreeable to CBC.    Reviewed TDAP and Flu shot Consents today     Objective:  Vitals:    10/19/18 1020   BP: 108/70   BP Location: Left arm   Patient Position: Sitting   Cuff Size: Adult Regular   Pulse: 87   Weight: 70.9 kg (156 lb 3.2 oz)   Height: 1.676 m (5' 6\")   See OB flowsheet  Assessment/Plan     Encounter Diagnoses   Name Primary?     Encounter for supervision of normal first pregnancy in third trimester Yes     Migraine without aura and without status migrainosus, not intractable      Adjustment disorder with mixed anxiety and depressed mood      Pt has mouthguard on L and D, please give it to pt for labor      Thrombocytopenia (H)      Need for influenza vaccination      Need for vaccination      Orders Placed This Encounter   Procedures     HC FLU VAC PRESRV FREE QUAD SPLIT VIR 3+YRS IM     TDAP VACCINE (BOOSTRIX)     CBC with Platelets       ABO   Date Value Ref Range Status   2018 A  Final     RH(D)   Date Value Ref Range Status   2018 Pos  Final     Antibody Screen   Date Value Ref Range Status   2018 Neg  Final   , Rhogam  was notgiven.    - Reviewed total weight gain, encouraged continued healthy diet and exercise.  .  Reviewed importance of daily fetal kick count and why/how to contact provider.    - Reviewed why/how to contact provider if headache/visual changes/RUQ or epigastric pain, decreased fetal movement, vaginal bleeding, leakage of fluid or more than 4 contractions in an hour.  - Continue monthly CBC until 36 weeks then weekly until delivery. Hematology prn if <100.   Patient education/orders or handouts today:PTL signs/symptoms, TDAP " and Flu shot  Reviewed GBS screening at 35-36 wks.    Return to clinic in 2 weeks and prn if questions or concerns.     BINH BocanegraM

## 2018-11-15 ENCOUNTER — OFFICE VISIT (OUTPATIENT)
Dept: OBGYN | Facility: CLINIC | Age: 33
End: 2018-11-15
Attending: ADVANCED PRACTICE MIDWIFE
Payer: COMMERCIAL

## 2018-11-15 VITALS
BODY MASS INDEX: 25.39 KG/M2 | WEIGHT: 158 LBS | HEART RATE: 80 BPM | HEIGHT: 66 IN | SYSTOLIC BLOOD PRESSURE: 113 MMHG | DIASTOLIC BLOOD PRESSURE: 77 MMHG

## 2018-11-15 DIAGNOSIS — D69.6 THROMBOCYTOPENIA (H): ICD-10-CM

## 2018-11-15 DIAGNOSIS — Z34.03 ENCOUNTER FOR SUPERVISION OF NORMAL FIRST PREGNANCY IN THIRD TRIMESTER: Primary | ICD-10-CM

## 2018-11-15 LAB
ERYTHROCYTE [DISTWIDTH] IN BLOOD BY AUTOMATED COUNT: 12.7 % (ref 10–15)
HCT VFR BLD AUTO: 37.1 % (ref 35–47)
HGB BLD-MCNC: 12.7 G/DL (ref 11.7–15.7)
MCH RBC QN AUTO: 31.1 PG (ref 26.5–33)
MCHC RBC AUTO-ENTMCNC: 34.2 G/DL (ref 31.5–36.5)
MCV RBC AUTO: 91 FL (ref 78–100)
PLATELET # BLD AUTO: 119 10E9/L (ref 150–450)
RBC # BLD AUTO: 4.08 10E12/L (ref 3.8–5.2)
WBC # BLD AUTO: 8.4 10E9/L (ref 4–11)

## 2018-11-15 PROCEDURE — G0463 HOSPITAL OUTPT CLINIC VISIT: HCPCS | Mod: ZF

## 2018-11-15 PROCEDURE — 87653 STREP B DNA AMP PROBE: CPT | Performed by: ADVANCED PRACTICE MIDWIFE

## 2018-11-15 PROCEDURE — 85027 COMPLETE CBC AUTOMATED: CPT | Performed by: ADVANCED PRACTICE MIDWIFE

## 2018-11-15 PROCEDURE — 36415 COLL VENOUS BLD VENIPUNCTURE: CPT | Performed by: ADVANCED PRACTICE MIDWIFE

## 2018-11-15 ASSESSMENT — PAIN SCALES - GENERAL: PAINLEVEL: NO PAIN (0)

## 2018-11-15 NOTE — MR AVS SNAPSHOT
"              After Visit Summary   11/15/2018    Sherita Glasgow    MRN: [id number]           Patient Information     Date Of Birth          1985        Visit Information        Provider Department      11/15/2018 1:15 PM Haley Moore APRN CNM [facility]        Today's Diagnoses     Encounter for supervision of normal first pregnancy in third trimester    -  1    Thrombocytopenia (H)           Follow-ups after your visit        Follow-up notes from your care team     Return in about 1 week (around 11/22/2018) for Return OB.      Who to contact     Please call your clinic at [phone] to:    Ask questions about your health    Make or cancel appointments    Discuss your medicines    Learn about your test results    Speak to your doctor            Additional Information About Your Visit        [employer] Information     [employer] gives you secure access to your electronic health record. If you see a primary care provider, you can also send messages to your care team and make appointments. If you have questions, please call your primary care clinic.  If you do not have a primary care provider, please call [phone] and they will assist you.      [employer] is an electronic gateway that provides easy, online access to your medical records. With [employer], you can request a clinic appointment, read your test results, renew a prescription or communicate with your care team.     To access your existing account, please contact your [facility] or call [phone] for assistance.        Care EveryWhere ID     This is your Care EveryWhere ID. This could be used by other organizations to access your [facility] medical records  [id number]        Your Vitals Were     Pulse Height Last Period BMI (Body Mass Index)          80 1.676 m (5' 6\") 03/10/2018 25.5 kg/m2         Blood Pressure from Last 3 Encounters:   11/15/18 113/77   10/19/18 108/70   09/24/18 103/69    " Weight from Last 3 Encounters:   11/15/18 71.7 kg (158 lb)   10/19/18 70.9 kg (156 lb 3.2 oz)   09/24/18 70.4 kg (155 lb 1.6 oz)              We Performed the Following     CBC with Platelets     Group B strep PCR        Primary Care Provider Office Phone # Fax #    Margot Razo PA-C [phone]        [address] [place] [address]        Equal Access to Services     DIANA HADDAD : Hadii aad ku hadasho Soomaali, waaxda luqadaha, qaybta kaalmada adeegyada, waxay idiin hayaan adeeg cate muro . So [facility] [phone].    ATENCIÓN: Si habla español, tiene a zhao disposición servicios gratuitos de asistencia lingüística. [facility] [phone].    We comply with applicable federal civil rights laws and [place] laws. We do not discriminate on the basis of race, color, national origin, age, disability, sex, sexual orientation, or gender identity.            Thank you!     Thank you for choosing [facility]  for your care. Our goal is always to provide you with excellent care. Hearing back from our patients is one way we can continue to improve our services. Please take a few minutes to complete the written survey that you may receive in the mail after your visit with us. Thank you!             Your Updated Medication List - Protect others around you: Learn how to safely use, store and throw away your medicines at www.disposemymeds.org.          This list is accurate as of 11/15/18  2:06 PM.  Always use your most recent med list.                   Brand Name Dispense Instructions for use Diagnosis    OMEGA 3 500 PO           prenatal multivitamin plus iron 27-0.8 MG Tabs per tablet      Take 1 tablet by mouth daily        rizatriptan 10 MG tablet    MAXALT    36 tablet    Take 1 tablet (10 mg) by mouth as needed for migraine    Migraine without aura and without status migrainosus, not intractable       VITAMIN D (CHOLECALCIFEROL) PO      Take 2,000 Units by mouth daily

## 2018-11-15 NOTE — PROGRESS NOTES
"Subjective:      33 year old  at 35w5d presents for a routine prenatal appointment.         No vaginal bleeding,  leakage of fluid, or change in vaginal discharge.  No contractions.  Normal daily fetal movement.     No HA, visual changes, RUQ or epigastric pain.   Patient concerns: Feeling well overall. Having occasional vaginal/pelvis pains c/w ligament pain. Also having left sided rib pain that is ongoing. Discussed belly support belt and chiropractic care.     Objective:  Vitals:    11/15/18 1318   BP: 113/77   Pulse: 80   Weight: 71.7 kg (158 lb)   Height: 1.676 m (5' 6\")    See OB flowsheet    Assessment/Plan     Encounter Diagnoses   Name Primary?     Encounter for supervision of normal first pregnancy in third trimester Yes     Thrombocytopenia (H)      Orders Placed This Encounter   Procedures     CBC with Platelets       PHQ-9 SCORE 2018   Total Score MyChart - - -   Total Score 8 4 8       GBS screening: Obtained.  Labor signs discussed. Reinforced daily fetal movement counts.  Reviewed why/how to contact provider if headache/visual changes/RUQ or epigastric pain, decreased fetal movement, vaginal bleeding, leakage of fluid.  Return to clinic in 1 week and prn if questions or concerns.     BINH Camarena CNM    Answers for HPI/ROS submitted by the patient on 2018   PHQ-2 Score: 2    "

## 2018-11-15 NOTE — LETTER
"11/15/2018       RE: Sherita Glasgow  [address]  [address]  [place] [address]     Dear Colleague,    Thank you for referring your patient, Sherita Glasgow, to the [facility] at [facility]. Please see a copy of my visit note below.    Subjective:      33 year old  at 35w5d presents for a routine prenatal appointment.         No vaginal bleeding,  leakage of fluid, or change in vaginal discharge.  No contractions.  Normal daily fetal movement.     No HA, visual changes, RUQ or epigastric pain.   Patient concerns: Feeling well overall. Having occasional vaginal/pelvis pains c/w ligament pain. Also having left sided rib pain that is ongoing. Discussed belly support belt and chiropractic care.     Objective:  Vitals:    11/15/18 1318   BP: 113/77   Pulse: 80   Weight: 71.7 kg (158 lb)   Height: 1.676 m (5' 6\")    See OB flowsheet    Assessment/Plan     Encounter Diagnoses   Name Primary?     Encounter for supervision of normal first pregnancy in third trimester Yes     Thrombocytopenia (H)      Orders Placed This Encounter   Procedures     CBC with Platelets       PHQ-9 SCORE 2018   Total Score MyChart - - -   Total Score 8 4 8       GBS screening: Obtained.  Labor signs discussed. Reinforced daily fetal movement counts.  Reviewed why/how to contact provider if headache/visual changes/RUQ or epigastric pain, decreased fetal movement, vaginal bleeding, leakage of fluid.  Return to clinic in 1 week and prn if questions or concerns.     BINH CamarenaM    "

## 2018-11-16 LAB
GP B STREP DNA SPEC QL NAA+PROBE: NEGATIVE
SPECIMEN SOURCE: NORMAL

## 2018-11-22 ENCOUNTER — TELEPHONE (OUTPATIENT)
Dept: OBGYN | Facility: CLINIC | Age: 33
End: 2018-11-22

## 2018-11-23 NOTE — TELEPHONE ENCOUNTER
36w5d  Pt calling tonight with intermittent rt sided back pain that started yesterday. Got a little better today but now is hurting again. No crmaping, contr, LOF. Baby active. Does not remember lifting anything wrong. Pt does stationary bike and yoga for exercise. No pain or weakness in either leg. Pt instructed to try heat and tylenol and rest. If not improved by morning or worsening, to come to BP for evaluation. Labor warning sx reviewed. Pt agreeable to plan. BINH Sparks CNM

## 2018-11-26 ENCOUNTER — OFFICE VISIT (OUTPATIENT)
Dept: OBGYN | Facility: CLINIC | Age: 33
End: 2018-11-26
Attending: ADVANCED PRACTICE MIDWIFE
Payer: COMMERCIAL

## 2018-11-26 VITALS
HEART RATE: 64 BPM | DIASTOLIC BLOOD PRESSURE: 67 MMHG | HEIGHT: 66 IN | SYSTOLIC BLOOD PRESSURE: 104 MMHG | BODY MASS INDEX: 25.88 KG/M2 | WEIGHT: 161 LBS

## 2018-11-26 DIAGNOSIS — Z34.03 ENCOUNTER FOR SUPERVISION OF NORMAL FIRST PREGNANCY IN THIRD TRIMESTER: Primary | ICD-10-CM

## 2018-11-26 PROCEDURE — G0463 HOSPITAL OUTPT CLINIC VISIT: HCPCS | Mod: ZF

## 2018-11-26 ASSESSMENT — PAIN SCALES - GENERAL: PAINLEVEL: NO PAIN (0)

## 2018-11-26 NOTE — LETTER
"2018       RE: Sherita Glasgow  [address]  [address]  [place] [address]     Dear Colleague,    Thank you for referring your patient, Sherita Glasgow, to the [facility] at [facility]. Please see a copy of my visit note below.    Subjective:     33 year old  at 37w2d presents for a routine prenatal appointment.         Denies vaginal bleeding,  leakage of fluid, or change in vaginal discharge.  Denies contractions.  Endorses fetal movement.     No HA, visual changes, RUQ or epigastric pain.   Patient concerns: Is feeling well overall. Wanting more info on how to do exercise safely in pregnancy after back strain last week.     Objective:  Vitals:    18 0934   BP: 104/67   Pulse: 64   Weight: 73 kg (161 lb)   Height: 1.676 m (5' 6\")   See OB flowsheet    Assessment/Plan     Encounter Diagnosis   Name Primary?     Encounter for supervision of normal first pregnancy in third trimester Yes       PHQ-9 SCORE 2018   Total Score MyChart - - -   Total Score 8 4 8     PHQ-9 SCORE 2018   Total Score MyChart - - -   Total Score 8 4 8     GBS screening: negative  Birth preferences reviewed: Un-Medicated, Water birth and Feeding preference breast  Labor signs discussed, reviewed early labor management.   Discussed when/why we would do cervical exams prior to active labor.   Reinforced daily fetal movement counts.  Discussed caring for back while exercising in third trimester, made recommendations for exercise and adding belly band in third tri if needed. Provided info on cat/cow and Spinning Babies.   Reviewed why/how to contact provider if headache/visual changes/RUQ or epigastric pain, decreased fetal movement, vaginal bleeding, leakage of fluid.  Do repeat CBC with platelets at next appt, stable gestational thrombocytopenia at 119/120 since Sept.  Return to clinic in 1 week and prn if " questions or concerns.       Jeri Rodrigues CNM

## 2018-11-26 NOTE — MR AVS SNAPSHOT
After Visit Summary   11/26/2018    Sherita Glasgow    MRN: [id number]           Patient Information     Date Of Birth          1985        Visit Information        Provider Department      11/26/2018 9:30 AM Jeri Rodrigues [facility]        Today's Diagnoses     Encounter for supervision of normal first pregnancy in third trimester    -  1       Follow-ups after your visit        Follow-up notes from your care team     Return for CECELIA Visit.      Your next 10 appointments already scheduled     Dec 03, 2018  9:30 AM CST   RETURN OB with BINH Cummins [facility]   [facility] ([facility])    [facility] [address]  [address]  [facility] [address] [phone]            Dec 10, 2018  9:30 AM CST   RETURN OB with BINH Bell [facility] ([facility])    [facility] [address]  [address]  [facility] [address] [phone]              Who to contact     Please call your clinic at [phone] to:    Ask questions about your health    Make or cancel appointments    Discuss your medicines    Learn about your test results    Speak to your doctor            Additional Information About Your Visit        MyChart Information     [employer] gives you secure access to your electronic health record. If you see a primary care provider, you can also send messages to your care team and make appointments. If you have questions, please call your primary care clinic.  If you do not have a primary care provider, please call [phone] and they will assist you.      [employer] is an electronic gateway that provides easy, online access to your medical records. With [employer], you can request a clinic appointment, read your test results, renew a prescription or communicate with your care team.     To access your existing account, please  "contact your [facility] or call [phone] for assistance.        Care EveryWhere ID     This is your Care EveryWhere ID. This could be used by other organizations to access your [facility] medical records  [id number]        Your Vitals Were     Pulse Height Last Period BMI (Body Mass Index)          64 1.676 m (5' 6\") 03/10/2018 25.99 kg/m2         Blood Pressure from Last 3 Encounters:   11/26/18 104/67   11/15/18 113/77   10/19/18 108/70    Weight from Last 3 Encounters:   11/26/18 73 kg (161 lb)   11/15/18 71.7 kg (158 lb)   10/19/18 70.9 kg (156 lb 3.2 oz)              Today, you had the following     No orders found for display       Primary Care Provider Office Phone # Fax #    Margot Razo PA-C [phone]       [address] [place] [address]        Equal Access to Services     DIANA HADDAD : Hadii aad ku hadasho Soomaali, waaxda luqadaha, qaybta kaalmada adeegyada, waxay idiin haysandy muro . So [facility] [phone].    ATENCIÓN: Si habla español, tiene a zhao disposición servicios gratuitos de asistencia lingüística. LlJoint [facility] [phone].    We comply with applicable federal civil rights laws and [place] laws. We do not discriminate on the basis of race, color, national origin, age, disability, sex, sexual orientation, or gender identity.            Thank you!     Thank you for choosing [facility]  for your care. Our goal is always to provide you with excellent care. Hearing back from our patients is one way we can continue to improve our services. Please take a few minutes to complete the written survey that you may receive in the mail after your visit with us. Thank you!             Your Updated Medication List - Protect others around you: Learn how to safely use, store and throw away your medicines at www.disposemymeds.org.          This list is accurate as of 11/26/18 10:23 AM.  Always use your most " recent med list.                   Brand Name Dispense Instructions for use Diagnosis    OMEGA 3 500 PO           prenatal multivitamin plus iron 27-0.8 MG Tabs per tablet      Take 1 tablet by mouth daily        rizatriptan 10 MG tablet    MAXALT    36 tablet    Take 1 tablet (10 mg) by mouth as needed for migraine    Migraine without aura and without status migrainosus, not intractable       VITAMIN D (CHOLECALCIFEROL) PO      Take 2,000 Units by mouth daily

## 2018-11-26 NOTE — PROGRESS NOTES
"Subjective:     33 year old  at 37w2d presents for a routine prenatal appointment.         Denies vaginal bleeding,  leakage of fluid, or change in vaginal discharge.  Denies contractions.  Endorses fetal movement.     No HA, visual changes, RUQ or epigastric pain.   Patient concerns: Is feeling well overall. Wanting more info on how to do exercise safely in pregnancy after back strain last week.     Objective:  Vitals:    18 0934   BP: 104/67   Pulse: 64   Weight: 73 kg (161 lb)   Height: 1.676 m (5' 6\")   See OB flowsheet    Assessment/Plan     Encounter Diagnosis   Name Primary?     Encounter for supervision of normal first pregnancy in third trimester Yes       PHQ-9 SCORE 2018   Total Score MyChart - - -   Total Score 8 4 8     PHQ-9 SCORE 2018   Total Score MyChart - - -   Total Score 8 4 8     GBS screening: negative  Birth preferences reviewed: Un-Medicated, Water birth and Feeding preference breast  Labor signs discussed, reviewed early labor management.   Discussed when/why we would do cervical exams prior to active labor.   Reinforced daily fetal movement counts.  Discussed caring for back while exercising in third trimester, made recommendations for exercise and adding belly band in third tri if needed. Provided info on cat/cow and Spinning Babies.   Reviewed why/how to contact provider if headache/visual changes/RUQ or epigastric pain, decreased fetal movement, vaginal bleeding, leakage of fluid.  Do repeat CBC with platelets at next appt, stable gestational thrombocytopenia at 119/120 since Sept.  Return to clinic in 1 week and prn if questions or concerns.       Jeri Rodrigues CNM  "

## 2018-12-03 ENCOUNTER — OFFICE VISIT (OUTPATIENT)
Dept: OBGYN | Facility: CLINIC | Age: 33
End: 2018-12-03
Attending: ADVANCED PRACTICE MIDWIFE
Payer: COMMERCIAL

## 2018-12-03 VITALS
HEIGHT: 66 IN | WEIGHT: 158.7 LBS | BODY MASS INDEX: 25.51 KG/M2 | SYSTOLIC BLOOD PRESSURE: 115 MMHG | DIASTOLIC BLOOD PRESSURE: 72 MMHG | HEART RATE: 83 BPM

## 2018-12-03 DIAGNOSIS — O09.93 HRP (HIGH RISK PREGNANCY), THIRD TRIMESTER: Primary | ICD-10-CM

## 2018-12-03 LAB
ERYTHROCYTE [DISTWIDTH] IN BLOOD BY AUTOMATED COUNT: 12.7 % (ref 10–15)
HCT VFR BLD AUTO: 38.5 % (ref 35–47)
HGB BLD-MCNC: 12.8 G/DL (ref 11.7–15.7)
MCH RBC QN AUTO: 30.5 PG (ref 26.5–33)
MCHC RBC AUTO-ENTMCNC: 33.2 G/DL (ref 31.5–36.5)
MCV RBC AUTO: 92 FL (ref 78–100)
PLATELET # BLD AUTO: 108 10E9/L (ref 150–450)
RBC # BLD AUTO: 4.19 10E12/L (ref 3.8–5.2)
WBC # BLD AUTO: 7.7 10E9/L (ref 4–11)

## 2018-12-03 PROCEDURE — 36415 COLL VENOUS BLD VENIPUNCTURE: CPT | Performed by: ADVANCED PRACTICE MIDWIFE

## 2018-12-03 PROCEDURE — 85027 COMPLETE CBC AUTOMATED: CPT | Performed by: ADVANCED PRACTICE MIDWIFE

## 2018-12-03 PROCEDURE — G0463 HOSPITAL OUTPT CLINIC VISIT: HCPCS | Mod: ZF

## 2018-12-03 ASSESSMENT — PAIN SCALES - GENERAL: PAINLEVEL: NO PAIN (0)

## 2018-12-03 NOTE — NURSING NOTE
Chief Complaint   Patient presents with     Prenatal Care   38.1 week ob visit more pressure this week  occ cramping

## 2018-12-03 NOTE — LETTER
"12/3/2018       RE: Sherita Glasgow  [address]  [address]  [place] [address]     Dear Colleague,    Thank you for referring your patient, Sherita Glasgow, to the [facility] at [facility]. Please see a copy of my visit note below.    Subjective:     33 year old  at 38w2d presents for routine prenatal visit.            Denies vaginal bleeding or leakage of fluid.  Denies contractions.  Reports fetal movement.        No HA, visual changes, RUQ or epigastric pain.   Patient concerns: No concerns today.  Feeling well overall.  Objective:  Vitals:    18 0937   BP: 115/72   Pulse: 83   Weight: 72 kg (158 lb 11.2 oz)   Height: 1.676 m (5' 5.98\")    See OB flowsheet  Assessment/Plan     Encounter Diagnosis   Name Primary?     HRP (high risk pregnancy), third trimester Yes     Orders Placed This Encounter   Procedures     CBC with platelets       - Reviewed why/how to contact provider if headache/visual changes/RUQ or epigastric pain, decreased fetal movement, vaginal bleeding, leakage of fluid or strong/regular contractions.   Patient education/orders or handouts today:  Sign/symptoms of labor and When to call for labor or other concerns  Return to clinic in 1 week and prn if questions or concerns.   Patient to have CBC today.  BINH Cummins CNM    "

## 2018-12-10 ENCOUNTER — OFFICE VISIT (OUTPATIENT)
Dept: OBGYN | Facility: CLINIC | Age: 33
End: 2018-12-10
Attending: ADVANCED PRACTICE MIDWIFE
Payer: COMMERCIAL

## 2018-12-10 VITALS
HEIGHT: 66 IN | DIASTOLIC BLOOD PRESSURE: 78 MMHG | HEART RATE: 78 BPM | BODY MASS INDEX: 26 KG/M2 | WEIGHT: 161.8 LBS | SYSTOLIC BLOOD PRESSURE: 121 MMHG

## 2018-12-10 DIAGNOSIS — O09.93 HRP (HIGH RISK PREGNANCY), THIRD TRIMESTER: Primary | ICD-10-CM

## 2018-12-10 DIAGNOSIS — D69.6 THROMBOCYTOPENIA (H): ICD-10-CM

## 2018-12-10 LAB
ERYTHROCYTE [DISTWIDTH] IN BLOOD BY AUTOMATED COUNT: 12.7 % (ref 10–15)
HCT VFR BLD AUTO: 39.1 % (ref 35–47)
HGB BLD-MCNC: 13.4 G/DL (ref 11.7–15.7)
MCH RBC QN AUTO: 31.1 PG (ref 26.5–33)
MCHC RBC AUTO-ENTMCNC: 34.3 G/DL (ref 31.5–36.5)
MCV RBC AUTO: 91 FL (ref 78–100)
PLATELET # BLD AUTO: 114 10E9/L (ref 150–450)
RBC # BLD AUTO: 4.31 10E12/L (ref 3.8–5.2)
WBC # BLD AUTO: 8.2 10E9/L (ref 4–11)

## 2018-12-10 PROCEDURE — G0463 HOSPITAL OUTPT CLINIC VISIT: HCPCS | Mod: ZF

## 2018-12-10 PROCEDURE — 36415 COLL VENOUS BLD VENIPUNCTURE: CPT | Performed by: ADVANCED PRACTICE MIDWIFE

## 2018-12-10 PROCEDURE — 85027 COMPLETE CBC AUTOMATED: CPT | Performed by: ADVANCED PRACTICE MIDWIFE

## 2018-12-10 ASSESSMENT — MIFFLIN-ST. JEOR: SCORE: 1465.67

## 2018-12-10 ASSESSMENT — PAIN SCALES - GENERAL: PAINLEVEL: NO PAIN (0)

## 2018-12-10 NOTE — LETTER
"12/10/2018       RE: Sherita Glasgow  [address]  [address]  [place] [address]     Dear Colleague,    Thank you for referring your patient, Sherita Glasgow, to the [facility] at [facility]. Please see a copy of my visit note below.    Subjective:     33 year old  at 39w2d presents for routine prenatal visit.    Declines cervical exam.  Asking about what to do if platelets are low for birth and postdates testing    No vaginal bleeding or leakage of fluid. No contractions.  Positive fetal movement.        No HA, visual changes, RUQ or epigastric pain.   Patient concerns: Feeling well overall.  Objective:  Fetal presentation: cephalic by Leopolds  Vitals:    12/10/18 0936   BP: 121/78   Pulse: 78   Weight: 73.4 kg (161 lb 12.8 oz)   Height: 1.676 m (5' 6\")    See OB flowsheet  Assessment/Plan     Encounter Diagnoses   Name Primary?     HRP (high risk pregnancy), third trimester Yes     Thrombocytopenia (H)      Orders Placed This Encounter   Procedures     CBC with platelets       - Reviewed postdates testing including BPP => 41 weeks and rationale for induction of labor based on results.   Patient undecided on induction or postdates testing. Will schedule BPP for now.   - Reviewed why/how to contact provider if headache/visual changes/RUQ or epigastric pain, decreased fetal movement, vaginal bleeding, leakage of fluid or strong/regular contractions.   Patient education/orders or handouts today: Labor, when to call  Platelets today. Answered questions about thrombocytopenia.   Return to clinic in 1 week and prn if questions or concerns.   BINH Bell CNM      Again, thank you for allowing me to participate in the care of your patient.      Sincerely,    BINH Bell CNM      "

## 2018-12-10 NOTE — PROGRESS NOTES
"Subjective:     33 year old  at 39w2d presents for routine prenatal visit.    Declines cervical exam.  Asking about what to do if platelets are low for birth and postdates testing    No vaginal bleeding or leakage of fluid. No contractions.  Positive fetal movement.        No HA, visual changes, RUQ or epigastric pain.   Patient concerns: Feeling well overall.  Objective:  Fetal presentation: cephalic by Leopolds  Vitals:    12/10/18 0936   BP: 121/78   Pulse: 78   Weight: 73.4 kg (161 lb 12.8 oz)   Height: 1.676 m (5' 6\")    See OB flowsheet  Assessment/Plan     Encounter Diagnoses   Name Primary?     HRP (high risk pregnancy), third trimester Yes     Thrombocytopenia (H)      Orders Placed This Encounter   Procedures     CBC with platelets       - Reviewed postdates testing including BPP => 41 weeks and rationale for induction of labor based on results.   Patient undecided on induction or postdates testing. Will schedule BPP for now.   - Reviewed why/how to contact provider if headache/visual changes/RUQ or epigastric pain, decreased fetal movement, vaginal bleeding, leakage of fluid or strong/regular contractions.   Patient education/orders or handouts today: Labor, when to call  Platelets today. Answered questions about thrombocytopenia.   Return to clinic in 1 week and prn if questions or concerns.   BINH Bell CNM    "

## 2018-12-17 ENCOUNTER — OFFICE VISIT (OUTPATIENT)
Dept: OBGYN | Facility: CLINIC | Age: 33
End: 2018-12-17
Attending: ADVANCED PRACTICE MIDWIFE
Payer: COMMERCIAL

## 2018-12-17 VITALS
DIASTOLIC BLOOD PRESSURE: 72 MMHG | HEART RATE: 67 BPM | WEIGHT: 163.4 LBS | SYSTOLIC BLOOD PRESSURE: 104 MMHG | BODY MASS INDEX: 26.37 KG/M2

## 2018-12-17 DIAGNOSIS — D69.6 THROMBOCYTOPENIA (H): ICD-10-CM

## 2018-12-17 DIAGNOSIS — Z34.03 ENCOUNTER FOR SUPERVISION OF NORMAL FIRST PREGNANCY IN THIRD TRIMESTER: Primary | ICD-10-CM

## 2018-12-17 LAB
ERYTHROCYTE [DISTWIDTH] IN BLOOD BY AUTOMATED COUNT: 12.5 % (ref 10–15)
HCT VFR BLD AUTO: 38.9 % (ref 35–47)
HGB BLD-MCNC: 13.2 G/DL (ref 11.7–15.7)
MCH RBC QN AUTO: 30.5 PG (ref 26.5–33)
MCHC RBC AUTO-ENTMCNC: 33.9 G/DL (ref 31.5–36.5)
MCV RBC AUTO: 90 FL (ref 78–100)
PLATELET # BLD AUTO: 126 10E9/L (ref 150–450)
RBC # BLD AUTO: 4.33 10E12/L (ref 3.8–5.2)
WBC # BLD AUTO: 8.8 10E9/L (ref 4–11)

## 2018-12-17 PROCEDURE — 85027 COMPLETE CBC AUTOMATED: CPT | Performed by: ADVANCED PRACTICE MIDWIFE

## 2018-12-17 PROCEDURE — 36415 COLL VENOUS BLD VENIPUNCTURE: CPT | Performed by: ADVANCED PRACTICE MIDWIFE

## 2018-12-17 PROCEDURE — G0463 HOSPITAL OUTPT CLINIC VISIT: HCPCS | Mod: ZF

## 2018-12-17 ASSESSMENT — PAIN SCALES - GENERAL: PAINLEVEL: NO PAIN (0)

## 2018-12-17 NOTE — NURSING NOTE
Chief Complaint   Patient presents with     Prenatal Care     40 weeks 2 days      Health Maintenance Due   Topic Date Due     DEPRESSION ACTION PLAN  09/06/2005     MATERNAL SCREENING  06/23/2018     REPEAT ANTIBODY SCREEN (OB)  09/22/2018     RH IMMUNE GLOBULIN (OB)  09/22/2018     GROUP B STREP SCREENING  11/17/2018     Brenna Snell CMA on 12/17/2018 at 9:43 AM

## 2018-12-17 NOTE — PROGRESS NOTES
"Subjective:     33 year old  at 40w2d presents for routine prenatal visit.           Denies cramping/contractions. Denies vaginal bleeding, discharge or leakage of fluid. Reports +fetal movement.  No HA, vision changes, ruq/epigastric pain.      Patient concerns: Feeling well overall. Wondering about plan for when she is over 41 weeks. Desires late term testing vs induction of labor. Inquiring about ways to induce labor and asking about membrane stripping. Patient declines SVE this visit, would prefer to do on Friday. Has BPP and CECELIA scheduled for .     Objective:  Vitals:    18 0944   BP: 104/72   BP Location: Right arm   Patient Position: Sitting   Cuff Size: Adult Regular   Pulse: 67   Weight: 74.1 kg (163 lb 6.4 oz)      See OB flowsheet    Assessment/Plan     Encounter Diagnosis   Name Primary?     Encounter for supervision of normal first pregnancy in third trimester Yes     Orders Placed This Encounter   Procedures     CBC with Platelets     - Reviewed why/how to contact provider if headache/visual changes/RUQ or epigastric pain, decreased fetal movement, vaginal bleeding, leakage of fluid or strong/regular contractions.   Patient education/orders or handouts today:  Sign/symptoms of labor, When to call for labor or other concerns, Postdates testing discussion, BPP, NST and Induction of labor    - Reviewed late term testing including BPP => 41 weeks and rationale for induction of labor based on results.   Patient desires  late term testing.  - BPP and CECELIA scheduled for 18.  - NST scheduled for 18 @ 0900. Instructions for arrival to BP reviewed.   - CBC with plts ordered d/t gestational thrombocytopenia.     Continue scheduled prenatal care, RTC on  for BPP and CECELIA.    I, Isha Padgett am serving as a scribe to document services personally performed by Gordon Garcia CNM based on the provider's statements to me.\" - LYLY Torrez     The encounter was performed by me " and scribed by the SNM. The scribed note accurately reflects my personal services and decisions made by me.     BINH Joshi, SHANTAM

## 2018-12-17 NOTE — LETTER
2018       RE: Sherita Glasgow  [address]  [address]  [place] [address]     Dear Colleague,    Thank you for referring your patient, Sherita Glasgow, to the [facility] at [facility]. Please see a copy of my visit note below.    Subjective:     33 year old  at 40w2d presents for routine prenatal visit.           Denies cramping/contractions. Denies vaginal bleeding, discharge or leakage of fluid. Reports +fetal movement.  No HA, vision changes, ruq/epigastric pain.      Patient concerns: Feeling well overall. Wondering about plan for when she is over 41 weeks. Desires late term testing vs induction of labor. Inquiring about ways to induce labor and asking about membrane stripping. Patient declines SVE this visit, would prefer to do on Friday. Has BPP and CECELIA scheduled for .     Objective:  Vitals:    18 0944   BP: 104/72   BP Location: Right arm   Patient Position: Sitting   Cuff Size: Adult Regular   Pulse: 67   Weight: 74.1 kg (163 lb 6.4 oz)      See OB flowsheet    Assessment/Plan     Encounter Diagnosis   Name Primary?     Encounter for supervision of normal first pregnancy in third trimester Yes     Orders Placed This Encounter   Procedures     CBC with Platelets     - Reviewed why/how to contact provider if headache/visual changes/RUQ or epigastric pain, decreased fetal movement, vaginal bleeding, leakage of fluid or strong/regular contractions.   Patient education/orders or handouts today:  Sign/symptoms of labor, When to call for labor or other concerns, Postdates testing discussion, BPP, NST and Induction of labor    - Reviewed late term testing including BPP => 41 weeks and rationale for induction of labor based on results.   Patient desires  late term testing.  - BPP and CECELIA scheduled for 18.  - NST scheduled for 18 @ 0900. Instructions for arrival to BP reviewed.   - CBC with plts ordered d/t  "gestational thrombocytopenia.     Continue scheduled prenatal care, RTC on 12/21 for BPP and CECELIA.    I, Isha Padgett am serving as a scribe to document services personally performed by Gordon Garcia CNM based on the provider's statements to me.\" - LYLY Torrez     The encounter was performed by me and scribed by the SNM. The scribed note accurately reflects my personal services and decisions made by me.     BINH Joshi CNM  "

## 2018-12-21 ENCOUNTER — ANCILLARY PROCEDURE (OUTPATIENT)
Dept: ULTRASOUND IMAGING | Facility: CLINIC | Age: 33
End: 2018-12-21
Attending: ADVANCED PRACTICE MIDWIFE
Payer: COMMERCIAL

## 2018-12-21 ENCOUNTER — OFFICE VISIT (OUTPATIENT)
Dept: OBGYN | Facility: CLINIC | Age: 33
End: 2018-12-21
Attending: ADVANCED PRACTICE MIDWIFE
Payer: COMMERCIAL

## 2018-12-21 VITALS — WEIGHT: 162.3 LBS | HEIGHT: 66 IN | BODY MASS INDEX: 26.08 KG/M2

## 2018-12-21 DIAGNOSIS — O09.93 HRP (HIGH RISK PREGNANCY), THIRD TRIMESTER: ICD-10-CM

## 2018-12-21 DIAGNOSIS — O47.9: Primary | ICD-10-CM

## 2018-12-21 DIAGNOSIS — Z34.03 ENCOUNTER FOR SUPERVISION OF NORMAL FIRST PREGNANCY IN THIRD TRIMESTER: Primary | ICD-10-CM

## 2018-12-21 PROCEDURE — 76819 FETAL BIOPHYS PROFIL W/O NST: CPT

## 2018-12-21 PROCEDURE — G0463 HOSPITAL OUTPT CLINIC VISIT: HCPCS | Mod: 25,ZF

## 2018-12-21 RX ORDER — HYDROXYZINE HYDROCHLORIDE 25 MG/1
50 TABLET, FILM COATED ORAL ONCE
Qty: 2 TABLET | Refills: 1 | Status: ON HOLD | OUTPATIENT
Start: 2018-12-21 | End: 2018-12-23

## 2018-12-21 ASSESSMENT — MIFFLIN-ST. JEOR: SCORE: 1467.69

## 2018-12-21 ASSESSMENT — PAIN SCALES - GENERAL: PAINLEVEL: NO PAIN (0)

## 2018-12-21 NOTE — NURSING NOTE
Chief Complaint   Patient presents with     Prenatal Care   40.6 weeks and bpp today- since  yesturday  Having irregular contractions  Every 10-15 min  Lasting 20 seconds/.

## 2018-12-21 NOTE — LETTER
"2018       RE: Sherita Glasgow  [address]  [address]  [place] [address]     Dear Colleague,    Thank you for referring your patient, Sherita Glasgow, to the [facility] at [facility]. Please see a copy of my visit note below.    Subjective:     33 year old  at 40w6d presents for routine prenatal visit.   Denies vaginal bleeding or leakage of fluid.  Denies contractions.  Endorses fetal movement.        No HA, visual changes, RUQ or epigastric pain.   Patient concerns: Would like a cervical exam and a membrane sweep if possible, she has been having mild irregular contractions since last evening. Feeling well overall.    Objective:  Vitals:    18 0959   Weight: 73.6 kg (162 lb 4.8 oz)   Height: 1.676 m (5' 5.98\")   See OB flowsheet    SVE: 2.5/70/-2 soft, posterior     US: BPP 8/8, normal JOYCE    Assessment/Plan     Encounter Diagnosis   Name Primary?     Encounter for supervision of normal first pregnancy in third trimester Yes     Orders Placed This Encounter   Procedures     CBC with platelets     - Reviewed postdates testing including BPP => 41 weeks and rationale for induction of labor based on results.  Patient desires postdates testing.  -Discussed option of doing a membrane sweep risks/benefits were explained and the option of doing nothing provided. Pt and spouse chose to move forward with sweep today.   - Reviewed why/how to contact provider if headache/visual changes/RUQ or epigastric pain, decreased fetal movement, vaginal bleeding, leakage of fluid or strong/regular contractions.   Patient education/orders or handouts today:  Sign/symptoms of labor, When to call for labor or other concerns, Postdates testing discussion, BPP, NST and Induction of labor  -Still hoping for a water birth.   -Return to  on Monday for NST, and in clinic at the end of next week, questions or concerns.     Jeri Rodrigues CNM    "

## 2018-12-21 NOTE — PROGRESS NOTES
"Subjective:     33 year old  at 40w6d presents for routine prenatal visit.   Denies vaginal bleeding or leakage of fluid.  Denies contractions.  Endorses fetal movement.        No HA, visual changes, RUQ or epigastric pain.   Patient concerns: Would like a cervical exam and a membrane sweep if possible, she has been having mild irregular contractions since last evening. Feeling well overall.    Objective:  Vitals:    18 0959   Weight: 73.6 kg (162 lb 4.8 oz)   Height: 1.676 m (5' 5.98\")   See OB flowsheet    SVE: 2.5/70/-2 soft, posterior     US: BPP , normal JOYCE    Assessment/Plan     Encounter Diagnosis   Name Primary?     Encounter for supervision of normal first pregnancy in third trimester Yes     Orders Placed This Encounter   Procedures     CBC with platelets     - Reviewed postdates testing including BPP => 41 weeks and rationale for induction of labor based on results.  Patient desires postdates testing.  -Discussed option of doing a membrane sweep risks/benefits were explained and the option of doing nothing provided. Pt and spouse chose to move forward with sweep today.   - Reviewed why/how to contact provider if headache/visual changes/RUQ or epigastric pain, decreased fetal movement, vaginal bleeding, leakage of fluid or strong/regular contractions.   Patient education/orders or handouts today:  Sign/symptoms of labor, When to call for labor or other concerns, Postdates testing discussion, BPP, NST and Induction of labor  -Still hoping for a water birth.   -Return to  on Monday for NST, and in clinic at the end of next week, questions or concerns.     Jeri Rodrigues CNM        "

## 2018-12-22 ENCOUNTER — HOSPITAL ENCOUNTER (INPATIENT)
Facility: CLINIC | Age: 33
LOS: 2 days | Discharge: HOME | End: 2018-12-24
Attending: ADVANCED PRACTICE MIDWIFE | Admitting: ADVANCED PRACTICE MIDWIFE
Payer: COMMERCIAL

## 2018-12-22 DIAGNOSIS — D64.9 ANEMIA, UNSPECIFIED TYPE: Primary | ICD-10-CM

## 2018-12-22 PROBLEM — Z36.89 ENCOUNTER FOR TRIAGE IN PREGNANT PATIENT: Status: ACTIVE | Noted: 2018-12-22

## 2018-12-22 LAB
ABO + RH BLD: NORMAL
ABO + RH BLD: NORMAL
BASOPHILS # BLD AUTO: 0 10E9/L (ref 0–0.2)
BASOPHILS NFR BLD AUTO: 0.1 %
BLD GP AB SCN SERPL QL: NORMAL
BLOOD BANK CMNT PATIENT-IMP: NORMAL
DIFFERENTIAL METHOD BLD: ABNORMAL
EOSINOPHIL # BLD AUTO: 0 10E9/L (ref 0–0.7)
EOSINOPHIL NFR BLD AUTO: 0 %
ERYTHROCYTE [DISTWIDTH] IN BLOOD BY AUTOMATED COUNT: 12.4 % (ref 10–15)
HCT VFR BLD AUTO: 40.3 % (ref 35–47)
HGB BLD-MCNC: 13.8 G/DL (ref 11.7–15.7)
IMM GRANULOCYTES # BLD: 0.1 10E9/L (ref 0–0.4)
IMM GRANULOCYTES NFR BLD: 0.5 %
LYMPHOCYTES # BLD AUTO: 1.2 10E9/L (ref 0.8–5.3)
LYMPHOCYTES NFR BLD AUTO: 9.5 %
MCH RBC QN AUTO: 30.6 PG (ref 26.5–33)
MCHC RBC AUTO-ENTMCNC: 34.2 G/DL (ref 31.5–36.5)
MCV RBC AUTO: 89 FL (ref 78–100)
MONOCYTES # BLD AUTO: 0.4 10E9/L (ref 0–1.3)
MONOCYTES NFR BLD AUTO: 3.6 %
NEUTROPHILS # BLD AUTO: 10.6 10E9/L (ref 1.6–8.3)
NEUTROPHILS NFR BLD AUTO: 86.3 %
NRBC # BLD AUTO: 0 10*3/UL
NRBC BLD AUTO-RTO: 0 /100
PLATELET # BLD AUTO: 124 10E9/L (ref 150–450)
RBC # BLD AUTO: 4.51 10E12/L (ref 3.8–5.2)
SPECIMEN EXP DATE BLD: NORMAL
T PALLIDUM AB SER QL: NONREACTIVE
WBC # BLD AUTO: 12.3 10E9/L (ref 4–11)

## 2018-12-22 PROCEDURE — 12000030 ZZH R&B OB INTERMEDIATE UMMC

## 2018-12-22 PROCEDURE — 25000125 ZZHC RX 250

## 2018-12-22 PROCEDURE — 25000125 ZZHC RX 250: Performed by: ADVANCED PRACTICE MIDWIFE

## 2018-12-22 PROCEDURE — G0463 HOSPITAL OUTPT CLINIC VISIT: HCPCS | Mod: 25

## 2018-12-22 PROCEDURE — 72200001 ZZH LABOR CARE VAGINAL DELIVERY SINGLE

## 2018-12-22 PROCEDURE — 86780 TREPONEMA PALLIDUM: CPT | Performed by: ADVANCED PRACTICE MIDWIFE

## 2018-12-22 PROCEDURE — 25000132 ZZH RX MED GY IP 250 OP 250 PS 637: Performed by: ADVANCED PRACTICE MIDWIFE

## 2018-12-22 PROCEDURE — 25000128 H RX IP 250 OP 636: Performed by: ADVANCED PRACTICE MIDWIFE

## 2018-12-22 PROCEDURE — 86900 BLOOD TYPING SEROLOGIC ABO: CPT | Performed by: ADVANCED PRACTICE MIDWIFE

## 2018-12-22 PROCEDURE — 86901 BLOOD TYPING SEROLOGIC RH(D): CPT | Performed by: ADVANCED PRACTICE MIDWIFE

## 2018-12-22 PROCEDURE — 10907ZC DRAINAGE OF AMNIOTIC FLUID, THERAPEUTIC FROM PRODUCTS OF CONCEPTION, VIA NATURAL OR ARTIFICIAL OPENING: ICD-10-PCS | Performed by: ADVANCED PRACTICE MIDWIFE

## 2018-12-22 PROCEDURE — 86850 RBC ANTIBODY SCREEN: CPT | Performed by: ADVANCED PRACTICE MIDWIFE

## 2018-12-22 PROCEDURE — 85025 COMPLETE CBC W/AUTO DIFF WBC: CPT | Performed by: ADVANCED PRACTICE MIDWIFE

## 2018-12-22 PROCEDURE — 0KQM0ZZ REPAIR PERINEUM MUSCLE, OPEN APPROACH: ICD-10-PCS | Performed by: ADVANCED PRACTICE MIDWIFE

## 2018-12-22 PROCEDURE — 40000977 ZZH STATISTIC ATTENDANCE AT DELIVERY

## 2018-12-22 RX ORDER — OXYTOCIN 10 [USP'U]/ML
INJECTION, SOLUTION INTRAMUSCULAR; INTRAVENOUS
Status: DISCONTINUED
Start: 2018-12-22 | End: 2018-12-22 | Stop reason: HOSPADM

## 2018-12-22 RX ORDER — ACETAMINOPHEN 325 MG/1
650 TABLET ORAL EVERY 4 HOURS PRN
Status: DISCONTINUED | OUTPATIENT
Start: 2018-12-22 | End: 2018-12-22

## 2018-12-22 RX ORDER — HYDROCORTISONE 2.5 %
CREAM (GRAM) TOPICAL 3 TIMES DAILY PRN
Status: DISCONTINUED | OUTPATIENT
Start: 2018-12-22 | End: 2018-12-24 | Stop reason: HOSPADM

## 2018-12-22 RX ORDER — OXYTOCIN/0.9 % SODIUM CHLORIDE 30/500 ML
PLASTIC BAG, INJECTION (ML) INTRAVENOUS
Status: COMPLETED
Start: 2018-12-22 | End: 2018-12-22

## 2018-12-22 RX ORDER — OXYTOCIN 10 [USP'U]/ML
10 INJECTION, SOLUTION INTRAMUSCULAR; INTRAVENOUS
Status: DISCONTINUED | OUTPATIENT
Start: 2018-12-22 | End: 2018-12-22

## 2018-12-22 RX ORDER — OXYTOCIN 10 [USP'U]/ML
10 INJECTION, SOLUTION INTRAMUSCULAR; INTRAVENOUS
Status: DISCONTINUED | OUTPATIENT
Start: 2018-12-22 | End: 2018-12-24 | Stop reason: HOSPADM

## 2018-12-22 RX ORDER — MISOPROSTOL 200 UG/1
TABLET ORAL
Status: DISCONTINUED
Start: 2018-12-22 | End: 2018-12-22 | Stop reason: HOSPADM

## 2018-12-22 RX ORDER — METHYLERGONOVINE MALEATE 0.2 MG/ML
200 INJECTION INTRAVENOUS
Status: DISCONTINUED | OUTPATIENT
Start: 2018-12-22 | End: 2018-12-22

## 2018-12-22 RX ORDER — LIDOCAINE 40 MG/G
CREAM TOPICAL
Status: DISCONTINUED | OUTPATIENT
Start: 2018-12-22 | End: 2018-12-22

## 2018-12-22 RX ORDER — SODIUM CHLORIDE, SODIUM LACTATE, POTASSIUM CHLORIDE, CALCIUM CHLORIDE 600; 310; 30; 20 MG/100ML; MG/100ML; MG/100ML; MG/100ML
INJECTION, SOLUTION INTRAVENOUS CONTINUOUS
Status: DISCONTINUED | OUTPATIENT
Start: 2018-12-22 | End: 2018-12-22

## 2018-12-22 RX ORDER — NALOXONE HYDROCHLORIDE 0.4 MG/ML
.1-.4 INJECTION, SOLUTION INTRAMUSCULAR; INTRAVENOUS; SUBCUTANEOUS
Status: DISCONTINUED | OUTPATIENT
Start: 2018-12-22 | End: 2018-12-24 | Stop reason: HOSPADM

## 2018-12-22 RX ORDER — OXYCODONE AND ACETAMINOPHEN 5; 325 MG/1; MG/1
1 TABLET ORAL
Status: DISCONTINUED | OUTPATIENT
Start: 2018-12-22 | End: 2018-12-22

## 2018-12-22 RX ORDER — OXYTOCIN/0.9 % SODIUM CHLORIDE 30/500 ML
100 PLASTIC BAG, INJECTION (ML) INTRAVENOUS CONTINUOUS
Status: DISCONTINUED | OUTPATIENT
Start: 2018-12-22 | End: 2018-12-24 | Stop reason: HOSPADM

## 2018-12-22 RX ORDER — ONDANSETRON 2 MG/ML
4 INJECTION INTRAMUSCULAR; INTRAVENOUS EVERY 6 HOURS PRN
Status: DISCONTINUED | OUTPATIENT
Start: 2018-12-22 | End: 2018-12-22

## 2018-12-22 RX ORDER — AMOXICILLIN 250 MG
1 CAPSULE ORAL 2 TIMES DAILY
Status: DISCONTINUED | OUTPATIENT
Start: 2018-12-22 | End: 2018-12-24 | Stop reason: HOSPADM

## 2018-12-22 RX ORDER — CARBOPROST TROMETHAMINE 250 UG/ML
250 INJECTION, SOLUTION INTRAMUSCULAR
Status: DISCONTINUED | OUTPATIENT
Start: 2018-12-22 | End: 2018-12-22

## 2018-12-22 RX ORDER — IBUPROFEN 800 MG/1
800 TABLET, FILM COATED ORAL EVERY 6 HOURS PRN
Status: DISCONTINUED | OUTPATIENT
Start: 2018-12-22 | End: 2018-12-24 | Stop reason: HOSPADM

## 2018-12-22 RX ORDER — ACETAMINOPHEN 325 MG/1
650 TABLET ORAL EVERY 4 HOURS PRN
Status: DISCONTINUED | OUTPATIENT
Start: 2018-12-22 | End: 2018-12-24 | Stop reason: HOSPADM

## 2018-12-22 RX ORDER — LANOLIN 100 %
OINTMENT (GRAM) TOPICAL
Status: DISCONTINUED | OUTPATIENT
Start: 2018-12-22 | End: 2018-12-24 | Stop reason: HOSPADM

## 2018-12-22 RX ORDER — NALOXONE HYDROCHLORIDE 0.4 MG/ML
.1-.4 INJECTION, SOLUTION INTRAMUSCULAR; INTRAVENOUS; SUBCUTANEOUS
Status: DISCONTINUED | OUTPATIENT
Start: 2018-12-22 | End: 2018-12-22

## 2018-12-22 RX ORDER — LIDOCAINE HYDROCHLORIDE 10 MG/ML
INJECTION, SOLUTION INFILTRATION; PERINEURAL
Status: DISCONTINUED
Start: 2018-12-22 | End: 2018-12-22 | Stop reason: HOSPADM

## 2018-12-22 RX ORDER — OXYTOCIN/0.9 % SODIUM CHLORIDE 30/500 ML
340 PLASTIC BAG, INJECTION (ML) INTRAVENOUS CONTINUOUS PRN
Status: DISCONTINUED | OUTPATIENT
Start: 2018-12-22 | End: 2018-12-24 | Stop reason: HOSPADM

## 2018-12-22 RX ORDER — IBUPROFEN 800 MG/1
800 TABLET, FILM COATED ORAL
Status: DISCONTINUED | OUTPATIENT
Start: 2018-12-22 | End: 2018-12-22

## 2018-12-22 RX ORDER — AMOXICILLIN 250 MG
2 CAPSULE ORAL 2 TIMES DAILY
Status: DISCONTINUED | OUTPATIENT
Start: 2018-12-22 | End: 2018-12-24 | Stop reason: HOSPADM

## 2018-12-22 RX ORDER — OXYTOCIN/0.9 % SODIUM CHLORIDE 30/500 ML
100-340 PLASTIC BAG, INJECTION (ML) INTRAVENOUS CONTINUOUS PRN
Status: COMPLETED | OUTPATIENT
Start: 2018-12-22 | End: 2018-12-22

## 2018-12-22 RX ORDER — BISACODYL 10 MG
10 SUPPOSITORY, RECTAL RECTAL DAILY PRN
Status: DISCONTINUED | OUTPATIENT
Start: 2018-12-24 | End: 2018-12-24 | Stop reason: HOSPADM

## 2018-12-22 RX ADMIN — SENNOSIDES AND DOCUSATE SODIUM 2 TABLET: 8.6; 5 TABLET ORAL at 21:11

## 2018-12-22 RX ADMIN — IBUPROFEN 800 MG: 800 TABLET ORAL at 16:33

## 2018-12-22 RX ADMIN — SODIUM CHLORIDE, POTASSIUM CHLORIDE, SODIUM LACTATE AND CALCIUM CHLORIDE 500 ML: 600; 310; 30; 20 INJECTION, SOLUTION INTRAVENOUS at 14:25

## 2018-12-22 RX ADMIN — Medication 340 ML/HR: at 15:18

## 2018-12-22 RX ADMIN — OXYTOCIN-SODIUM CHLORIDE 0.9% IV SOLN 30 UNIT/500ML 340 ML/HR: 30-0.9/5 SOLUTION at 15:18

## 2018-12-22 RX ADMIN — LIDOCAINE HYDROCHLORIDE 10 ML: 10 INJECTION, SOLUTION INFILTRATION; PERINEURAL at 15:26

## 2018-12-22 ASSESSMENT — MIFFLIN-ST. JEOR: SCORE: 1466.58

## 2018-12-22 NOTE — L&D DELIVERY NOTE
Sherita Glasgow is a 33 year old   at  41w0d presented to labor floor with c/o labor, admit SVE 6-7/80/0 at 0445.  Pt spent time in waterbirth tub and had increase rectal pressure, follow up SVE at 0838 showed 7-8/80/-1, pt declined AROM, decided to get out of tub for ambulation/hands and knees.  Rechecked SVE at 1046 with 8/80/-1 with BBOW, pt consented to AROM for moderate clear fluid.  Used nipple stimulation, position changes, hydrotherapy and had SVE noted 8.5 (with more cervix to maternal R, almost alip on maternal L) at 1230 was sifted and then assisted back into water birth tub.  Had increase in rectal pressure and was found to be C/C/+1 with caput to +2 at 1440. Pushed effectively with CNM coaching but didn't feel like she could connect in the waterbirth tub so was assisted out to bed. FHR with variable decels with pushing effort with IA so EFM replaced and periods of minimal variability noted.  Continuous EFM remained in place, IV fluid bolus, 02@10L.  Pt pushed effectively with CNM coaching and NICU called to delivery d/t category 2 FHR tracing.  Head delivered OA and restituted to ROT.  Nuchal cord x1 . Shoulders delivered under maternal effort after reduction of right nuchal hand.  Live male  delivered at 1315 over a perineal laceration under no anesthesia.  Spontaneous breath, vigorous cry, well flexed, HR>100. Infant directly to maternal abdomen, skin to skin.  Assessed by NICU team on maternal abdomen, allowed to remain with pt.  Delayed cord clamping for 5 minutes then clamped x2 and cut by FOB.  IV pitocin infusing after baby.  Cord blood obtained for typing.  Cord segment for gases.  Intact placenta spontaneously delivered via G[place]ia at 1521.   3 vessel cord. Fundus firm @ u after massage.   Vagina, and rectum inspected, questionable frayed capsule noted.  Alexander SIMS in to confirm no 3rd degree laceration and capsule not requiring reinforcement. .  2nd degree perineal laceration  repaired with 3-0 vicryl suture on a CT in the usual fashion after local infiltration with 1% lidoacine.  Hemostasis noted.    Pt tolerated well. Mother and infant stable, continued skin to skin.    Apgars 8/9.  Weight pending QBL 361ml    Cord gases WNL.         Delivery Note  IUP at 41 weeks gestation delivered on 2018.     delivery of a viable Male infant.  Weight : pending  Apgars of 8 at 1 minute and 9 at 5 minutes.  Labor was augmented with AROM  Medications administered  in labor:  Pain Rx none; Antibiotics No; Other None  Perineum: 2nd degree  Placenta-mechanism: spontaneous, intact,  with a 3 vessel cord. IV oxytocin was given.  Quantitative Blood Loss was 361ml  Complications of pregnancy, labor and delivery: Dysfunctional labor and Repetative variables (60x60)  Birth attendants  Cindy PURCELL, CNM

## 2018-12-22 NOTE — PROGRESS NOTES
"Labor progress note    S: Pt noted ambulating in room, using step stool for lunges.  FOB and  at bedside, supportive.  Waterbirth tub being filled.      O:  Blood pressure 132/64, temperature 98  F (36.7  C), temperature source Oral, resp. rate 20, height 1.676 m (5' 6\"), weight 73.5 kg (162 lb), last menstrual period 03/10/2018, not currently breastfeeding.  General appearance: uncomfortable with contractions but coping.  Contractions: Every 3-5 minutes. 60-90 seconds duration.  Palpate: moderate.  Soft resting tone.   FHT: Baseline 140 with regular rhythm. increases not present. No decreases present per RN  ROM: not ruptured.   PELVIC EXAM:deferred      Pitocin- none,  Antibiotics- none  IV saline lock        # Pain Assessment:    - Sherita is experiencing pain due to labor. Pain management was discussed with Sherita and her significant other and doual and the plan was created in a collaborative fashion.  Sherita's response to the current recommendations: engaged  - Continues with non pharmacologic options        A:  33 year old  with IUP @ 41w0d active labor   Fetal Heart Rate Tracing no decreases heard with intermittent auscultation  GBS-   Gestational Thrombocytopenia    Patient Active Problem List   Diagnosis     Migraine without aura and without status migrainosus, not intractable     Adjustment disorder with mixed anxiety and depressed mood     Supervision of normal first pregnancy - [facility] CNM     Pt has mouthguard on L and D, please give it to pt for labor     Thrombocytopenia (H)     Encounter for triage in pregnant patient     Labor and delivery indication for care or intervention         P:  - Ambulation, hydration, position changes, birthing ball/sling and tub options to facilitate labor. Water birth tub filling.   - Continue intermittent auscultation per protocol.  Increase frequency or auscultation or reinstate continuous EFM if decreases heard.   - PCN prophylaxis prn if ROM " >18 hours for GBS  >37 weeks.   - Reevaluate in 2 hours for SVE and prn       Cindy PURCELL, SHANTAM

## 2018-12-22 NOTE — PROGRESS NOTES
"Labor progress note    S: Pt has been sitting up in throne position on shower chair using warm water hydrotherapy/shower with continuous labor support from FOB and .      O:  Blood pressure 122/80, temperature 97.3  F (36.3  C), temperature source Oral, resp. rate 18, height 1.676 m (5' 6\"), weight 73.5 kg (162 lb), last menstrual period 03/10/2018, not currently breastfeeding.  General appearance: uncomfortable with contractions but coping.  Contractions: Every 2-5 minutes. 50-80 seconds duration.  Palpate: moderate.  Soft resting tone.   FHT: Baseline 130 with regular rate. No increases. No decreases. ROM: clear fluid. Membranes have been ruptured for 2 hours.  PELVIC EXAM:deferred      Pitocin- none,  Antibiotics- none  IV saline lock  Had small BM after getting out of hydrotherapy tub         # Pain Assessment:    - Sherita is experiencing pain due to labor. Pain management was discussed with Sherita and her significant other and lenka and the plan was created in a collaborative fashion.  Sherita's response to the current recommendations: engaged  - Continue to utilize non pharmacologic options        A:  33 year old  with IUP @ 41w0d active labor   - AROM clear <18 hours, afebrile     Fetal Heart Rate Tracing no decreases heard with intermittent auscultation  GBS-   Gestational thromboctyopenia    Patient Active Problem List   Diagnosis     Migraine without aura and without status migrainosus, not intractable     Adjustment disorder with mixed anxiety and depressed mood     Supervision of normal first pregnancy - S CNM     Pt has mouthguard on L and D, please give it to pt for labor     Thrombocytopenia (H)     Encounter for triage in pregnant patient     Labor and delivery indication for care or intervention         P:   Encouraged continued PO hydration and light regular diet. If unable to PO hydrate then IV fluid bolus/infusion prn.   - Encouraged pt to rest when able, assisted to " standing lunges and then R lateral with peanut ball to rest between contractions.   - Now that ROM close maternal/fetal surveillance for s/s of infection.  Plan Prophylactic IV antibiotic for  GBS status and gestation >37 weeks only if ROM >18 hours.   - Continue close surveillance of amniotic fluid color, if continues clear can use waterbirth tub if all maternal/fetal surveillance is within protocol.   - Continue intermittent auscultation per protocol since cateogry 1 FHR tracing on EFM s/p AROM.  If decreases heard increase frequency of auscultation by doppler or resume EFM prn.   - Recheck 1-2 hours and prn     Cindy PURCELL CNM      ADDENDUM 2018 1230  CNM called to bedside with report of pt pushing.  Pt noted continued on R lateral side with peanut ball, having consistent rectal pressure, consents to SVE for plan of care.  SVE 8.5cm (more cervix noted on maternal R ~2cm.  Maternal L c/w 9 to alip but unable to reduce)/80/0.   No caput or molding.  Continued moderate clear fluid.  +moderate bloody show.  FHR baseline 130 bpm with regular rhyhrm, no increases, no decreases. Pt agreed to sifting which was done with pt in forward leaning position.  Pt desires to get back in waterbirth tub.  Consents to nipple stimulation for labor augmentation - reviewed safe procedure with FOB (stop stimulation when contraction begins and wait until contraction completely resolves before staritng again). Reviewed breathing patterns to assist with not actively pushing when not completely dilated.   Recheck in 1-2 hours and consider pitocin augmentation if no cervical change.   Bedside RN aware and charge RN updated. Continue rest of plan of care as above. Cindy PURCELL CNM

## 2018-12-22 NOTE — H&P
ADMIT NOTE  =================  41w0d    Sherita Glasgow is a 33 year old female with an Patient's last menstrual period was 03/10/2018. and Estimated Date of Delivery: Dec 15, 2018 [facility] 2018 at 4:38 AM with in active labor.     HPI  ================  Pt presented with contractions increasing in frequency, regularity and intensity over the last several hours. Was 2.5cm in clinic at 10AM yesterday, had a membrane sweep and started to feel more contractions. Observed throughout the day and evening- noted contractions were becoming more regular but that they were tolerable. Desired to labor at home. Took vistaril in the late evening and was able to rest for several hours. Noted stronger contractions upon waking.   SVE in triage 6-7/80/0, mid/soft. Has had some bloody show. Denies leakage of fluid. Reports +fetal movement.    Contractions- strong  Fetal movement- active  ROM- no   Vaginal bleeding- small amt of bloody show  GBS-  on ; previously negative  FOB- is involved,  John  Other labor support- Rocael Delatorre    Weight gain- 162 - 138 lbs, Total weight gain- 24 lbs  Height- 66  BMI- 22.3  First prenatal visit at 8+6 weeks, Total visits- 12    PROBLEM LIST  =================  Patient Active Problem List    Diagnosis Date Noted     Encounter for triage in pregnant patient 2018     Priority: Medium     Labor and delivery indication for care or intervention 2018     Priority: Medium     Thrombocytopenia (H) 2018     Priority: Medium     18: Plts 120, repeat at next visit  11/15/18- plts 119  12/3/18: Plts 108  12/10/18: 114  18- platelets 126       Pt has mouthguard on L and D, please give it to pt for labor 2018     Priority: Medium     Supervision of normal first pregnancy - [facility] CNM 2018     Priority: Medium     18: pap up to date- no hx of abnls    18: Level 2 u/s scheduled for     BPP and CECELIA on ,  NST on  @ 9AM       Migraine without aura and without status migrainosus, not intractable 2018     Priority: Medium     18  Has discussed with dr. Rayo, who says her meds are safe in pregnancy. Pt unsure still...  Given contact info for Dr. Godoy       Adjustment disorder with mixed anxiety and depressed mood 2018     Priority: Medium       HISTORIES  ============  No Known Allergies  Past Medical History:   Diagnosis Date     Migraines     without aura     Past Surgical History:   Procedure Laterality Date     ANKLE SURGERY       COLONOSCOPY      normal result; done due to digestive issues     HERNIA REPAIR     .  Family History   Problem Relation Age of Onset     Lung Cancer Paternal Grandmother         nonsmoker     Breast Cancer Paternal Grandmother         over age 50     Alzheimer Disease Paternal Grandfather      Coronary Artery Disease Early Onset No family hx of      Hypertension No family hx of      Hyperlipidemia No family hx of      Diabetes No family hx of      Social History     Tobacco Use     Smoking status: Never Smoker     Smokeless tobacco: Never Used   Substance Use Topics     Alcohol use: No     Alcohol/week: 1.2 oz     Comment: 3-5 drinks/week     Obstetric History       T0      L0     SAB0   TAB0   Ectopic0   Multiple0   Live Births0       # Outcome Date GA Lbr Allen/2nd Weight Sex Delivery Anes PTL Lv   1 Current                    LABS:   ===========  Prenatal Labs:  Rhogam not indicated   Lab Results   Component Value Date    ABO A 2018    RH Pos 2018    AS Neg 2018    HEPBANG Nonreactive 2018    RUQIGG 33 2018    HGB 13.2 2018     Rubella immune  Lab Results   Component Value Date    GBS Negative 11/15/2018     Other labs:  Results for orders placed or performed in visit on 18 (from the past 24 hour(s))   US OB Fetal Biophys Prf wo NonStrs Singls Sgl    Narrative    33 year old,  , presents  "at 40 6/7 weeks in pregnancy complicated   by post dates for biophysical assessment.     Fetal Breathing Movements (FBM): Normal - 2  Gross Body Movements (GBM): Normal - 2  Fetal Tone (FT): Normal - 2  AFV: Pocket of amniotic fluid > or = to 2 cm x 2 cm - 2  Quantitative Amniotic Fluid Volume Total: 17.1 cm        BPP 8/8.  FHR = 132bpm Normal.   MCA Not done.  NST Not done.      Single fetus in cephalic presentation.  Placenta anterior and grade 1.     Recommend twice weekly assessment.     Jennifer Palacios,VICENTE Thomas MD       ROS  =========  Pt denies significant respiratory, cardiovacular, GI, or muscular/skeletalcomplaints.    See RN data base ROS.     PHYSICAL EXAM:  ===============  /64   Resp 20   Ht 1.676 m (5' 6\")   Wt 73.5 kg (162 lb)   LMP 03/10/2018   BMI 26.15 kg/m    General appearance: uncomfortable with contractions  GENERAL APPEARANCE: healthy, alert and no distress  RESP: lungs clear to auscultation - no rales, rhonchi or wheezes  BREAST: normal without masses, tenderness or nipple discharge and no palpable axillary masses or adenopathy  CV: regular rates and rhythm, normal S1 S2, no S3 or S4 and no murmur,and no varicosities  ABDOMEN:  soft, nontender, no epigastric pain  SKIN: no suspicious lesions or rashes  NEURO: Denies headache, blurred vision, other vision changes  PSYCH: mentation appears normal. and affect normal/bright  Legs: Reflexes normal bilaterally     Abdomen: gravid, vertex fetus per Leopold's, non-tender between contractions.   Cephalic presentation confirmed by BSUS, leopolds and sve  EFW-  7 lbs.   CONTRACTIONS: every 2-4 minutes  FETAL HEART TONES: continuous EFM- baseline 135 with moderate variability and positive accelerations. No decelerations.  PELVIC EXAM: 6-7/80/0, mid/soft   BENSON SCORE: 11  BLOODY SHOW: yes, seen by pt   ROM:no  FLUID: none  ROMPLUS: not done    # Pain Assessment:  Current Pain Score 12/22/2018   Patient currently in pain? yes "   - Sherita is experiencing pain due to contractions. Pain management was discussed with Sherita and her spouse, lenka and the plan was created in a collaborative fashion.  Sherita's response to the current recommendations: engaged  - Interested in unmedicated labor and birth. Aware of options.     ASSESSMENT:  ==============  IUP @ 41w0d admitted in active labor   NST REACTIVE  Fetal Heart Rate - category one  GBS-  - treat with PCN if >18 hours    Gestational thrombocytopenia- last plts - 126    Patient Active Problem List   Diagnosis     Migraine without aura and without status migrainosus, not intractable     Adjustment disorder with mixed anxiety and depressed mood     Supervision of normal first pregnancy - S CNM     Pt has mouthguard on L and D, please give it to pt for labor     Thrombocytopenia (H)     Encounter for triage in pregnant patient     Labor and delivery indication for care or intervention        PLAN:  ===========  Admit - see IP orders.  Admission labs ordered- abo/rh t&s, cbc with plts, anti-trep.  Pt agreeable to IV saline lock.  Pt agreeable to AMTSL with IV pitocin.   Mets criteria for intermittent auscultation at this time. Return to [facility] as indicated by protocol.  Ambulation, hydration, position changes, birthing ball and tub options to facilitate labor reviewed with pt .  Interested in water birth. Consent has been signed, hep C neg. Desires to use sling and birthing ball at this time.   Will prepare bath in room, fill when desired.   GBS - PCN if ROM >18 hours.   MD consultant on call Dr. Quesada/ available prn  Anticipate           BINH Joshi CNM     Fetal Non-Stress Test Results    NST Ordered By: BINH Joshi CNM        NST Start & Stop Times  Start: 0355  Stop: 0415       NST Results  Fetus A   Baseline Rate: 135  Accelerations: Present  Decelerations: None  Interpretation: reactive

## 2018-12-22 NOTE — PLAN OF CARE
of viable male at 1515. NICU present for delivery. VSS. Repair in progress. Report given to VINCENZO Zhang RN.

## 2018-12-22 NOTE — PROVIDER NOTIFICATION
12/22/18 1225   Provider Notification   Provider Name/Title JACOB Ferguson   Method of Notification At Bedside   Request Evaluate in Person   Notification Reason Other (Comment)     Pt with urge to push

## 2018-12-22 NOTE — PROGRESS NOTES
"Labor progress note    S: Pt has been out of tub, in supported hands and knees, ambulating, and now in supported squat/throne.  Consents to SVE and will consider AROM after risk/benefit discussion.   Consents to EFM/TOCO on during/after SVE in case of AROM.  Continues to cope with continuous labor support from FOB and .  Tolerating clears, no emesis.     O:  Blood pressure 122/80, temperature 98.2  F (36.8  C), temperature source Oral, resp. rate 18, height 1.676 m (5' 6\"), weight 73.5 kg (162 lb), last menstrual period 03/10/2018, not currently breastfeeding.  General appearance: uncomfortable with contractions but coping.  Contractions: Every 2-4 minutes. 60-80 seconds duration.  Palpate: moderate.  Soft resting tone.   FHT: Baseline 125 with regular rate. No increases, no decreases with doppler auscultation.  EFM/TOCO reapplied for 16 minutes - 130bpm baseline, moderate variability.  Accelerations are present. no decelerations present.  ROM: AROM for moderate clear fluid at 1044 with pt consent. PELVIC EXAM:8/ 90%/ Anterior/ soft/ -1 with BBOW before AROM      Pitocin- none,  Antibiotics- none  IV saline lock  Pt with spontaneous void for adequate urine just before SVE        # Pain Assessment:    - Sherita is experiencing pain due to labor. Pain management was discussed with Sherita and her significant other and  and the plan was created in a collaborative fashion.  Sherita's response to the current recommendations: engaged  - Continue non pharmacologic options         A:  33 year old  with IUP @ 41w0d active labor and minimal progress   AROM <18 hours, clear, affebrile  Fetal Heart Rate no decreases heard with intermittent auscultation. EFM Tracing category one s/p AROM  GBS-   Gestational thrombocytopenia    Patient Active Problem List   Diagnosis     Migraine without aura and without status migrainosus, not intractable     Adjustment disorder with mixed anxiety and depressed mood "     Supervision of normal first pregnancy - S CNM     Pt has mouthguard on L and D, please give it to pt for labor     Thrombocytopenia (H)     Encounter for triage in pregnant patient     Labor and delivery indication for care or intervention         P:  - Ambulation, hydration, position changes, birthing ball/sling and tub options to facilitate labor.  Encouraged pt to continue to vary positions to encourage fetal rotation/descent.    - Pt declines discussion of pharmacologic options for pain management, coping well with continuous labor support from  and FOB.  CNM offered continuous labor support but pt declined as she feels well supported by her team and would prefer the least amount of people in the room. Will reach out to CNM if that situation changes.   - Encouraged continued PO hydration and light regular diet. If unable to PO hydrate then IV fluid bolus/infusion prn.   - Now that ROM close maternal/fetal surveillance for s/s of infection.  Plan Prophylactic IV antibiotic for  GBS status and gestation >37 weeks only if ROM >18 hours.   - Continue close surveillance of amniotic fluid color, if continues clear can use waterbirth tub if all maternal/fetal surveillance is within protocol.   - Resume intermittent auscultation per protocol since cateogry 1 FHR tracing on EFM s/p AROM.  If decreases heard increase frequency of auscultation by doppler or resume EFM prn.   - Reevaluate in 1-2 hours and prn    Cindy PURCELL CNM

## 2018-12-22 NOTE — PROGRESS NOTES
"Labor progress note    S: Pt has been in water birth tub     O:  Blood pressure 125/78, temperature 98.2  F (36.8  C), temperature source Oral, resp. rate 20, height 1.676 m (5' 6\"), weight 73.5 kg (162 lb), last menstrual period 03/10/2018, SpO2 92 %, not currently breastfeeding.  General appearance: uncomfortable with contractions.  Contractions: Every 2-5 minutes. 60-80 seconds duration.  Palpate: moderate.  Soft resting tone.   FHT: Baseline 120 bpm regular rate, no increases heard.  +decreases heard. EFM applied.  120 BPM baseline with minimal variabilty with periods of moderate.  +acceleartion with scalp stim.  +recurrent variable decelerations present with pushing effort.t   ROM: clear fluid. Membranes have been ruptured for ~3.5 hours.  PELVIC EXAM:10/ 100%/ +1 with small caput to +2      Pitocin- none,  Antibiotics- none  IV saline lock         # Pain Assessment:    - Sherita is experiencing pain due to labor. Pain management was discussed with Sherita and her significant other and  and the plan was created in a collaborative fashion.  Sherita's response to the current recommendations: engaged  - Pt desires to keep pushing.         A:  33 year old  with IUP @ 41w0d second stage labor   ROM<18 hours, afebrile, clear  Fetal Heart Rate Tracing decreases heard with intermittent auscultation, EFM applied. Category 2.    GBS-     Patient Active Problem List   Diagnosis     Migraine without aura and without status migrainosus, not intractable     Adjustment disorder with mixed anxiety and depressed mood     Supervision of normal first pregnancy - S CNM     Pt has mouthguard on L and D, please give it to pt for labor     Thrombocytopenia (H)     Encounter for triage in pregnant patient     Labor and delivery indication for care or intervention     Vaginal delivery         P:  - Began pushing in waterbirth tub at ~1340 but didn't feel like she could connect.  Assisted out of waterbith tub " to bed and EFM applied. Pt able to better connect with pushing effort.    - Continue intrauterine resuscitation efforts for category 2 FHR tracing.  Consult MD and consider operative delivery prn if persistent or worsening category 2 FHR tracing despite interventions remote from delivery.  Consider NICU for delivery prn   - Reevaluate fetal descent in 1 hour of active second stage and  prn       Cindy PURCELL, CNM

## 2018-12-22 NOTE — PLAN OF CARE
Data: Patient presented to [facility] at 0340.   Reason for maternal/fetal assessment per patient is Rule Out Labor  .  Patient is a . Prenatal record reviewed.      Obstetric History       T0      L0     SAB0   TAB0   Ectopic0   Multiple0   Live Births0       # Outcome Date GA Lbr Allen/2nd Weight Sex Delivery Anes PTL Lv   1 Current               . Medical history:   Past Medical History:   Diagnosis Date    Migraines     without aura   . Gestational Age 41w0d. VSS. Fetal movement present. Pt reports regular contractions, bloody show. Patient denies pelvic pressure, UTI symptoms, GI problems, vaginal bleeding, edema, headache, visual disturbances, epigastric or URQ pain, abdominal pain, rupture of membranes. Support persons John  present.  Action: Verbal consent for EFM. Triage assessment completed. EFM applied for fetal tracing. Uterine assessment toco. Fetal assessment: Presumed adequate fetal oxygenation documented (see flow record).   Response: JIE Garcia CNM informed of pt arrival and assessed pt.  6-7 cm.   Plan per provider is admit to labor, plan for intermittent monitoring. Patient verbalized agreement with plan. Patient transferred to room 442 ambulatory, oriented to room and call light.

## 2018-12-22 NOTE — PLAN OF CARE
Pt VSS. Afebrile. Laboring with good support from  and . Desires waterbirth. Currently in waterbirth tub, and coping well with contractions. Continue to monitor per protocol.

## 2018-12-23 LAB
ERYTHROCYTE [DISTWIDTH] IN BLOOD BY AUTOMATED COUNT: 12.5 % (ref 10–15)
HCT VFR BLD AUTO: 30.1 % (ref 35–47)
HGB BLD-MCNC: 10.4 G/DL (ref 11.7–15.7)
MCH RBC QN AUTO: 30.5 PG (ref 26.5–33)
MCHC RBC AUTO-ENTMCNC: 34.6 G/DL (ref 31.5–36.5)
MCV RBC AUTO: 88 FL (ref 78–100)
PLATELET # BLD AUTO: 93 10E9/L (ref 150–450)
RBC # BLD AUTO: 3.41 10E12/L (ref 3.8–5.2)
WBC # BLD AUTO: 14.2 10E9/L (ref 4–11)

## 2018-12-23 PROCEDURE — 25000132 ZZH RX MED GY IP 250 OP 250 PS 637: Performed by: ADVANCED PRACTICE MIDWIFE

## 2018-12-23 PROCEDURE — 85027 COMPLETE CBC AUTOMATED: CPT | Performed by: ADVANCED PRACTICE MIDWIFE

## 2018-12-23 PROCEDURE — 36415 COLL VENOUS BLD VENIPUNCTURE: CPT | Performed by: ADVANCED PRACTICE MIDWIFE

## 2018-12-23 PROCEDURE — 12000030 ZZH R&B OB INTERMEDIATE UMMC

## 2018-12-23 RX ORDER — MULTIVIT WITH MINERALS/LUTEIN
250 TABLET ORAL
Qty: 60 TABLET | Refills: 0 | Status: SHIPPED | OUTPATIENT
Start: 2018-12-23 | End: 2019-07-03

## 2018-12-23 RX ORDER — CYANOCOBALAMIN (VITAMIN B-12) 1000 MCG
1000 TABLET ORAL DAILY
Qty: 60 TABLET | Refills: 1 | Status: SHIPPED | OUTPATIENT
Start: 2018-12-23 | End: 2019-07-03

## 2018-12-23 RX ORDER — IBUPROFEN 800 MG/1
800 TABLET, FILM COATED ORAL EVERY 6 HOURS PRN
Qty: 30 TABLET | Refills: 0 | Status: SHIPPED | OUTPATIENT
Start: 2018-12-23 | End: 2019-07-03

## 2018-12-23 RX ORDER — FERROUS SULFATE 325(65) MG
325 TABLET ORAL
Qty: 60 TABLET | Refills: 0 | Status: SHIPPED | OUTPATIENT
Start: 2018-12-23 | End: 2019-07-03

## 2018-12-23 RX ORDER — AMOXICILLIN 250 MG
1 CAPSULE ORAL 2 TIMES DAILY
Qty: 30 TABLET | Refills: 0 | Status: SHIPPED | OUTPATIENT
Start: 2018-12-23 | End: 2019-07-03

## 2018-12-23 RX ORDER — ACETAMINOPHEN 325 MG/1
650 TABLET ORAL EVERY 4 HOURS PRN
Qty: 30 TABLET | Refills: 0 | Status: SHIPPED | OUTPATIENT
Start: 2018-12-23 | End: 2019-07-03

## 2018-12-23 RX ADMIN — SENNOSIDES AND DOCUSATE SODIUM 2 TABLET: 8.6; 5 TABLET ORAL at 20:27

## 2018-12-23 RX ADMIN — IBUPROFEN 800 MG: 800 TABLET ORAL at 12:26

## 2018-12-23 RX ADMIN — IBUPROFEN 800 MG: 800 TABLET ORAL at 18:34

## 2018-12-23 RX ADMIN — SENNOSIDES AND DOCUSATE SODIUM 2 TABLET: 8.6; 5 TABLET ORAL at 11:21

## 2018-12-23 RX ADMIN — IBUPROFEN 800 MG: 800 TABLET ORAL at 06:15

## 2018-12-23 NOTE — L&D DELIVERY NOTE
Delivery Summary    Sherita Glasgow MRN# [id number]   Age: 33 year old YOB: 1985     ASSESSMENT & PLAN:        Labor Event Times    Labor onset date:  18 Onset time:   3:00 AM   Dilation complete date:  18 Complete time:   1:39 PM   Start pushing date/time:  2018 1340      Labor Length    1st Stage (hrs):  10 (min):  39   2nd Stage (hrs):  1 (min):  36   3rd Stage (hrs):  0 (min):  6      Labor Events     labor?:  No   steroids:  None  Labor Type:  Spontaneous  Predominate monitoring during 1st stage:  intermittent auscultation     Rupture identifier:  Rupture 1  Rupture date/time: 18 1041   Rupture type:  Artificial Rupture of Membranes  Fluid color:  Clear  Fluid odor:  Normal     1:1 continuous labor support provided by?:  STEFANI og       Delivery/Placenta Date and Time    Delivery Date:  18 Delivery Time:   3:15 PM   Placenta Date/Time:  2018  3:21 PM  Oxytocin given at the time of delivery:  after delivery of baby     Vaginal Counts     Initial count performed by 2 team members:   Two Team Members   Hernando Lucas       Needles Suture [place] Sponges Instruments   Initial counts 2  5    Added to count  1 5    Final counts 2 1 5    Placed during labor Accounted for at the end of labor   No NA   No NA   No NA    Final count performed by 2 team members:   Two Team Members   Hernando   MDahlRN      Final count correct?:  Yes     Apgars    Living status:  Living   1 Minute 5 Minute 10 Minute 15 Minute 20 Minute   Skin color: 0  1       Heart rate: 2  2       Reflex irritability: 2  2       Muscle tone: 2  2       Respiratory effort: 2  2       Total: 8  9       Apgars assigned by:  DONATO MENDES     Cord    Vessels:  3 Vessels Complications:  Nuchal   Cord Blood Disposition:  Lab Gases Sent?:  Yes       Resuscitation    [facility] at Delivery:  NNP Delivery Attendance Note:  NICU team was asked by Berenice Ferguson CNM to  "attend the delivery of this term, male infant with a gestational age of 41 0/7 weeks secondary to decels. He delivered by  at 3:15 PM.   He had spontaneous respirations and good tone at birth. He was placed on his mother and was dried and stimulated. Clamping of the umbilical cord occurred at about 4 minutes of life. He continued to have good tone and respiratory effort, color quickly became pink. Apgar scores were 8 and 9 at 1 and 5 minutes of life. Brief exam is WNL. He was shown to mother and father and will be transferred to the [facility] for further care. NICU team departed room after 5 minutes of life.  BINH English, NNP-BC     2018, 3:39 PM      Measurements    Weight:  8 lb 15.2 oz Length:  1' 9\"   Head circumference:  35.6 cm       Skin to Skin and Feeding Plan    Skin to skin initiation date/time: 1841    Skin to skin with:  Mother  Skin to skin end date/time:     Breastfeeding initiated date/time:  2018 1600  How do you plan to feed your baby:  Breastfeeding     Labor Events and Shoulder Dystocia    Fetal Tracing Prior to Delivery:  Category 2  Fetal Tracing Comments:  +Scalp Stim.   Shoulder dystocia present?:  Neg     Delivery (Maternal) (Provider to Complete) (173254)    Episiotomy:  None  Perineal lacerations:  2nd Repaired?:  Yes   Vaginal laceration?:  No    Cervical laceration?:  No       Blood Loss  Mother: Sherita Glasgow #[id number]   Start of Mother's Information    IO Blood Loss  18 0300 - 18 1856    Total QBL Blood Loss (mL) Hospital Encounter 361 mL    Total  361 mL         End of Mother's Information  Mother: Sherita Glasgow #[id number]         Delivery - Provider to Complete ([id number])    Delivering clinician:  Cindy Ferguson APRN CNM  CNM Care:  Exclusive CNM care in labor  Attempted Delivery Types (Choose all that apply):  Spontaneous Vaginal Delivery  Delivery Type (Choose the 1 that will go to the Birth History):  Vaginal, " Spontaneous          Placenta    Delayed Cord Clamping:  Done  Date/Time:  12/22/2018  3:21 PM  Removal:  Spontaneous  Comments:  Everette.   Disposition:  Hospital disposal     Anesthesia    Method:  None          Presentation and Position    Presentation:  Compound  Position:  Right Occiput Anterior           BINH Bocanegra CNM

## 2018-12-23 NOTE — PLAN OF CARE
Patient transferred up to [facility] with baby and . Voided in L&D. Oriented to room and unit. ID Bands checked with second RN.

## 2018-12-23 NOTE — PROGRESS NOTES
"Post Partum Note    Sherita Glasgow  MRN# [id number]    Age: 33 year old  YOB: 1985      SIGNIFICANT PROBLEMS:  Patient Active Problem List   Diagnosis     Migraine without aura and without status migrainosus, not intractable     Adjustment disorder with mixed anxiety and depressed mood     Supervision of normal first pregnancy - [facility] CNM     Pt has mouthguard on L and D, please give it to pt for labor     Thrombocytopenia (H)     Encounter for triage in pregnant patient     Labor and delivery indication for care or intervention     Vaginal delivery        INTERVAL HISTORY:  /74   Pulse 73   Temp 98.3  F (36.8  C) (Oral)   Resp 16   Ht 1.676 m (5' 6\")   Wt 73.5 kg (162 lb)   LMP 03/10/2018   SpO2 97%   Breastfeeding? Unknown   BMI 26.15 kg/m     Pt stable, baby is rooming in. Baby boy \"Siddharth,\" doing well.   Breast feeding status:initiated; working with RN for support  Complications since 2 hours post delivery: None  Patient is tolerating activity well, Voiding without difficulty, cramping is minimal and is relieved by Ibuprofen, lochia is decreasing and patient denies clots.  Perineal pain is is minimal and is relieved by Ibuprofen.  The perineum laceration is well approximated    Postpartum hemoglobin   Recent Labs   Lab 12/23/18  0643 12/22/18  0455 12/17/18  1045   HGB 10.4* 13.8 13.2      Blood type   Lab Results   Component Value Date    ABO A 12/22/2018    RH Pos 12/22/2018      Rubella status: Immune    History of depression: adjustment disorder with anxiety and depression. Postpartum depression warning signs reviewed. Plan 2 week mood check.     ASSESSMENT/PLAN:  Normal postpartum exam , Stable Post-partum day #1  Complications:anemic, plan for iron supplementation  Home Visit Ordered- Yes    Postpartum warning s/s reviewed, including bleeding/clots, fever, mastitis, or depression, Kegels/ crunches.    Reviewed hemoglobin of 10.4. Recommended iron supplementation. Prescription " sent for po iron, vitamin c and b12. Reviewed use.  Also recommended increasing iron rich foods.    Continue prenatal vitamins  Discharge medications ordered- tylenol, motrin, stool softener, iron, vit c, b12    Birthcontrol planned:Condoms    Plan d/c home tomorrow.   RTC 2 weeks for mood check, 6 weeks for routine PP visit       Review of your medicines      UNREVIEWED medicines. Ask your doctor about these medicines      Dose / Directions   OMEGA 3 500 PO      Refills:  0        START taking      Dose / Directions   acetaminophen 325 MG tablet  Commonly known as:  TYLENOL      Dose:  650 mg  Take 2 tablets (650 mg) by mouth every 4 hours as needed for mild pain or fever  Quantity:  30 tablet  Refills:  0     B-12 1000 MCG Tabs  Used for:  Anemia, unspecified type      Dose:  1000 mcg  Take 1,000 mcg by mouth daily  Quantity:  60 tablet  Refills:  1     ferrous sulfate 325 (65 Fe) MG tablet  Commonly known as:  FEROSUL  Used for:  Anemia, unspecified type      Dose:  325 mg  Take 1 tablet (325 mg) by mouth daily (with breakfast)  Quantity:  60 tablet  Refills:  0     ibuprofen 800 MG tablet  Commonly known as:  ADVIL/MOTRIN      Dose:  800 mg  Take 1 tablet (800 mg) by mouth every 6 hours as needed for other (cramping)  Quantity:  30 tablet  Refills:  0     senna-docusate 8.6-50 MG tablet  Commonly known as:  SENOKOT-S/PERICOLACE      Dose:  1 tablet  Take 1 tablet by mouth 2 times daily  Quantity:  30 tablet  Refills:  0     vitamin C 250 MG tablet  Commonly known as:  ASCORBIC ACID  Used for:  Anemia, unspecified type      Dose:  250 mg  Take 1 tablet (250 mg) by mouth daily (with breakfast)  Quantity:  60 tablet  Refills:  0        CONTINUE these medicines which have NOT CHANGED      Dose / Directions   prenatal multivitamin w/iron 27-0.8 MG tablet      Dose:  1 tablet  Take 1 tablet by mouth daily  Refills:  0     rizatriptan 10 MG tablet  Commonly known as:  MAXALT  Used for:  Migraine without aura and  without status migrainosus, not intractable      Dose:  10 mg  Take 1 tablet (10 mg) by mouth as needed for migraine  Quantity:  36 tablet  Refills:  3     VITAMIN D (CHOLECALCIFEROL) PO      Dose:  2000 Units  Take 2,000 Units by mouth daily  Refills:  0        STOP taking    hydrOXYzine 25 MG tablet  Commonly known as:  ATARAX              Where to get your medicines      These medications were sent to [facility] [place], [place] - [address]  [address] [address]    Phone:  [phone]     acetaminophen 325 MG tablet    B-12 1000 MCG Tabs    ferrous sulfate 325 (65 Fe) MG tablet    ibuprofen 800 MG tablet    senna-docusate 8.6-50 MG tablet    vitamin C 250 MG tablet        BINH Joshi, VLADISLAV

## 2018-12-23 NOTE — PLAN OF CARE
Data: Vital signs within normal limits. Postpartum checks within normal limits - see flow record. Patient eating and drinking normally. Patient able to empty bladder independently and is up ambulating. Perineum swelling is improving with ice and tucks pads, encouraged to take a few sitz bath soaks today. Patient performing self cares and is able to care for infant.  Action: Patient medicated during the shift for perineum soreness and uterine cramping - hot packs used for relief.   Response: Positive attachment behaviors observed with infant. John, , present and supportive.   Will continue to monitor and provide support.

## 2018-12-23 NOTE — PLAN OF CARE
VSS. Pain well controlled with Ibuprofen. Pt breastfeeding baby on cue, working towards independence with positioning and latch. Postpartum checks WNL. Perineal swelling treated with ice and tucks pads. Pt able to soak in bathtub this morning. Pt and spouse, John, bonding well with baby.

## 2018-12-23 NOTE — PLAN OF CARE
Data: Vital signs within normal limits. Postpartum checks within normal limits - see flow record. Patient eating and drinking normally. Patient able to empty bladder independently and is up ambulating. Patient performing self cares and is able to care for infant. Breastfeeding with assistance in positioning and latching baby. Assisted patient with finding positions that help her to support baby at the breast and support her breast during feeding.   Action: Patient denies pain and declines pain medication. Patient using ice packs and tucks for perineum, perineum is swollen.   Response: Positive attachment behaviors observed with infant. Support person, : katharine Lopez.   Plan: Continue with the plan of care.

## 2018-12-24 VITALS
HEART RATE: 72 BPM | BODY MASS INDEX: 26.03 KG/M2 | TEMPERATURE: 98.3 F | SYSTOLIC BLOOD PRESSURE: 119 MMHG | DIASTOLIC BLOOD PRESSURE: 84 MMHG | HEIGHT: 66 IN | RESPIRATION RATE: 16 BRPM | WEIGHT: 162 LBS | OXYGEN SATURATION: 97 %

## 2018-12-24 PROCEDURE — 25000132 ZZH RX MED GY IP 250 OP 250 PS 637: Performed by: ADVANCED PRACTICE MIDWIFE

## 2018-12-24 RX ADMIN — IBUPROFEN 800 MG: 800 TABLET ORAL at 00:15

## 2018-12-24 RX ADMIN — IBUPROFEN 800 MG: 800 TABLET ORAL at 08:09

## 2018-12-24 RX ADMIN — SENNOSIDES AND DOCUSATE SODIUM 2 TABLET: 8.6; 5 TABLET ORAL at 08:09

## 2018-12-24 NOTE — DISCHARGE SUMMARY
[facility] Discharge Summary    Sherita Glasgow MRN# [id number]   Age: 33 year old YOB: 1985     Date of Admission:  12/22/2018  Date of Discharge::  12/24/2018  Admitting Physician:  BINH Perez CNM  Discharge Physician:  Cindy Ferguson CNM, MS      Home clinic: [facility]          Admission Diagnoses:   Maternity(EDD12/15/18)Labor  Encounter for triage in pregnant patient  Labor and delivery indication for care or intervention  Vaginal delivery          Discharge Diagnosis:   Normal spontaneous vaginal delivery  Intrauterine pregnancy at 41 weeks gestation          Procedures:   Procedure(s): Repair of second degree perineal laceration       No other significant procedures performed during this admission           Medications Prior to Admission:     Medications Prior to Admission   Medication Sig Dispense Refill Last Dose     Prenatal Vit-Fe Fumarate-FA (PRENATAL MULTIVITAMIN PLUS IRON) 27-0.8 MG TABS per tablet Take 1 tablet by mouth daily   Past Week at Unknown time     Omega-3 Fatty Acids (OMEGA 3 500 PO)    Taking     rizatriptan (MAXALT) 10 MG tablet Take 1 tablet (10 mg) by mouth as needed for migraine 36 tablet 3 Taking     VITAMIN D, CHOLECALCIFEROL, PO Take 2,000 Units by mouth daily   Taking     [DISCONTINUED] hydrOXYzine (ATARAX) 25 MG tablet Take 2 tablets (50 mg) by mouth once for 1 dose 2 tablet 1              Discharge Medications:     Current Discharge Medication List      START taking these medications    Details   acetaminophen (TYLENOL) 325 MG tablet Take 2 tablets (650 mg) by mouth every 4 hours as needed for mild pain or fever  Qty: 30 tablet, Refills: 0    Associated Diagnoses: Vaginal delivery      Cyanocobalamin (B-12) 1000 MCG TABS Take 1,000 mcg by mouth daily  Qty: 60 tablet, Refills: 1    Associated Diagnoses: Anemia, unspecified type; Vaginal delivery      ferrous sulfate (FEROSUL) 325 (65 Fe) MG tablet Take 1 tablet (325 mg)  by mouth daily (with breakfast)  Qty: 60 tablet, Refills: 0    Associated Diagnoses: Anemia, unspecified type; Vaginal delivery      ibuprofen (ADVIL/MOTRIN) 800 MG tablet Take 1 tablet (800 mg) by mouth every 6 hours as needed for other (cramping)  Qty: 30 tablet, Refills: 0    Associated Diagnoses: Vaginal delivery      senna-docusate (SENOKOT-S/PERICOLACE) 8.6-50 MG tablet Take 1 tablet by mouth 2 times daily  Qty: 30 tablet, Refills: 0    Associated Diagnoses: Vaginal delivery      vitamin C (ASCORBIC ACID) 250 MG tablet Take 1 tablet (250 mg) by mouth daily (with breakfast)  Qty: 60 tablet, Refills: 0    Associated Diagnoses: Anemia, unspecified type; Vaginal delivery         CONTINUE these medications which have NOT CHANGED    Details   Prenatal Vit-Fe Fumarate-FA (PRENATAL MULTIVITAMIN PLUS IRON) 27-0.8 MG TABS per tablet Take 1 tablet by mouth daily      Omega-3 Fatty Acids (OMEGA 3 500 PO)       rizatriptan (MAXALT) 10 MG tablet Take 1 tablet (10 mg) by mouth as needed for migraine  Qty: 36 tablet, Refills: 3    Associated Diagnoses: Migraine without aura and without status migrainosus, not intractable      VITAMIN D, CHOLECALCIFEROL, PO Take 2,000 Units by mouth daily         STOP taking these medications       hydrOXYzine (ATARAX) 25 MG tablet Comments:   Reason for Stopping:                     Consultations:   Consultation during this admission received from lactation and MD for repair.           Brief History of Labor:   Sherita Glasgow is a 33 year old   at  41w0d presented to labor floor with c/o labor, admit SVE 6-7/80/0 at 0445.  Pt spent time in waterbirth tub and had increase rectal pressure, follow up SVE at 0838 showed 7-8/80/-1, pt declined AROM, decided to get out of tub for ambulation/hands and knees.  Rechecked SVE at 1046 with 8/80/-1 with BBOW, pt consented to AROM for moderate clear fluid.  Used nipple stimulation, position changes, hydrotherapy and had SVE noted 8.5 (with more  cervix to maternal R, almost alip on maternal L) at 1230 was sifted and then assisted back into water birth tub.  Had increase in rectal pressure and was found to be C/C/+1 with caput to +2 at 1440. Pushed effectively with CNM coaching but didn't feel like she could connect in the waterbirth tub so was assisted out to bed. FHR with variable decels with pushing effort with IA so EFM replaced and periods of minimal variability noted.  Continuous EFM remained in place, IV fluid bolus, 02@10L.  Pt pushed effectively with CNM coaching and NICU called to delivery d/t category 2 FHR tracing.  Head delivered OA and restituted to ROT.  Nuchal cord x1 . Shoulders delivered under maternal effort after reduction of right nuchal hand.  Live male  delivered at 1315 over a perineal laceration under no anesthesia.  Spontaneous breath, vigorous cry, well flexed, HR>100. Infant directly to maternal abdomen, skin to skin.  Assessed by NICU team on maternal abdomen, allowed to remain with pt.  Delayed cord clamping for 5 minutes then clamped x2 and cut by FOB.  IV pitocin infusing after baby.  Cord blood obtained for typing.  Cord segment for gases.  Intact placenta spontaneously delivered via [place] at 1521.   3 vessel cord. Fundus firm @ u after massage.   Vagina, and rectum inspected, questionable frayed capsule noted.  Alexander SIMS in to confirm no 3rd degree laceration and capsule not requiring reinforcement. .  2nd degree perineal laceration repaired with 3-0 vicryl suture on a CT in the usual fashion after local infiltration with 1% lidoacine.  Hemostasis noted.    Pt tolerated well. Mother and infant stable, continued skin to skin.     Apgars 8/9.  Weight pending QBL 361ml     Cord gases WNL.            Delivery Note  IUP at 41 weeks gestation delivered on 2018.     delivery of a viable Male infant.  Weight : pending  Apgars of 8 at 1 minute and 9 at 5 minutes.  Labor was augmented with AROM  Medications  "administered  in labor:  Pain Rx none; Antibiotics No; Other None  Perineum: 2nd degree  Placenta-mechanism: spontaneous, intact,  with a 3 vessel cord. IV oxytocin was given.  Quantitative Blood Loss was 361ml  Complications of pregnancy, labor and delivery: Dysfunctional labor and Repetative variables (60x60)  Birth attendants  Cindy PURCELL, SHANTAM             Assessment Day of Discharge    Vital signs:  Temp: 98.3  F (36.8  C) Temp src: Oral BP: 119/84 Pulse: 72 Heart Rate: 77 Resp: 16       Height: 167.6 cm (5' 6\") Weight: 73.5 kg (162 lb)  Estimated body mass index is 26.15 kg/m  as calculated from the following:    Height as of this encounter: 1.676 m (5' 6\").    Weight as of this encounter: 73.5 kg (162 lb).      Breasts: Soft, filling  Nipples: Intact, Non-tender  Abdomen: Soft, Non-tender    Uterus: Fundus Firm, Non-tender, located 2cm below the umbilicus   Lochia: Rubra, appropriate amount    Perineum:  Well-approximated, healing well  Lower Extremities: trace Edema Bilateral, Negative Charity's Sign           Hospital Course:   The patient's hospital course was unremarkable.  On discharge, her pain was well controlled. Vaginal bleeding is similar to peak menstrual flow.  Voiding without difficulty.  Ambulating well and tolerating a normal diet.  No fever.  Breastfeeding is well established, starting to feel filing, +colostrum.  Infant is stable.  No bowel movement yet but +flatus and taking stool softeners. Mood stable, has good family supports identified.   She was discharged on post-partum day #2. Will have her  come for postpartum home visit in the new few days.     Post-partum hemoglobin:   Hemoglobin   Date Value Ref Range Status   2018 10.4 (L) 11.7 - 15.7 g/dL Final        Rh:positive, Rhogam given N/A  Rubella status:Immune  Plan for contraception: Condoms after abstinence until 6 week check   Reviewed Chapter One of  [facility]  Family Book including warning signs of postpartum, " activity level, avoiding IC for 6 weeks, Tub soaks BID, Kegels, abdominal exercises, breast care,  postpartum depression/anxiety. - Reviewed how to establish/maintain milk supply, nipple care, pumping for storage, fore/hind milk, wet/dirty diapers to expect each day, resources prn if questions or concerns.  Pt verbalized understanding with teach back.          Discharge Instructions and Follow-Up:   Discharge diet: Regular, Iron Rich, High Fiber, Minimum 80oz water daily   Discharge activity: Pelvic rest: abstain from intercourse and do not use tampons for 6 week(s)   Discharge follow-up: Follow up with CNM in 2 weeks for mood and breastfeeding support  Follow up with CNM in 6-8 weeks for PP exam   Wound care: Drink plenty of fluids  Ice to area for comfort   Keep wound clean and dry   Avoid constipation   Sitz bath TID            Discharge Disposition:   Discharged to home        BINH Bocanegra CNM

## 2018-12-24 NOTE — PLAN OF CARE
Data: Vital signs within normal limits. Postpartum checks within normal limits - see flow record. Patient eating and drinking normally. Patient able to empty bladder independently and is up ambulating. Patient performing self cares and is able to care for infant. Breastfeeding on demand, working on a deeper latch.   Action: Pain has been adequately managed with oral pain medication.   Response: Positive attachment behaviors observed with infant. Support person, spouse: John, present.   Plan: Continue with the plan of care.

## 2018-12-24 NOTE — PLAN OF CARE
Data: Vital signs within normal limits. Postpartum checks within normal limits - see flow record. Patient eating and drinking normally. Patient able to empty bladder independently and is up ambulating. Patient performing self cares and is able to care for infant.  Action: Patient medicated during the shift for perineum soreness, ice packs, tucks, and sitz bath used to provide relief.   Response: Positive attachment behaviors observed with infant.   John, , present and supportive.   Will continue to monitor and provide support.

## 2018-12-24 NOTE — PLAN OF CARE
Reviewed discharge education, instructions, medications and follow up recommendations with patient and spouse.    Sherita has a breast pump to use at home.    Pt discharged to home with infant.

## 2018-12-24 NOTE — PLAN OF CARE
Pt discharged to home with baby. Instructions & prescriptions given & reviewed. ID bands double checked. Pt verbalized understanding her plan & had no further questions. Instructed to Follow up at clinic with 2 week for mood check & 6-8 weeks for PP check.

## 2018-12-27 ENCOUNTER — TELEPHONE (OUTPATIENT)
Dept: OBGYN | Facility: CLINIC | Age: 33
End: 2018-12-27

## 2018-12-27 NOTE — TELEPHONE ENCOUNTER
[facility] paperwork michael keating signed faxed emailed copied today.

## 2019-01-11 ENCOUNTER — OFFICE VISIT (OUTPATIENT)
Dept: OBGYN | Facility: CLINIC | Age: 34
End: 2019-01-11
Attending: ADVANCED PRACTICE MIDWIFE
Payer: COMMERCIAL

## 2019-01-11 VITALS
HEART RATE: 90 BPM | DIASTOLIC BLOOD PRESSURE: 80 MMHG | BODY MASS INDEX: 23.37 KG/M2 | WEIGHT: 144.8 LBS | SYSTOLIC BLOOD PRESSURE: 114 MMHG

## 2019-01-11 DIAGNOSIS — G43.809 OTHER MIGRAINE WITHOUT STATUS MIGRAINOSUS, NOT INTRACTABLE: ICD-10-CM

## 2019-01-11 PROCEDURE — G0463 HOSPITAL OUTPT CLINIC VISIT: HCPCS | Mod: ZF

## 2019-01-11 RX ORDER — DOCUSATE SODIUM 100 MG/1
100 CAPSULE, LIQUID FILLED ORAL 2 TIMES DAILY PRN
Qty: 100 CAPSULE | Refills: 3 | Status: SHIPPED | OUTPATIENT
Start: 2019-01-11 | End: 2019-07-03

## 2019-01-11 RX ORDER — RIZATRIPTAN BENZOATE 10 MG/1
10 TABLET ORAL
Qty: 60 TABLET | Refills: 3 | Status: SHIPPED | OUTPATIENT
Start: 2019-01-11 | End: 2019-06-15

## 2019-01-11 ASSESSMENT — ANXIETY QUESTIONNAIRES
3. WORRYING TOO MUCH ABOUT DIFFERENT THINGS: NEARLY EVERY DAY
7. FEELING AFRAID AS IF SOMETHING AWFUL MIGHT HAPPEN: MORE THAN HALF THE DAYS
GAD7 TOTAL SCORE: 16
5. BEING SO RESTLESS THAT IT IS HARD TO SIT STILL: SEVERAL DAYS
1. FEELING NERVOUS, ANXIOUS, OR ON EDGE: NEARLY EVERY DAY
6. BECOMING EASILY ANNOYED OR IRRITABLE: MORE THAN HALF THE DAYS
2. NOT BEING ABLE TO STOP OR CONTROL WORRYING: MORE THAN HALF THE DAYS

## 2019-01-11 ASSESSMENT — PATIENT HEALTH QUESTIONNAIRE - PHQ9
5. POOR APPETITE OR OVEREATING: NEARLY EVERY DAY
SUM OF ALL RESPONSES TO PHQ QUESTIONS 1-9: 7

## 2019-01-11 ASSESSMENT — PAIN SCALES - GENERAL: PAINLEVEL: NO PAIN (0)

## 2019-01-11 NOTE — PROGRESS NOTES
SUBJECTIVE  33 year old  presents for 2 week post partum visit s/p  delivery on 18 for breastfeeding and mood check.  Pt is doing well overall. Breastfeeding with good latch to both breasts. No nipple pain.- weight check ~7oz above birth weight just before 2 weeks   - took first two weeks slow physically.    - up average 3x/night for feeds ~45-60min.  Waking up with anxiety dreams - has had the previously with big life events.  Been in therapy   past year. doesn't nap as much during the day - trouble resting when she's the only one with baby.  Open to considering medication as her anxiety is much more heightened at this time.    OBJECTIVE  General- no apparent distress  PSYCH - able to discuss needs and fears without issue.  Good judgement, rationale thought.   Breast- Deferred as pt is not having any issues   Extremities- no edema  /80 (BP Location: Left arm, Patient Position: Sitting, Cuff Size: Adult Regular)   Pulse 90   Wt 65.7 kg (144 lb 12.8 oz)   LMP 03/10/2018   BMI 23.37 kg/m        ASSESSMENT/PLAN  (Z39.2) Routine postpartum follow-up  (primary encounter diagnosis)    (Z39.1) Postpartum care and examination of lactating mother    (G43.809) Other migraine without status migrainosus, not intractable  Plan: rizatriptan (MAXALT) 10 MG tablet      (O70.9) Perineal laceration complicating delivery  Plan: docusate sodium (COLACE) 100 MG capsule        2 weeks postpartum s/p vaginal delivery  - + signs or symptoms of PPD anxiety - agreeable to conitnue her own therapy but also consider  psych services. Open to considering meds but doesn't want to start today.  Open ot message with options and discussion with Perri SIMS.  Agreeable to follow up in 2 weeks and prn. .    - RTO in2 weeks for mood check and prn if questions or concerns    Cindy PURCELL CNM

## 2019-01-11 NOTE — NURSING NOTE
Chief Complaint   Patient presents with     Follow Up     2 weeks mood check     Vaginal Bleeding     Refill Request     Maxalt      Health Maintenance Due   Topic Date Due     DEPRESSION ACTION PLAN  09/06/2005     Brenna Snell CMA on 1/11/2019 at 1:06 PM

## 2019-01-11 NOTE — LETTER
2019       RE: Sherita Glasgow  [address]  [facility] [address]     Dear Colleague,    Thank you for referring your patient, Sherita Glasgow, to the [facility] at [facility]. Please see a copy of my visit note below.    SUBJECTIVE  33 year old  presents for 2 week post partum visit s/p  delivery on 18 for breastfeeding and mood check.  Pt is doing well overall. Breastfeeding with good latch to both breasts. No nipple pain.- weight check ~7oz above birth weight just before 2 weeks   - took first two weeks slow physically.    - up average 3x/night for feeds ~45-60min.  Waking up with anxiety dreams - has had the previously with big life events.  Been in therapy   past year. doesn't nap as much during the day - trouble resting when she's the only one with baby.  Open to considering medication as her anxiety is much more heightened at this time.    OBJECTIVE  General- no apparent distress  PSYCH - able to discuss needs and fears without issue.  Good judgement, rationale thought.   Breast- Deferred as pt is not having any issues   Extremities- no edema  /80 (BP Location: Left arm, Patient Position: Sitting, Cuff Size: Adult Regular)   Pulse 90   Wt 65.7 kg (144 lb 12.8 oz)   LMP 03/10/2018   BMI 23.37 kg/m         ASSESSMENT/PLAN  (Z39.2) Routine postpartum follow-up  (primary encounter diagnosis)    (Z39.1) Postpartum care and examination of lactating mother    (G43.809) Other migraine without status migrainosus, not intractable  Plan: rizatriptan (MAXALT) 10 MG tablet    (O70.9) Perineal laceration complicating delivery  Plan: docusate sodium (COLACE) 100 MG capsule    2 weeks postpartum s/p vaginal delivery  - + signs or symptoms of PPD anxiety - agreeable to conitnue her own therapy but also consider  psych services. Open to considering meds but doesn't want to start today.  Open ot message with  options and discussion with Perri SIMS.  Agreeable to follow up in 2 weeks and prn. .    - RTO in2 weeks for mood check and prn if questions or concerns    Cindy PURCELL, SHANTAM

## 2019-01-12 ASSESSMENT — ANXIETY QUESTIONNAIRES: GAD7 TOTAL SCORE: 16

## 2019-01-22 ENCOUNTER — TELEPHONE (OUTPATIENT)
Dept: PHARMACY | Facility: CLINIC | Age: 34
End: 2019-01-22

## 2019-01-22 DIAGNOSIS — F43.23 ADJUSTMENT DISORDER WITH MIXED ANXIETY AND DEPRESSED MOOD: Primary | ICD-10-CM

## 2019-01-22 RX ORDER — CITALOPRAM HYDROBROMIDE 10 MG/1
10 TABLET ORAL DAILY
Qty: 30 TABLET | Refills: 3 | Status: SHIPPED | OUTPATIENT
Start: 2019-01-22 | End: 2019-07-03

## 2019-01-22 NOTE — TELEPHONE ENCOUNTER
Called patient at request of Cindy Ferguson to discuss anxiety.      Patient reports she has never taken antidepressants or antianxiety medications in the past;  Has had anxiety for years;  Worse int he past year.  Reports trouble with decision-making; little things -- impacting sleep and increased irritability, feeling more overwhelmed; has been in therapy in past year which has been helpful, but not quite enough.  Has considered acupuncture;  Considering citalopram, but worried about side effects (headaches, fatigue, low libido)    Educated patient on benefits and potential side effects with citalopram.  Patient agreed to start treatment.  Prescription sent per Cindy Ferguson CNM.      Recommended patient f/u with Cindy in 3-4 works to evaluate efficacy of the citalopram.  Patient agreed and voiced understanding of plan.      Brittani Rayo, Pharm.D., BCPS

## 2019-01-30 ENCOUNTER — TELEPHONE (OUTPATIENT)
Dept: OBGYN | Facility: CLINIC | Age: 34
End: 2019-01-30

## 2019-01-30 ENCOUNTER — OFFICE VISIT (OUTPATIENT)
Dept: OBGYN | Facility: CLINIC | Age: 34
End: 2019-01-30
Attending: ADVANCED PRACTICE MIDWIFE
Payer: COMMERCIAL

## 2019-01-30 VITALS
DIASTOLIC BLOOD PRESSURE: 75 MMHG | BODY MASS INDEX: 23.9 KG/M2 | WEIGHT: 148.7 LBS | HEIGHT: 66 IN | HEART RATE: 85 BPM | SYSTOLIC BLOOD PRESSURE: 111 MMHG

## 2019-01-30 DIAGNOSIS — L92.9 GRANULATION TISSUE: ICD-10-CM

## 2019-01-30 PROBLEM — Z00.6 RESEARCH STUDY PATIENT: Status: ACTIVE | Noted: 2018-06-05

## 2019-01-30 PROBLEM — D69.6 THROMBOCYTOPENIA (H): Status: RESOLVED | Noted: 2018-09-25 | Resolved: 2019-01-30

## 2019-01-30 PROBLEM — Z34.00 SUPERVISION OF NORMAL FIRST PREGNANCY: Status: RESOLVED | Noted: 2018-05-11 | Resolved: 2019-01-30

## 2019-01-30 PROBLEM — Z36.89 ENCOUNTER FOR TRIAGE IN PREGNANT PATIENT: Status: RESOLVED | Noted: 2018-12-22 | Resolved: 2019-01-30

## 2019-01-30 LAB
ERYTHROCYTE [DISTWIDTH] IN BLOOD BY AUTOMATED COUNT: 11.8 % (ref 10–15)
HCT VFR BLD AUTO: 39.7 % (ref 35–47)
HGB BLD-MCNC: 13.1 G/DL (ref 11.7–15.7)
MCH RBC QN AUTO: 29.5 PG (ref 26.5–33)
MCHC RBC AUTO-ENTMCNC: 33 G/DL (ref 31.5–36.5)
MCV RBC AUTO: 89 FL (ref 78–100)
PLATELET # BLD AUTO: 205 10E9/L (ref 150–450)
RBC # BLD AUTO: 4.44 10E12/L (ref 3.8–5.2)
WBC # BLD AUTO: 5 10E9/L (ref 4–11)

## 2019-01-30 PROCEDURE — 85027 COMPLETE CBC AUTOMATED: CPT | Performed by: ADVANCED PRACTICE MIDWIFE

## 2019-01-30 PROCEDURE — 17250 CHEM CAUT OF GRANLTJ TISSUE: CPT

## 2019-01-30 PROCEDURE — G0463 HOSPITAL OUTPT CLINIC VISIT: HCPCS | Mod: 25

## 2019-01-30 PROCEDURE — 36415 COLL VENOUS BLD VENIPUNCTURE: CPT | Performed by: ADVANCED PRACTICE MIDWIFE

## 2019-01-30 ASSESSMENT — ANXIETY QUESTIONNAIRES
1. FEELING NERVOUS, ANXIOUS, OR ON EDGE: MORE THAN HALF THE DAYS
6. BECOMING EASILY ANNOYED OR IRRITABLE: NEARLY EVERY DAY
7. FEELING AFRAID AS IF SOMETHING AWFUL MIGHT HAPPEN: SEVERAL DAYS
3. WORRYING TOO MUCH ABOUT DIFFERENT THINGS: NEARLY EVERY DAY
2. NOT BEING ABLE TO STOP OR CONTROL WORRYING: NEARLY EVERY DAY
GAD7 TOTAL SCORE: 15
5. BEING SO RESTLESS THAT IT IS HARD TO SIT STILL: NOT AT ALL

## 2019-01-30 ASSESSMENT — PATIENT HEALTH QUESTIONNAIRE - PHQ9
5. POOR APPETITE OR OVEREATING: NEARLY EVERY DAY
SUM OF ALL RESPONSES TO PHQ QUESTIONS 1-9: 6

## 2019-01-30 ASSESSMENT — MIFFLIN-ST. JEOR: SCORE: 1406.25

## 2019-01-30 NOTE — LETTER
RE: Sherita Glasgow  [address]  [facility] [address]     Dear Colleague,    Thank you for referring your patient, Sherita Glasgow, to the [facility] at [facility]. Please see a copy of my visit note below.    Nursing Notes:   Janel Jane CMA  2019  2:40 PM  Signed  SUBJECTIVE:   Sherita Glasgow is here for her 6-week postpartum checkup.     PHQ-9 score: 6  Hx of Abuse:  No    Delivery Date: 2018.    Delivering provider:  Cindy Ferguson CNM.    Type of delivery:  .  Perineum:  tear, with repair.     Delivery complications: None  Infant gender:  boy, weight 8lb pounds 15.2 oz.  Feeding Method:  .  Complications reported with feeding:  none, infant thriving .    Bleeding:  Light.  Duration:  6weeks.  Menses resumed:  No  Bowel/Urinary problems:  No    Contraception Planned:  Would like to discuss  She  has not had intercourse since delivery..    ================================================================  Pt reports some challenges during postpartum period, including postpartum anxiety and perineal discomfort.  Pt was seen for 2 week mood check and was prescribed Celexa.  Has not started medication d/t concerns about possible effects on milk supply. Pt also reports began to experience increased perineal discomfort ~1 week ago, especially with urination or irritation to perineal area. Bleeding has mostly stopped, occasionally has some light spotting. Denies any urination issues. Denies any constipation or hemorrhoids. Has been continuing to take stool softener. Breastfeeding without issue, denies pain. Has not had intercourse. Considering condoms for contraception.  Thrombocytopenic during pregnancy, platelets 94 after birth.  ================================================================  ROS: 10 point ROS neg other than the symptoms noted above in the HPI.   CONSTITUTIONAL: negative  RESPIRATORY:  "negative  CARDIOVASCULAR: negative  GI: negative  : negative  SKIN: negative  PSYCHIATRIC: anxiety    EXAM:  /75 (BP Location: Left arm, Patient Position: Chair)   Pulse 85   Ht 1.676 m (5' 6\")   Wt 67.4 kg (148 lb 11.2 oz)   LMP 03/10/2018   BMI 24.00 kg/m        PHQ-9 SCORE 2018   PHQ-9 Total Score MyChart - - -   PHQ-9 Total Score 8 7 6     VESNA-7 SCORE 2018   Total Score - - -   Total Score 10 16 15     General: healthy, alert and no distress  Psych: anxiety  Breasts:  Lactating  Abdomen: Benign, Soft, flat, non-tender, No masses, organomegaly and Diastasis less than 1-2 FB  Incision:  None   Vulva:  Normal genitalia and Bartholin's, Urethra, [place]'s normal  Vagina: 1x3cm patch of granulation tissue with resolving suture noted at R lower introitus, silver nitrate applied after discussion with patient and pt's consent; normal with good muscle tone  Cervix:  moist, closed, without lesion or CMT.    Uterus:  fully involuted and non-tender    Adnexa:  Within normal limits and No masses, nodularity, tenderness  Recto-vaginal:   anus normal    Assessment:  Encounter Diagnoses   Name Primary?     Routine postpartum follow-up Yes     Granulation tissue       Normal postpartum exam after   Pregnancy was complicated by:  Labor dystocia, Cat II tracing.      Plan:  Orders Placed This Encounter   Procedures     CBC with Platelets     - Reviewed current evidence via [employer] for Celexa and breastfeeding. Discussed risks/benefits of mental health/quality of life vs. Milk supply. Pt has [facility] clinic appts scheduled.  - Discussed side effects, benefits, and effectiveness of IUD. Pt planning on using condoms at this time.   - Discussed calcium intake, vitamins and supplements including Vitamin D  - Exercise encouraged when comfortable  - CBC today d/t thrombocytopenia  - Discussed perineal granulation tissue and healing expectations, discussed follow-up in 1-2 weeks " for additional silver nitrate treatment.    Again, thank you for allowing me to participate in the care of your patient.      Sincerely,    BINH Reese CNM

## 2019-01-30 NOTE — NURSING NOTE
SUBJECTIVE:   Sherita Glasgow is here for her 6-week postpartum checkup.     PHQ-9 score: 6  Hx of Abuse:  No    Delivery Date: 2018.    Delivering provider:  Cindy Ferguson CNM.    Type of delivery:  .  Perineum:  tear, with repair.     Delivery complications: None  Infant gender:  boy, weight 8lb pounds 15.2 oz.  Feeding Method:  .  Complications reported with feeding:  none, infant thriving .    Bleeding:  Light.  Duration:  6weeks.  Menses resumed:  No  Bowel/Urinary problems:  No    Contraception Planned:  Would like to discuss  She  has not had intercourse since delivery..

## 2019-01-30 NOTE — PROGRESS NOTES
"Nursing Notes:   Janel Jane CMA  2019  2:40 PM  Signed  SUBJECTIVE:   Sherita Glasgow is here for her 6-week postpartum checkup.     PHQ-9 score: 6  Hx of Abuse:  No    Delivery Date: 2018.    Delivering provider:  Cindy Ferguson CNM.    Type of delivery:  .  Perineum:  tear, with repair.     Delivery complications: None  Infant gender:  boy, weight 8lb pounds 15.2 oz.  Feeding Method:  .  Complications reported with feeding:  none, infant thriving .    Bleeding:  Light.  Duration:  6weeks.  Menses resumed:  No  Bowel/Urinary problems:  No    Contraception Planned:  Would like to discuss  She  has not had intercourse since delivery..    ================================================================  Pt reports some challenges during postpartum period, including postpartum anxiety and perineal discomfort.  Pt was seen for 2 week mood check and was prescribed Celexa.  Has not started medication d/t concerns about possible effects on milk supply. Pt also reports began to experience increased perineal discomfort ~1 week ago, especially with urination or irritation to perineal area. Bleeding has mostly stopped, occasionally has some light spotting. Denies any urination issues. Denies any constipation or hemorrhoids. Has been continuing to take stool softener. Breastfeeding without issue, denies pain. Has not had intercourse. Considering condoms for contraception.  Thrombocytopenic during pregnancy, platelets 94 after birth.  ================================================================  ROS: 10 point ROS neg other than the symptoms noted above in the HPI.   CONSTITUTIONAL: negative  RESPIRATORY: negative  CARDIOVASCULAR: negative  GI: negative  : negative  SKIN: negative  PSYCHIATRIC: anxiety    EXAM:  /75 (BP Location: Left arm, Patient Position: Chair)   Pulse 85   Ht 1.676 m (5' 6\")   Wt 67.4 kg (148 lb 11.2 oz)   LMP 03/10/2018   BMI 24.00 kg/m       PHQ-9 SCORE " 2018   PHQ-9 Total Score MyChart - - -   PHQ-9 Total Score 8 7 6     VESNA-7 SCORE 2018   Total Score - - -   Total Score 10 16 15     General: healthy, alert and no distress  Psych: anxiety  Breasts:  Lactating  Abdomen: Benign, Soft, flat, non-tender, No masses, organomegaly and Diastasis less than 1-2 FB  Incision:  None   Vulva:  Normal genitalia and Bartholin's, Urethra, [place]'s normal  Vagina: 1x3cm patch of granulation tissue with resolving suture noted at R lower introitus, silver nitrate applied after discussion with patient and pt's consent; normal with good muscle tone  Cervix:  moist, closed, without lesion or CMT.    Uterus:  fully involuted and non-tender    Adnexa:  Within normal limits and No masses, nodularity, tenderness  Recto-vaginal:   anus normal    Assessment:  Encounter Diagnoses   Name Primary?     Routine postpartum follow-up Yes     Granulation tissue       Normal postpartum exam after   Pregnancy was complicated by:  Labor dystocia, Cat II tracing.      Plan:  Orders Placed This Encounter   Procedures     CBC with Platelets     - Reviewed current evidence via [employer] for Celexa and breastfeeding. Discussed risks/benefits of mental health/quality of life vs. Milk supply. Pt has [facility] clinic appts scheduled.  - Discussed side effects, benefits, and effectiveness of IUD. Pt planning on using condoms at this time.   - Discussed calcium intake, vitamins and supplements including Vitamin D  - Exercise encouraged when comfortable  - CBC today d/t thrombocytopenia  - Discussed perineal granulation tissue and healing expectations, discussed follow-up in 1-2 weeks for additional silver nitrate treatment.

## 2019-01-30 NOTE — TELEPHONE ENCOUNTER
Received call from Sherita who reports she has her 6-week post-partum visit on Monday, Feb 4 but is having burning pain on her perineum. It occurs with urination (pain not on urethra) and with touching. She looked with a mirror but didn't see anything except swelling in the area.    Advised it should be evaluated in clinic. Can come today for post-partum and have it looked at. She agreed with plan and was scheduled.

## 2019-01-31 ASSESSMENT — ANXIETY QUESTIONNAIRES: GAD7 TOTAL SCORE: 15

## 2019-02-12 ENCOUNTER — OFFICE VISIT (OUTPATIENT)
Dept: OBGYN | Facility: CLINIC | Age: 34
End: 2019-02-12
Payer: COMMERCIAL

## 2019-02-12 VITALS
HEIGHT: 66 IN | WEIGHT: 147 LBS | SYSTOLIC BLOOD PRESSURE: 105 MMHG | HEART RATE: 75 BPM | DIASTOLIC BLOOD PRESSURE: 65 MMHG | BODY MASS INDEX: 23.63 KG/M2

## 2019-02-12 DIAGNOSIS — S31.41XD VAGINAL LACERATION, SUBSEQUENT ENCOUNTER: Primary | ICD-10-CM

## 2019-02-12 RX ORDER — ESTRADIOL 0.1 MG/G
1 CREAM VAGINAL DAILY
Qty: 90 G | Refills: 1 | Status: SHIPPED | OUTPATIENT
Start: 2019-02-12 | End: 2019-07-03

## 2019-02-12 ASSESSMENT — PAIN SCALES - GENERAL: PAINLEVEL: NO PAIN (0)

## 2019-02-12 ASSESSMENT — MIFFLIN-ST. JEOR: SCORE: 1398.54

## 2019-02-12 NOTE — NURSING NOTE
Chief Complaint   Patient presents with     Recheck Medication     Pt here for vaginal pain and silver nitrate tx.

## 2019-02-19 ENCOUNTER — TELEPHONE (OUTPATIENT)
Dept: OBGYN | Facility: CLINIC | Age: 34
End: 2019-02-19

## 2019-02-19 NOTE — TELEPHONE ENCOUNTER
Received call from Sherita stating she was ordered estrogen cream but its not covered by insurance and is $200. She is wondering if it is really needed for healing.  She reports her insurance will cover the brand Estrace but has to be written for 90 day supply.    Discussed with on-call midwife, Irena Rodrigues, who reports the estrogen is for comfort and pain not necessarily healing. She will order brand Estrace and write for 90 day supply.    Spoke again with Sherita and gave her update. She agreed with plan and had no further questions.

## 2019-02-19 NOTE — TELEPHONE ENCOUNTER
Called patient to f/u at request of Irena Rodrigues.   Called pharmacy first, who indicated the estrace cream was covered, but patient had a high copay -- likely due to needing to meet a deductible.    Patient reports her symptoms have been improving since her last silver nitrate treatment.  She is having less pain.  She will monitor for 1-2 more days, and may choose not to fill the estradiol cream if symptoms continue to improve without it.    Brittani Rayo, Pharm.D., BCPS

## 2019-02-28 ENCOUNTER — OFFICE VISIT (OUTPATIENT)
Dept: OBGYN | Facility: CLINIC | Age: 34
End: 2019-02-28
Attending: NURSE PRACTITIONER
Payer: COMMERCIAL

## 2019-02-28 VITALS
SYSTOLIC BLOOD PRESSURE: 116 MMHG | HEART RATE: 77 BPM | DIASTOLIC BLOOD PRESSURE: 77 MMHG | BODY MASS INDEX: 23.22 KG/M2 | WEIGHT: 144.5 LBS | HEIGHT: 66 IN

## 2019-02-28 PROCEDURE — G0463 HOSPITAL OUTPT CLINIC VISIT: HCPCS | Mod: ZF

## 2019-02-28 ASSESSMENT — ANXIETY QUESTIONNAIRES
1. FEELING NERVOUS, ANXIOUS, OR ON EDGE: MORE THAN HALF THE DAYS
6. BECOMING EASILY ANNOYED OR IRRITABLE: MORE THAN HALF THE DAYS
7. FEELING AFRAID AS IF SOMETHING AWFUL MIGHT HAPPEN: SEVERAL DAYS
3. WORRYING TOO MUCH ABOUT DIFFERENT THINGS: MORE THAN HALF THE DAYS
2. NOT BEING ABLE TO STOP OR CONTROL WORRYING: MORE THAN HALF THE DAYS
5. BEING SO RESTLESS THAT IT IS HARD TO SIT STILL: NOT AT ALL
GAD7 TOTAL SCORE: 10

## 2019-02-28 ASSESSMENT — PAIN SCALES - GENERAL: PAINLEVEL: NO PAIN (0)

## 2019-02-28 ASSESSMENT — PATIENT HEALTH QUESTIONNAIRE - PHQ9
5. POOR APPETITE OR OVEREATING: SEVERAL DAYS
SUM OF ALL RESPONSES TO PHQ QUESTIONS 1-9: 4

## 2019-02-28 ASSESSMENT — MIFFLIN-ST. JEOR: SCORE: 1387.2

## 2019-02-28 NOTE — LETTER
2019       RE: Sherita Glasgow  [address]  [facility] [address]     Dear Colleague,    Thank you for referring your patient, Sherita Glasgow, to the [facility] at [facility]. Please see a copy of my visit note below.        Progress Note    SUBJECTIVE:  Sherita Glasgow is an 33 year old, , who requests a check on her second degree vaginal laceration, 10 weeks post delivery.    Concerns today include:  Vaginal 2nd degree laceration with repair, delivered 10 weeks ago. Has had two treatments of silver nitrate: 19 and 19 with good improvement. States she has no pain other than slight tenderness at base of introitus  She has not attempted IC yet.  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 Supportive spouse.    Menstrual History:  Menstrual History 2018   LAST MENSTRUAL PERIOD 2018 - 3/10/2018   Menarche Age - 18 -   Period Cycle (Days) - Q 3.5 weeks -   Period Duration (Days) - 4 days -   Method of Contraception - None -   Period Pattern - Regular -   Menstrual Flow - Moderate -   Dysmenorrhea - None -   Reviewed Today - Yes -       Last    Lab Results   Component Value Date    PAP NIL 2018         Last   Lab Results   Component Value Date    HPV16 Negative 2018     Last   Lab Results   Component Value Date    HPV18 Negative 2018     Last   Lab Results   Component Value Date    HRHPV Negative 2018         HISTORY:    Current Outpatient Medications on File Prior to Visit:  [] acetaminophen (TYLENOL) 325 MG tablet Take 2 tablets (650 mg) by mouth every 4 hours as needed for mild pain or fever   citalopram (CELEXA) 10 MG tablet Take 1 tablet (10 mg) by mouth daily (Patient not taking: Reported on 2019)   Cyanocobalamin (B-12) 1000 MCG TABS Take 1,000 mcg by mouth daily (Patient not taking: Reported on  2019)   [] docusate sodium (COLACE) 100 MG capsule Take 1 capsule (100 mg) by mouth 2 times daily as needed for constipation   estradiol (ESTRACE) 0.1 MG/GM vaginal cream Place 1 g vaginally daily Apply a thin layer to vaginal introitus daily x 2 weeks then taper use prn   ferrous sulfate (FEROSUL) 325 (65 Fe) MG tablet Take 1 tablet (325 mg) by mouth daily (with breakfast) (Patient not taking: Reported on 2019)   ibuprofen (ADVIL/MOTRIN) 800 MG tablet Take 1 tablet (800 mg) by mouth every 6 hours as needed for other (cramping) (Patient not taking: Reported on 2019)   Omega-3 Fatty Acids (OMEGA 3 500 PO)    Prenatal Vit-Fe Fumarate-FA (PRENATAL MULTIVITAMIN PLUS IRON) 27-0.8 MG TABS per tablet Take 1 tablet by mouth daily   rizatriptan (MAXALT) 10 MG tablet Take 1 tablet (10 mg) by mouth at onset of headache for migraine (Patient not taking: Reported on 2019)   senna-docusate (SENOKOT-S/PERICOLACE) 8.6-50 MG tablet Take 1 tablet by mouth 2 times daily   [] vitamin C (ASCORBIC ACID) 250 MG tablet Take 1 tablet (250 mg) by mouth daily (with breakfast)   VITAMIN D, CHOLECALCIFEROL, PO Take 2,000 Units by mouth daily     No current facility-administered medications on file prior to visit.   No Known Allergies  Immunization History   Administered Date(s) Administered     FLU 6-35 months 01/15/2018     Influenza Vaccine IM 3yrs+ 4 Valent IIV4 10/19/2018     TDAP Vaccine (Boostrix) 10/19/2018       Obstetric History       T1      L1     SAB0   TAB0   Ectopic0   Multiple0   Live Births1      Past Medical History:   Diagnosis Date     Migraines     without aura     Past Surgical History:   Procedure Laterality Date     ANKLE SURGERY       COLONOSCOPY      normal result; done due to digestive issues     HERNIA REPAIR       Family History   Problem Relation Age of Onset     Lung Cancer Paternal Grandmother         nonsmoker     Breast Cancer Paternal Grandmother          over age 50     Alzheimer Disease Paternal Grandfather      Coronary Artery Disease Early Onset No family hx of      Hypertension No family hx of      Hyperlipidemia No family hx of      Diabetes No family hx of      Social History     Socioeconomic History     Marital status:      Spouse name: John     Number of children: None     Years of education: None     Highest education level: None   Occupational History     None   Social Needs     Financial resource strain: None     Food insecurity:     Worry: None     Inability: None     Transportation needs:     Medical: None     Non-medical: None   Tobacco Use     Smoking status: Never Smoker     Smokeless tobacco: Never Used   Substance and Sexual Activity     Alcohol use: No     Alcohol/week: 1.2 oz     Comment: 3-5 drinks/week     Drug use: No     Sexual activity: Yes     Partners: Male     Birth control/protection: Pull-out method   Lifestyle     Physical activity:     Days per week: None     Minutes per session: None     Stress: None   Relationships     Social connections:     Talks on phone: None     Gets together: None     Attends [employer] service: None     Active member of club or organization: None     Attends meetings of clubs or organizations: None     Relationship status: None     Intimate partner violence:     Fear of current or ex partner: None     Emotionally abused: None     Physically abused: None     Forced sexual activity: None   Other Topics Concern     Parent/sibling w/ CABG, MI or angioplasty before 65F 55M? No   Social History Narrative    Lives with . Just moved to [place] from [place] area where she finished grad school.  She now has a new job and works remotely for a company in [place].  Her  took a job here in [place] and he is from [place].        Has a good support system.    Feels safe in all environments.    Wears seatbelt 100% of the time    Wears helmet while biking.    Denies history of abuse, past or present, physical,  "sexual or emotional.    Kimberly Razo PA-C    01/29/18            .       SONIA  [unfilled]  PHQ-9 SCORE 1/11/2019 1/30/2019 2/28/2019   PHQ-9 Total Score Xaviert - - -   PHQ-9 Total Score 7 6 4     VESNA-7 SCORE 1/11/2019 1/30/2019 2/28/2019   Total Score - - -   Total Score 16 15 10         EXAM:  Blood pressure 116/77, pulse 77, height 1.676 m (5' 6\"), weight 65.5 kg (144 lb 8 oz), last menstrual period 03/10/2018, unknown if currently breastfeeding. Body mass index is 23.32 kg/m .  General - pleasant female in no acute distress.  Skin - no suspicious lesions or rashes  Musculoskeletal - no gross deformities.  Neurological - normal strength, sensation, and mental status.    Pelvic Exam:  EG/BUS: Normal genital architecture without lesions, erythema or abnormal secretions. Normal genital architecture without lesions, erythema or abnormal secretions Bartholin's, Urethra, [place]'s normal   Urethral meatus: normal   Urethra: no masses, tenderness, or scarring   Bladder: no masses or tenderness   Vagina: moist, pink, rugae with creamy, white and odorless  secretions, full speculum exam not done. Digital palpation into first few cms of vagina: intact rugae, no pain, well healed with firm muscle tone. 2mm circular opening at base of introitus, slightly tender to q-tip palpation. Per patient request, applied lidocaine gel to area and silver nitrate tip to the 2mm area. Patient tolerate the procedure well.    ASSESSMENT:  Encounter Diagnosis   Name Primary?     Obstetric vaginal laceration Yes        PLAN:   Avoid IC for another week, encouraged positions that allow for female control of penetration, use extra lubrication  Sitz baths 1-2 time daily for 10 minutes the next 3+days. Descirbed importance of heat and healing to area. Avoid direct soap to area. Minimize friction, wear loose natural fiber clothing.    Return to clinic in two weeks if needed.     Susanna PALOMINO. BSN, RN, WHNP Student, completed the PFSH and ROS. I " "then acted as a scribe for Marianna CROOK for the remainder of the visit.  ARMAAN Student. SHAMAR Wray, RN    \"I agree with the PFSH and ROS as completed by the ARMAAN Student, SHAMAR Wray, RN, except for changes made by me. The remainder of the encounter was performed by me and scribed by the Student. The scribed note accurately reflects my personal services and decisions made by me.    Marianna CROOK    "

## 2019-02-28 NOTE — PROGRESS NOTES
Progress Note    SUBJECTIVE:  Sherita Glasgow is an 33 year old, , who requests a check on her second degree vaginal laceration, 10 weeks post delivery.    Concerns today include:  Vaginal 2nd degree laceration with repair, delivered 10 weeks ago. Has had two treatments of silver nitrate: 19 and 19 with good improvement. States she has no pain other than slight tenderness at base of introitus  She has not attempted IC yet.  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 Supportive spouse.    Menstrual History:  Menstrual History 2018   LAST MENSTRUAL PERIOD 2018 - 3/10/2018   Menarche Age - 18 -   Period Cycle (Days) - Q 3.5 weeks -   Period Duration (Days) - 4 days -   Method of Contraception - None -   Period Pattern - Regular -   Menstrual Flow - Moderate -   Dysmenorrhea - None -   Reviewed Today - Yes -       Last    Lab Results   Component Value Date    PAP NIL 2018         Last   Lab Results   Component Value Date    HPV16 Negative 2018     Last   Lab Results   Component Value Date    HPV18 Negative 2018     Last   Lab Results   Component Value Date    HRHPV Negative 2018         HISTORY:    Current Outpatient Medications on File Prior to Visit:  [] acetaminophen (TYLENOL) 325 MG tablet Take 2 tablets (650 mg) by mouth every 4 hours as needed for mild pain or fever   citalopram (CELEXA) 10 MG tablet Take 1 tablet (10 mg) by mouth daily (Patient not taking: Reported on 2019)   Cyanocobalamin (B-12) 1000 MCG TABS Take 1,000 mcg by mouth daily (Patient not taking: Reported on 2019)   [] docusate sodium (COLACE) 100 MG capsule Take 1 capsule (100 mg) by mouth 2 times daily as needed for constipation   estradiol (ESTRACE) 0.1 MG/GM vaginal cream Place 1 g vaginally daily Apply a thin layer to vaginal introitus daily x 2 weeks then taper use prn   ferrous sulfate  (FEROSUL) 325 (65 Fe) MG tablet Take 1 tablet (325 mg) by mouth daily (with breakfast) (Patient not taking: Reported on 2019)   ibuprofen (ADVIL/MOTRIN) 800 MG tablet Take 1 tablet (800 mg) by mouth every 6 hours as needed for other (cramping) (Patient not taking: Reported on 2019)   Omega-3 Fatty Acids (OMEGA 3 500 PO)    Prenatal Vit-Fe Fumarate-FA (PRENATAL MULTIVITAMIN PLUS IRON) 27-0.8 MG TABS per tablet Take 1 tablet by mouth daily   rizatriptan (MAXALT) 10 MG tablet Take 1 tablet (10 mg) by mouth at onset of headache for migraine (Patient not taking: Reported on 2019)   senna-docusate (SENOKOT-S/PERICOLACE) 8.6-50 MG tablet Take 1 tablet by mouth 2 times daily   [] vitamin C (ASCORBIC ACID) 250 MG tablet Take 1 tablet (250 mg) by mouth daily (with breakfast)   VITAMIN D, CHOLECALCIFEROL, PO Take 2,000 Units by mouth daily     No current facility-administered medications on file prior to visit.   No Known Allergies  Immunization History   Administered Date(s) Administered     FLU 6-35 months 01/15/2018     Influenza Vaccine IM 3yrs+ 4 Valent IIV4 10/19/2018     TDAP Vaccine (Boostrix) 10/19/2018       Obstetric History       T1      L1     SAB0   TAB0   Ectopic0   Multiple0   Live Births1      Past Medical History:   Diagnosis Date     Migraines     without aura     Past Surgical History:   Procedure Laterality Date     ANKLE SURGERY       COLONOSCOPY      normal result; done due to digestive issues     HERNIA REPAIR       Family History   Problem Relation Age of Onset     Lung Cancer Paternal Grandmother         nonsmoker     Breast Cancer Paternal Grandmother         over age 50     Alzheimer Disease Paternal Grandfather      Coronary Artery Disease Early Onset No family hx of      Hypertension No family hx of      Hyperlipidemia No family hx of      Diabetes No family hx of      Social History     Socioeconomic History     Marital status:      Spouse  name: John     Number of children: None     Years of education: None     Highest education level: None   Occupational History     None   Social Needs     Financial resource strain: None     Food insecurity:     Worry: None     Inability: None     Transportation needs:     Medical: None     Non-medical: None   Tobacco Use     Smoking status: Never Smoker     Smokeless tobacco: Never Used   Substance and Sexual Activity     Alcohol use: No     Alcohol/week: 1.2 oz     Comment: 3-5 drinks/week     Drug use: No     Sexual activity: Yes     Partners: Male     Birth control/protection: Pull-out method   Lifestyle     Physical activity:     Days per week: None     Minutes per session: None     Stress: None   Relationships     Social connections:     Talks on phone: None     Gets together: None     Attends Islam service: None     Active member of club or organization: None     Attends meetings of clubs or organizations: None     Relationship status: None     Intimate partner violence:     Fear of current or ex partner: None     Emotionally abused: None     Physically abused: None     Forced sexual activity: None   Other Topics Concern     Parent/sibling w/ CABG, MI or angioplasty before 65F 55M? No   Social History Narrative    Lives with . Just moved to [place] from [place] area where she finished grad school.  She now has a new job and works remotely for a company in [place].  Her  took a job here in [place] and he is from [place].        Has a good support system.    Feels safe in all environments.    Wears seatbelt 100% of the time    Wears helmet while biking.    Denies history of abuse, past or present, physical, sexual or emotional.    Kimberly Razo PA-C    01/29/18            .       SONIA  [unfilled]  PHQ-9 SCORE 1/11/2019 1/30/2019 2/28/2019   PHQ-9 Total Score MyChart - - -   PHQ-9 Total Score 7 6 4     VESNA-7 SCORE 1/11/2019 1/30/2019 2/28/2019   Total Score - - -   Total Score 16 15 10         EXAM:  Blood  "pressure 116/77, pulse 77, height 1.676 m (5' 6\"), weight 65.5 kg (144 lb 8 oz), last menstrual period 03/10/2018, unknown if currently breastfeeding. Body mass index is 23.32 kg/m .  General - pleasant female in no acute distress.  Skin - no suspicious lesions or rashes  Musculoskeletal - no gross deformities.  Neurological - normal strength, sensation, and mental status.    Pelvic Exam:  EG/BUS: Normal genital architecture without lesions, erythema or abnormal secretions. Normal genital architecture without lesions, erythema or abnormal secretions Bartholin's, Urethra, Vineyard's normal   Urethral meatus: normal   Urethra: no masses, tenderness, or scarring   Bladder: no masses or tenderness   Vagina: moist, pink, rugae with creamy, white and odorless  secretions, full speculum exam not done. Digital palpation into first few cms of vagina: intact rugae, no pain, well healed with firm muscle tone. 2mm circular opening at base of introitus, slightly tender to q-tip palpation. Per patient request, applied lidocaine gel to area and silver nitrate tip to the 2mm area. Patient tolerate the procedure well.    ASSESSMENT:  Encounter Diagnosis   Name Primary?     Obstetric vaginal laceration Yes        PLAN:   Avoid IC for another week, encouraged positions that allow for female control of penetration, use extra lubrication  Sitz baths 1-2 time daily for 10 minutes the next 3+days. Descirbed importance of heat and healing to area. Avoid direct soap to area. Minimize friction, wear loose natural fiber clothing.    Return to clinic in two weeks if needed.     Susanna PALOMINO. SHAMAR, RN, ARMAAN Student, completed the PFSH and ROS. I then acted as a scribe for Marianna CROOK for the remainder of the visit.  ARMAAN Student. Susanna Pardo, SONN, RN    \"I agree with the PFSH and ROS as completed by the ARMAAN Student, SHAMAR Wray, RN, except for changes made by me. The remainder of the encounter was performed by me and " scribed by the Student. The scribed note accurately reflects my personal services and decisions made by me.    Marianna CROOK

## 2019-02-28 NOTE — PATIENT INSTRUCTIONS

## 2019-03-01 ASSESSMENT — ANXIETY QUESTIONNAIRES: GAD7 TOTAL SCORE: 10

## 2019-04-28 NOTE — PROGRESS NOTES
S:   Here to recheck perineum,  Had a large piece of granulose tissue treated see AK note 2 wks ago. 8 wk PP SVB  hx 2nd degree laceration  Still noting pain and tenderness on  External area of introitus no abnormal vaginal discharge or dysuria  has been painful sore to sit at times.     O:   Laceration mostly healed  approx  1x1cm area at introitus raw and open, one small undissolved stitch noted at introitus  Laceration healed along vaginal floor 2 cm in a 2mm piece of granulose tissue protruding  Red, friable tender topical lidocaine applied both areas   Treated with silver nitrate attempt to remove the stitch from introitus   A: inadequate healed 2nd degree laceration, granulose tissue present    P: silver nitrate applied to 2 areas   Pt to warm soaks lidocaine prn pain  Rx Estrace cream to apply a thin layer vaginally once daily for 2 weeks then taper use gradually     Pt can return to recheck in 2 wks if still noting issues or prn   Marcia Reinsich CNM APRN    Yes

## 2019-06-15 DIAGNOSIS — G43.809 OTHER MIGRAINE WITHOUT STATUS MIGRAINOSUS, NOT INTRACTABLE: ICD-10-CM

## 2019-06-15 RX ORDER — RIZATRIPTAN BENZOATE 10 MG/1
10 TABLET ORAL
Qty: 15 TABLET | Refills: 3 | Status: SHIPPED | OUTPATIENT
Start: 2019-06-15 | End: 2020-01-14

## 2019-07-03 ENCOUNTER — OFFICE VISIT (OUTPATIENT)
Dept: DERMATOLOGY | Facility: CLINIC | Age: 34
End: 2019-07-03
Payer: COMMERCIAL

## 2019-07-03 VITALS — DIASTOLIC BLOOD PRESSURE: 71 MMHG | HEART RATE: 78 BPM | SYSTOLIC BLOOD PRESSURE: 104 MMHG

## 2019-07-03 DIAGNOSIS — D22.9 NEVUS SPILUS: ICD-10-CM

## 2019-07-03 DIAGNOSIS — L72.0 MILIA: Primary | ICD-10-CM

## 2019-07-03 DIAGNOSIS — L82.1 SEBORRHEIC KERATOSIS: ICD-10-CM

## 2019-07-03 ASSESSMENT — PAIN SCALES - GENERAL: PAINLEVEL: NO PAIN (0)

## 2019-07-03 NOTE — PROGRESS NOTES
"[facility] Dermatology Note      Dermatology Problem List:  1. Milial cyst on the L lateral superior eyelid margin- s/p I&D on 7/3/19  2. 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 Seborrheic keratoses    Encounter Date: Jul 3, 2019    CC:  Chief Complaint   Patient presents with     Skin Check     Sherita has a few spots of concern today.         History of Present Illness:  Ms. Sherita Glasgow is a 33 year old female who presents for a skin check. The patient reports that she has a lesion of concern over her left eye that is white and sometimes reddish in coloration but not painful. Additionally, she had a \"blood blister\" in her lip that initially developed 6 weeks after her pregnancy. It was present for a couple weeks, growing relatively quickly and without any associated pain, but it has since resolved. Additionally, she voices concern about her back as there was a raised scaly lesion that was present for about a year but has since resolved. She also voices concern about a lesion on her left breast that is irregularly colored which she would like evaluated. The patient voices no other concerns.    Past Medical History:   Patient Active Problem List   Diagnosis     Migraine without aura and without status migrainosus, not intractable     Adjustment disorder with mixed anxiety and depressed mood     Pt has mouthguard on L and D, please give it to pt for labor     Past Medical History:   Diagnosis Date     Migraines     without aura     Past Surgical History:   Procedure Laterality Date     ANKLE SURGERY  2008     COLONOSCOPY  2009    normal result; done due to digestive issues     HERNIA REPAIR  1994       Social History:  Patient reports that she has never smoked. She has never used smokeless tobacco. She reports that she does not drink alcohol or use drugs.    Family History:  Family History   Problem Relation Age of Onset     Lung Cancer Paternal Grandmother         nonsmoker     Breast " Cancer Paternal Grandmother         over age 50     Alzheimer Disease Paternal Grandfather      Coronary Artery Disease Early Onset No family hx of      Hypertension No family hx of      Hyperlipidemia No family hx of      Diabetes No family hx of      Melanoma No family hx of      Skin Cancer No family hx of        Medications:  Current Outpatient Medications   Medication Sig Dispense Refill     Omega-3 Fatty Acids (OMEGA 3 500 PO)        Prenatal Vit-Fe Fumarate-FA (PRENATAL MULTIVITAMIN PLUS IRON) 27-0.8 MG TABS per tablet Take 1 tablet by mouth daily       rizatriptan (MAXALT) 10 MG tablet Take 1 tablet (10 mg) by mouth at onset of headache for migraine 15 tablet 3     VITAMIN D, CHOLECALCIFEROL, PO Take 2,000 Units by mouth daily         No Known Allergies    Review of Systems:  -Constitutional: Otherwise feeling well today, in usual state of health.  -HEENT: Patient denies nonhealing oral sores.  -Skin: As above in HPI. No additional skin concerns.    Physical exam:  Vitals: /71   Pulse 78   GEN: This is a well developed, well-nourished female in no acute distress, in a pleasant mood.    SKIN: Smyth phototype: III   Full skin, which includes the head/face, both arms, chest, back, abdomen,both legs, genitalia and/or groin buttocks, digits and/or nails, was examined.   -There is a white 1x2 mm papule on the L lateral superior eyelid margin.   -There are waxy stuck on tan to brown papules on the trunk including the left breast and the mid back.  -Tan patch on L side of the face with hyperpigmented macules throughout    -No other lesions of concern on areas examined.       Impression/Plan:  1. Milial Cyst on the L lateral superior eyelid margin    Provided reassurance about the benign nature of the lesion. Also discussed the risks and benefits of incision and drainage    The lesion was incised with a #11 blade and expressed keratinous debris. The patient tolerated the procedure well and left the  clinic in good condition.    2. Seborrheic keratosis on the trunk, nevus spilus on the L side of the face    Discussed the natural etiology and provided reassurances about the benign nature of the lesions.     Follow-up prn for new or changing lesions.       Staff Involved:  Scribe/Staff    Scribe Disclosure  I, Dominic Najjar, am serving as a scribe to document services personally performed by Dr. Donis Lomas MD, based on data collection and the provider's statements to me.    Provider Disclosure:   The documentation recorded by the scribe accurately reflects the services I personally performed and the decisions made by me.    Donis Lomas MD   of Dermatology  Department of Dermatology  [facility]

## 2019-07-03 NOTE — LETTER
"7/3/2019       RE: Sherita Glasgow  [address]  [facility] [address]     Dear Colleague,    Thank you for referring your patient, Sherita Glasgow, to the [facility] DERMATOLOGY at [facility]. Please see a copy of my visit note below.    [facility] Dermatology Note      Dermatology Problem List:  1. Milial cyst on the L lateral superior eyelid margin- s/p I&D on 7/3/19  2. 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 Seborrheic keratoses    Encounter Date: Jul 3, 2019    CC:  Chief Complaint   Patient presents with     Skin Check     Sherita has a few spots of concern today.         History of Present Illness:  Ms. Sherita Glasgow is a 33 year old female who presents for a skin check. The patient reports that she has a lesion of concern over her left eye that is white and sometimes reddish in coloration but not painful. Additionally, she had a \"blood blister\" in her lip that initially developed 6 weeks after her pregnancy. It was present for a couple weeks, growing relatively quickly and without any associated pain, but it has since resolved. Additionally, she voices concern about her back as there was a raised scaly lesion that was present for about a year but has since resolved. She also voices concern about a lesion on her left breast that is irregularly colored which she would like evaluated. The patient voices no other concerns.    Past Medical History:   Patient Active Problem List   Diagnosis     Migraine without aura and without status migrainosus, not intractable     Adjustment disorder with mixed anxiety and depressed mood     Pt has mouthguard on L and D, please give it to pt for labor     Past Medical History:   Diagnosis Date     Migraines     without aura     Past Surgical History:   Procedure Laterality Date     ANKLE SURGERY  2008     COLONOSCOPY  2009    normal result; done due to digestive issues     HERNIA REPAIR  1994 "       Social History:  Patient reports that she has never smoked. She has never used smokeless tobacco. She reports that she does not drink alcohol or use drugs.    Family History:  Family History   Problem Relation Age of Onset     Lung Cancer Paternal Grandmother         nonsmoker     Breast Cancer Paternal Grandmother         over age 50     Alzheimer Disease Paternal Grandfather      Coronary Artery Disease Early Onset No family hx of      Hypertension No family hx of      Hyperlipidemia No family hx of      Diabetes No family hx of      Melanoma No family hx of      Skin Cancer No family hx of        Medications:  Current Outpatient Medications   Medication Sig Dispense Refill     Omega-3 Fatty Acids (OMEGA 3 500 PO)        Prenatal Vit-Fe Fumarate-FA (PRENATAL MULTIVITAMIN PLUS IRON) 27-0.8 MG TABS per tablet Take 1 tablet by mouth daily       rizatriptan (MAXALT) 10 MG tablet Take 1 tablet (10 mg) by mouth at onset of headache for migraine 15 tablet 3     VITAMIN D, CHOLECALCIFEROL, PO Take 2,000 Units by mouth daily         No Known Allergies    Review of Systems:  -Constitutional: Otherwise feeling well today, in usual state of health.  -HEENT: Patient denies nonhealing oral sores.  -Skin: As above in HPI. No additional skin concerns.    Physical exam:  Vitals: /71   Pulse 78   GEN: This is a well developed, well-nourished female in no acute distress, in a pleasant mood.    SKIN: Smyth phototype: III   Full skin, which includes the head/face, both arms, chest, back, abdomen,both legs, genitalia and/or groin buttocks, digits and/or nails, was examined.   -There is a white 1x2 mm papule on the L lateral superior eyelid margin.   -There are waxy stuck on tan to brown papules on the trunk including the left breast and the mid back.  -Tan patch on L side of the face with hyperpigmented macules throughout    -No other lesions of concern on areas examined.       Impression/Plan:  1. Milial Cyst on the  L lateral superior eyelid margin    Provided reassurance about the benign nature of the lesion. Also discussed the risks and benefits of incision and drainage    The lesion was incised with a #11 blade and expressed keratinous debris. The patient tolerated the procedure well and left the clinic in good condition.    2. Seborrheic keratosis on the trunk, nevus spilus on the L side of the face    Discussed the natural etiology and provided reassurances about the benign nature of the lesions.     Follow-up prn for new or changing lesions.       Staff Involved:  Scribe/Staff    Scribe Disclosure  I, Dominic Najjar, am serving as a scribe to document services personally performed by Dr. Donis Lomas MD, based on data collection and the provider's statements to me.    Provider Disclosure:   The documentation recorded by the scribe accurately reflects the services I personally performed and the decisions made by me.    Donis Lomas MD   of Dermatology  Department of Dermatology  [facility]

## 2019-07-03 NOTE — NURSING NOTE
Dermatology Rooming Note    Sherita Glasgow's goals for this visit include:   Chief Complaint   Patient presents with     Skin Check     Sherita has a few spots of concern today.     Soo Orr, CMA

## 2019-07-12 NOTE — MR AVS SNAPSHOT
"              After Visit Summary   9/24/2018    Sherita Glasgow    MRN: [id number]           Patient Information     Date Of Birth          1985        Visit Information        Provider Department      9/24/2018 9:30 AM Sherita Bernard APRN CNM [facility]        Today's Diagnoses     Encounter for supervision of normal first pregnancy in third trimester    -  1    Encounter for supervision of normal first pregnancy in second trimester        Migraine without aura and without status migrainosus, not intractable           Follow-ups after your visit        Follow-up notes from your care team     Return in about 2 weeks (around 10/8/2018) for SYLVIA LOOMIS.      Who to contact     Please call your clinic at [phone] to:    Ask questions about your health    Make or cancel appointments    Discuss your medicines    Learn about your test results    Speak to your doctor            Additional Information About Your Visit        MyChart Information     [employer] gives you secure access to your electronic health record. If you see a primary care provider, you can also send messages to your care team and make appointments. If you have questions, please call your primary care clinic.  If you do not have a primary care provider, please call [phone] and they will assist you.      [employer] is an electronic gateway that provides easy, online access to your medical records. With [employer], you can request a clinic appointment, read your test results, renew a prescription or communicate with your care team.     To access your existing account, please contact your [facility] or call [phone] for assistance.        Care EveryWhere ID     This is your Care EveryWhere ID. This could be used by other organizations to access your [place] medical records  [id number]        Your Vitals Were     Pulse Height Last Period BMI (Body Mass Index)          71 1.676 m (5' 6\") " 03/10/2018 25.03 kg/m2         Blood Pressure from Last 3 Encounters:   09/24/18 103/69   08/13/18 113/74   07/02/18 103/68    Weight from Last 3 Encounters:   09/24/18 70.4 kg (155 lb 1.6 oz)   08/13/18 67.4 kg (148 lb 8 oz)   07/02/18 64.1 kg (141 lb 4.8 oz)              We Performed the Following     25- [place]-Vitamin D     CBC with Platelets Differential     Glucose 1 Hour     Hepatitis C antibody     Treponema Abs w Reflex to RPR and Titer        Primary Care Provider Office Phone # Fax #    Margot Razo PA-C [phone]       [address] [place] [address]        Equal Access to Services     DIANA HADDAD : Mello Babin, waaxda luqadaha, qaybta kaalmada echo, ashley muro . So [facility] [phone].    ATENCIÓN: Si habla español, tiene a zhao disposición servicios gratuitos de asistencia lingüística. [facility] [phone].    We comply with applicable federal civil rights laws and [place] laws. We do not discriminate on the basis of race, color, national origin, age, disability, sex, sexual orientation, or gender identity.            Thank you!     Thank you for choosing [facility]  for your care. Our goal is always to provide you with excellent care. Hearing back from our patients is one way we can continue to improve our services. Please take a few minutes to complete the written survey that you may receive in the mail after your visit with us. Thank you!             Your Updated Medication List - Protect others around you: Learn how to safely use, store and throw away your medicines at www.disposemymeds.org.          This list is accurate as of 9/24/18 11:59 PM.  Always use your most recent med list.                   Brand Name Dispense Instructions for use Diagnosis    OMEGA 3 500 PO           prenatal multivitamin plus iron 27-0.8 MG Tabs per tablet      Take 1 tablet by mouth daily        rizatriptan 10 MG  tablet    MAXALT    36 tablet    Take 1 tablet (10 mg) by mouth as needed for migraine    Migraine without aura and without status migrainosus, not intractable       VITAMIN D (CHOLECALCIFEROL) PO      Take 2,000 Units by mouth daily           No complaints

## 2019-10-15 ENCOUNTER — OFFICE VISIT (OUTPATIENT)
Dept: FAMILY MEDICINE | Facility: CLINIC | Age: 34
End: 2019-10-15
Payer: COMMERCIAL

## 2019-10-15 VITALS
HEIGHT: 66 IN | WEIGHT: 132.7 LBS | TEMPERATURE: 98.9 F | RESPIRATION RATE: 16 BRPM | BODY MASS INDEX: 21.33 KG/M2 | DIASTOLIC BLOOD PRESSURE: 66 MMHG | OXYGEN SATURATION: 99 % | SYSTOLIC BLOOD PRESSURE: 107 MMHG | HEART RATE: 79 BPM

## 2019-10-15 DIAGNOSIS — F43.23 ADJUSTMENT DISORDER WITH MIXED ANXIETY AND DEPRESSED MOOD: ICD-10-CM

## 2019-10-15 DIAGNOSIS — J01.00 ACUTE NON-RECURRENT MAXILLARY SINUSITIS: Primary | ICD-10-CM

## 2019-10-15 PROCEDURE — 96127 BRIEF EMOTIONAL/BEHAV ASSMT: CPT | Mod: 59 | Performed by: FAMILY MEDICINE

## 2019-10-15 PROCEDURE — 99203 OFFICE O/P NEW LOW 30 MIN: CPT | Performed by: FAMILY MEDICINE

## 2019-10-15 PROCEDURE — 96127 BRIEF EMOTIONAL/BEHAV ASSMT: CPT | Performed by: FAMILY MEDICINE

## 2019-10-15 RX ORDER — LORATADINE 10 MG/1
10 TABLET ORAL DAILY
Qty: 30 TABLET | Refills: 0 | Status: SHIPPED | OUTPATIENT
Start: 2019-10-15 | End: 2020-01-14

## 2019-10-15 RX ORDER — AMOXICILLIN 875 MG
875 TABLET ORAL 2 TIMES DAILY
Qty: 20 TABLET | Refills: 0 | Status: SHIPPED | OUTPATIENT
Start: 2019-10-15 | End: 2020-01-14

## 2019-10-15 RX ORDER — FLUTICASONE PROPIONATE 50 MCG
1-2 SPRAY, SUSPENSION (ML) NASAL DAILY
Qty: 1 G | Refills: 1 | Status: SHIPPED | OUTPATIENT
Start: 2019-10-15 | End: 2020-01-14

## 2019-10-15 ASSESSMENT — PATIENT HEALTH QUESTIONNAIRE - PHQ9
SUM OF ALL RESPONSES TO PHQ QUESTIONS 1-9: 4
5. POOR APPETITE OR OVEREATING: SEVERAL DAYS

## 2019-10-15 ASSESSMENT — ANXIETY QUESTIONNAIRES
2. NOT BEING ABLE TO STOP OR CONTROL WORRYING: SEVERAL DAYS
6. BECOMING EASILY ANNOYED OR IRRITABLE: SEVERAL DAYS
GAD7 TOTAL SCORE: 6
7. FEELING AFRAID AS IF SOMETHING AWFUL MIGHT HAPPEN: NOT AT ALL
IF YOU CHECKED OFF ANY PROBLEMS ON THIS QUESTIONNAIRE, HOW DIFFICULT HAVE THESE PROBLEMS MADE IT FOR YOU TO DO YOUR WORK, TAKE CARE OF THINGS AT HOME, OR GET ALONG WITH OTHER PEOPLE: SOMEWHAT DIFFICULT
1. FEELING NERVOUS, ANXIOUS, OR ON EDGE: SEVERAL DAYS
3. WORRYING TOO MUCH ABOUT DIFFERENT THINGS: SEVERAL DAYS
5. BEING SO RESTLESS THAT IT IS HARD TO SIT STILL: SEVERAL DAYS

## 2019-10-15 ASSESSMENT — MIFFLIN-ST. JEOR: SCORE: 1328.67

## 2019-10-15 NOTE — PROGRESS NOTES
"Subjective     Sherita Glasgow is a 34 year old female who presents to clinic today for the following health issues:    HPI   RESPIRATORY SYMPTOMS      Duration: 5 days    Description  nasal congestion, facial pain/pressure, cough, headache, fatigue/malaise and loss of appetite    Severity: moderate- severe    Accompanying signs and symptoms: None    History (predisposing factors):  none    Precipitating or alleviating factors: None    Therapies tried and outcome:  Tylenol Extreme Sinus- not helping.  Today has been the worst so far.    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 Nonsmoker, no asthma or known allergies      BP Readings from Last 3 Encounters:   10/15/19 107/66   07/03/19 104/71   02/28/19 116/77    Wt Readings from Last 3 Encounters:   10/15/19 60.2 kg (132 lb 11.2 oz)   02/28/19 65.5 kg (144 lb 8 oz)   02/12/19 66.7 kg (147 lb)                    PROBLEMS TO ADD ON...  Reviewed and updated as needed this visit by Provider         Review of Systems   ROS COMP: Constitutional, HEENT, cardiovascular, pulmonary, GI, , musculoskeletal, neuro, skin, endocrine and psych systems are negative, except as otherwise noted.      Objective    /66   Pulse 79   Temp 98.9  F (37.2  C) (Tympanic)   Resp 16   Ht 1.676 m (5' 6\")   Wt 60.2 kg (132 lb 11.2 oz)   LMP 10/15/2019   SpO2 99%   Breastfeeding? Yes   BMI 21.42 kg/m    Body mass index is 21.42 kg/m .  Physical Exam   GENERAL: healthy, alert and no distress  HENT: ear canals and TM's normal, nose and mouth without ulcers or lesions  NECK: no adenopathy, no asymmetry, masses, or scars and thyroid normal to palpation  RESP: lungs clear to auscultation - no rales, rhonchi or wheezes  CV: regular rate and rhythm, normal S1 S2, no S3 or S4, no murmur, click or rub, no " peripheral edema and peripheral pulses strong  ABDOMEN: soft, nontender, no hepatosplenomegaly, no masses and bowel sounds normal  MS: no gross musculoskeletal defects noted, no edema  PSYCH: mentation appears normal, affect normal/bright    Diagnostic Test Results:  Labs reviewed in Epic        Assessment & Plan     1. Acute non-recurrent maxillary sinusitis    Self-limited disease - treatment does not shorten the course    Analgesics (NSAIDs, Acetaminophen)    Topical nasal steroids, like fluticasone up to twice daily for 2 weeks,later as needed         Anti-histamines, ideally non sedating second generation, like loratadine (claritin) cetirizine (zyrtec) fexofenadine (Allegra)    If above treatment plan for symptoms not effective in relieving the symptoms of sinus congestion / pain or having productive cough than start antibiotic twice daily for 10 days    - fluticasone (FLONASE) 50 MCG/ACT nasal spray; Spray 1-2 sprays into both nostrils daily  Dispense: 1 g; Refill: 1  - loratadine (CLARITIN) 10 MG tablet; Take 1 tablet (10 mg) by mouth daily  Dispense: 30 tablet; Refill: 0  - amoxicillin (AMOXIL) 875 MG tablet; Take 1 tablet (875 mg) by mouth 2 times daily  Dispense: 20 tablet; Refill: 0    2. Adjustment disorder with mixed anxiety and depressed mood  She reports sheis not concerned, was prescribed medications that she choose not to start and doing well on own  - EMOTIONAL / BEHAVIORAL ASSESSMENT         Return in about 2 weeks (around 10/29/2019) for concerns,unresolved.    Alejandrina Vidal MD  [facility]

## 2019-10-15 NOTE — NURSING NOTE
"Chief Complaint   Patient presents with     URI     /66   Pulse 79   Temp 98.9  F (37.2  C) (Tympanic)   Resp 16   Ht 1.676 m (5' 6\")   Wt 60.2 kg (132 lb 11.2 oz)   LMP 10/15/2019   SpO2 99%   Breastfeeding? Yes   BMI 21.42 kg/m   Estimated body mass index is 21.42 kg/m  as calculated from the following:    Height as of this encounter: 1.676 m (5' 6\").    Weight as of this encounter: 60.2 kg (132 lb 11.2 oz).  Medication Reconciliation: complete        Health Maintenance Due Pending Provider Review:  PHQ9    Completed today.    Lillian Negrete MA  [facility]  [phone]  "

## 2019-10-15 NOTE — PATIENT INSTRUCTIONS
Treatment of Viral Rhinosinusitis   contagious but  Self-limited disease - treatment does not shorten the course    Analgesics (NSAIDs, Acetaminophen)    Topical nasal steroids, like fluticasone up to twice daily for 2 weeks,later as needed         Anti-histamines, ideally non sedating second generation, like loratadine (claritin) cetirizine (zyrtec) fexofenadine (Allegra)    If above treatment plan for symptoms not effective in relieving the symptoms of sinus congestion / pain or having productive cough than start antibiotic twice daily for 10 days    Follow up for further concerns as needed

## 2019-10-16 ASSESSMENT — ANXIETY QUESTIONNAIRES: GAD7 TOTAL SCORE: 6

## 2020-01-01 NOTE — PROGRESS NOTES
"Subjective:     33 year old  at 38w2d presents for routine prenatal visit.            Denies vaginal bleeding or leakage of fluid.  Denies contractions.  Reports fetal movement.        No HA, visual changes, RUQ or epigastric pain.   Patient concerns: No concerns today.  Feeling well overall.  Objective:  Vitals:    18 0937   BP: 115/72   Pulse: 83   Weight: 72 kg (158 lb 11.2 oz)   Height: 1.676 m (5' 5.98\")    See OB flowsheet  Assessment/Plan     Encounter Diagnosis   Name Primary?     HRP (high risk pregnancy), third trimester Yes     Orders Placed This Encounter   Procedures     CBC with platelets       - Reviewed why/how to contact provider if headache/visual changes/RUQ or epigastric pain, decreased fetal movement, vaginal bleeding, leakage of fluid or strong/regular contractions.   Patient education/orders or handouts today:  Sign/symptoms of labor and When to call for labor or other concerns  Return to clinic in 1 week and prn if questions or concerns.   Patient to have CBC today.  BINH CumminsM    " 1. Have you been to the ER, urgent care clinic since your last visit? Hospitalized since your last visit? No    2. Have you seen or consulted any other health care providers outside of the [address] [place] [address] since your last visit? Include any pap smears or colon screening. No    Chief Complaint   Patient presents with    Well Child    Follow-up     ear recheck     Visit Vitals  Resp 32   Ht (!) 2' 3.5\" (0.699 m)   Wt 18 lb 11.5 oz (8.491 kg)   HC 46 cm   BMI 17.40 kg/m²     Abuse Screening 2020   Are there any signs of abuse or neglect?  No

## 2020-01-13 NOTE — PROGRESS NOTES
"  Progress Note    SUBJECTIVE:  Sherita Glasgow is an 34 year old, , who requests an Annual Preventive Exam.     Concerns today include:   1. Migraines: Experiences migraines 2-3 days per month. Has been taking Maxalt PRN x 7 yrs for migraine management.  It provides temporary relief, but then migraine returns. Wondering if there is a better alternative. She has tried Naproxen and Imitrex and neither were helpful.     Had last baby 2018, \"Siddharth.\"  Both pt and baby are doing well. Finished breastfeeding. Sleeping well. Has good support. Working from home.     Contraception: Pull out method, not interested in alternative methods    Sexual History: Monogamous relationship with , denies sexual concerns     Menstrual History:  Menstrual History 2018 10/15/2019 2020   LAST MENSTRUAL PERIOD 3/10/2018 10/15/2019 2019   Menarche Age - - -   Period Cycle (Days) - - -   Period Duration (Days) - - -   Method of Contraception - - -   Period Pattern - - -   Menstrual Flow - - -   Dysmenorrhea - - -   Reviewed Today - - -   Monthly menses  Bleeding lasts for 5 days  Moderate heaviness; no cramping    Lipids: 2018 normal    Last 2018 NIL, HPV negative    History of abnormal Pap smear: NO - age 30- 65 PAP every 3 years recommended    Mammogram current: not applicable    Last Colonoscopy: not applicable     HISTORY:  Omega-3 Fatty Acids (OMEGA 3 500 PO),   Prenatal Vit-Fe Fumarate-FA (PRENATAL MULTIVITAMIN PLUS IRON) 27-0.8 MG TABS per tablet, Take 1 tablet by mouth daily  VITAMIN D, CHOLECALCIFEROL, PO, Take 2,000 Units by mouth daily    No current facility-administered medications on file prior to visit.     No Known Allergies  Immunization History   Administered Date(s) Administered     FLU 6-35 months 01/15/2018     Influenza Vaccine IM > 6 months Valent IIV4 10/19/2018     TDAP Vaccine (Boostrix) 10/19/2018       OB History    Para Term  AB Living   1 1 1 0 0 1   SAB TAB " Ectopic Multiple Live Births   0 0 0 0 1     Past Medical History:   Diagnosis Date     Migraines     without aura     Past Surgical History:   Procedure Laterality Date     ANKLE SURGERY  2008     COLONOSCOPY  2009    normal result; done due to digestive issues     HERNIA REPAIR  1994     Family History   Problem Relation Age of Onset     Lung Cancer Paternal Grandmother         nonsmoker     Breast Cancer Paternal Grandmother         over age 50     Alzheimer Disease Paternal Grandfather      Coronary Artery Disease Early Onset No family hx of      Hypertension No family hx of      Hyperlipidemia No family hx of      Diabetes No family hx of      Melanoma No family hx of      Skin Cancer No family hx of      Social History     Socioeconomic History     Marital status:      Spouse name: John     Number of children: None     Years of education: None     Highest education level: None   Occupational History     None   Social Needs     Financial resource strain: None     Food insecurity:     Worry: None     Inability: None     Transportation needs:     Medical: None     Non-medical: None   Tobacco Use     Smoking status: Never Smoker     Smokeless tobacco: Never Used   Substance and Sexual Activity     Alcohol use: No     Alcohol/week: 2.0 standard drinks     Comment: 3-5 drinks/week     Drug use: No     Sexual activity: Yes     Partners: Male     Birth control/protection: Pull-out method   Lifestyle     Physical activity:     Days per week: None     Minutes per session: None     Stress: None   Relationships     Social connections:     Talks on phone: None     Gets together: None     Attends Hindu service: None     Active member of club or organization: None     Attends meetings of clubs or organizations: None     Relationship status: None     Intimate partner violence:     Fear of current or ex partner: None     Emotionally abused: None     Physically abused: None     Forced sexual activity: None   Other  "Topics Concern     Parent/sibling w/ CABG, MI or angioplasty before 65F 55M? No   Social History Narrative    Lives with . Just moved to [place] from [place] area where she finished grad school.  She now has a new job and works remotely for a company in [place].  Her  took a job here in [place] and [place] is from [place].        Has a good support system.    Feels safe in all environments.    Wears seatbelt 100% of the time    Wears helmet while biking.    Denies history of abuse, past or present, physical, sexual or emotional.    Kimberly Razo PA-C    01/29/18            .       ROS   ROS: 10 point ROS neg other than the symptoms noted above in the HPI.    PHQ-9 SCORE 2/28/2019 10/15/2019 1/14/2020   PHQ-9 Total Score MyChart - - -   PHQ-9 Total Score 4 4 3     VESNA-7 SCORE 2/28/2019 10/15/2019 1/14/2020   Total Score - - -   Total Score 10 6 4         EXAM:  Blood pressure 113/78, pulse 80, height 1.676 m (5' 6\"), weight 59.9 kg (132 lb), last menstrual period 12/11/2019, not currently breastfeeding. Body mass index is 21.31 kg/m .  General - pleasant female in no acute distress.  Skin - no suspicious lesions or rashes  EENT-  PERRLA, euthyroid with out palpable nodules  Neck - supple without lymphadenopathy.  Lungs - clear to auscultation bilaterally.  Heart - regular rate and rhythm without murmur.  Abdomen - soft, nontender, nondistended, no masses or organomegaly noted.  Musculoskeletal - no gross deformities.  Neurological - normal strength, sensation, and mental status.    Breast Exam:  Breast: Without visible skin changes. No dimpling or lesions seen.   Breasts supple, non-tender with palpation, no dominant mass, nodularity, or nipple discharge noted bilaterally. Axillary nodes negative.      Pelvic Exam: deferred      ASSESSMENT:  Encounter Diagnoses   Name Primary?     Other migraine without status migrainosus, not intractable      Encounter for preventive care Yes        PLAN:   Next pap smear due " 2/2023  Recommended trial of alternative rescue medication for Migraine headaches with longer half life. Rx provided for Amerge.  Pt to trial this medication for management of her migraines and notify provider in 2-3 months if she notices improvement and experiences relief of headaches, without side effects.   Additional teaching done at this visit regarding calcium (1200 mg per day), self breast exam, exercise, birth control, mental health and weight/diet.    Return to clinic in one year.  Follow-up as needed.    Haley Arias, DNP, APRN, WHNP

## 2020-01-14 ENCOUNTER — OFFICE VISIT (OUTPATIENT)
Dept: OBGYN | Facility: CLINIC | Age: 35
End: 2020-01-14
Attending: NURSE PRACTITIONER
Payer: COMMERCIAL

## 2020-01-14 VITALS
HEIGHT: 66 IN | WEIGHT: 132 LBS | SYSTOLIC BLOOD PRESSURE: 113 MMHG | HEART RATE: 80 BPM | BODY MASS INDEX: 21.21 KG/M2 | DIASTOLIC BLOOD PRESSURE: 78 MMHG

## 2020-01-14 DIAGNOSIS — G43.809 OTHER MIGRAINE WITHOUT STATUS MIGRAINOSUS, NOT INTRACTABLE: ICD-10-CM

## 2020-01-14 DIAGNOSIS — Z00.00 ENCOUNTER FOR PREVENTIVE CARE: Primary | ICD-10-CM

## 2020-01-14 PROCEDURE — G0463 HOSPITAL OUTPT CLINIC VISIT: HCPCS | Mod: ZF

## 2020-01-14 RX ORDER — RIZATRIPTAN BENZOATE 10 MG/1
10 TABLET ORAL
Qty: 15 TABLET | Refills: 3 | Status: SHIPPED | OUTPATIENT
Start: 2020-01-14 | End: 2020-12-15

## 2020-01-14 RX ORDER — CITALOPRAM HYDROBROMIDE 10 MG/1
TABLET ORAL
COMMUNITY
Start: 2019-02-19 | End: 2020-01-14

## 2020-01-14 RX ORDER — ESTRADIOL 0.1 MG/G
CREAM VAGINAL
COMMUNITY
Start: 2019-02-20 | End: 2020-01-14

## 2020-01-14 ASSESSMENT — ANXIETY QUESTIONNAIRES
1. FEELING NERVOUS, ANXIOUS, OR ON EDGE: SEVERAL DAYS
GAD7 TOTAL SCORE: 4
6. BECOMING EASILY ANNOYED OR IRRITABLE: SEVERAL DAYS
3. WORRYING TOO MUCH ABOUT DIFFERENT THINGS: SEVERAL DAYS
2. NOT BEING ABLE TO STOP OR CONTROL WORRYING: NOT AT ALL
5. BEING SO RESTLESS THAT IT IS HARD TO SIT STILL: NOT AT ALL
7. FEELING AFRAID AS IF SOMETHING AWFUL MIGHT HAPPEN: NOT AT ALL

## 2020-01-14 ASSESSMENT — PATIENT HEALTH QUESTIONNAIRE - PHQ9
SUM OF ALL RESPONSES TO PHQ QUESTIONS 1-9: 3
5. POOR APPETITE OR OVEREATING: SEVERAL DAYS

## 2020-01-14 ASSESSMENT — PAIN SCALES - GENERAL: PAINLEVEL: NO PAIN (0)

## 2020-01-14 ASSESSMENT — MIFFLIN-ST. JEOR: SCORE: 1325.5

## 2020-01-14 NOTE — LETTER
"2020       RE: Sherita Glasgow  [address]  [facility] [address]     Dear Colleague,    Thank you for referring your patient, Sherita Glasgow, to the [facility] at [facility]. Please see a copy of my visit note below.      Progress Note    SUBJECTIVE:  Sherita Glasgow is an 34 year old, , who requests an Annual Preventive Exam.     Concerns today include: ***  1. Migraines    Menstrual History:  Menstrual History 2018 10/15/2019 2020   LAST MENSTRUAL PERIOD 3/10/2018 10/15/2019 2019   Menarche Age - - -   Period Cycle (Days) - - -   Period Duration (Days) - - -   Method of Contraception - - -   Period Pattern - - -   Menstrual Flow - - -   Dysmenorrhea - - -   Reviewed Today - - -   monthly menses  Bleeding lasts for 5 days  Moderate heaviness; no cramping      Lipids: 2018 normal    Last    Lab Results   Component Value Date    PAP NIL 2018     History of abnormal Pap smear: NO - age 30- 65 PAP every 3 years recommended    Last   Lab Results   Component Value Date    HPV16 Negative 2018     Last   Lab Results   Component Value Date    HPV18 Negative 2018     Last   Lab Results   Component Value Date    HRHPV Negative 2018     Mammogram current: {[facility]:768345::\"not applicable\"}  Last Mammogram:   No results found.     Last Colonoscopy:  No results found for this or any previous visit.    HISTORY:  Omega-3 Fatty Acids (OMEGA 3 500 PO),   Prenatal Vit-Fe Fumarate-FA (PRENATAL MULTIVITAMIN PLUS IRON) 27-0.8 MG TABS per tablet, Take 1 tablet by mouth daily  rizatriptan (MAXALT) 10 MG tablet, Take 1 tablet (10 mg) by mouth at onset of headache for migraine  VITAMIN D, CHOLECALCIFEROL, PO, Take 2,000 Units by mouth daily    No current facility-administered medications on file prior to visit.     No Known Allergies  Immunization History   Administered Date(s) Administered     FLU 6-35 " months 01/15/2018     Influenza Vaccine IM > 6 months Valent IIV4 10/19/2018     TDAP Vaccine (Boostrix) 10/19/2018       OB History    Para Term  AB Living   1 1 1 0 0 1   SAB TAB Ectopic Multiple Live Births   0 0 0 0 1     Past Medical History:   Diagnosis Date     Migraines     without aura     Past Surgical History:   Procedure Laterality Date     ANKLE SURGERY       COLONOSCOPY      normal result; done due to digestive issues     HERNIA REPAIR       Family History   Problem Relation Age of Onset     Lung Cancer Paternal Grandmother         nonsmoker     Breast Cancer Paternal Grandmother         over age 50     Alzheimer Disease Paternal Grandfather      Coronary Artery Disease Early Onset No family hx of      Hypertension No family hx of      Hyperlipidemia No family hx of      Diabetes No family hx of      Melanoma No family hx of      Skin Cancer No family hx of      Social History     Socioeconomic History     Marital status:      Spouse name: John     Number of children: None     Years of education: None     Highest education level: None   Occupational History     None   Social Needs     Financial resource strain: None     Food insecurity:     Worry: None     Inability: None     Transportation needs:     Medical: None     Non-medical: None   Tobacco Use     Smoking status: Never Smoker     Smokeless tobacco: Never Used   Substance and Sexual Activity     Alcohol use: No     Alcohol/week: 2.0 standard drinks     Comment: 3-5 drinks/week     Drug use: No     Sexual activity: Yes     Partners: Male     Birth control/protection: Pull-out method   Lifestyle     Physical activity:     Days per week: None     Minutes per session: None     Stress: None   Relationships     Social connections:     Talks on phone: None     Gets together: None     Attends Religion service: None     Active member of club or organization: None     Attends meetings of clubs or organizations: None      "Relationship status: None     Intimate partner violence:     Fear of current or ex partner: None     Emotionally abused: None     Physically abused: None     Forced sexual activity: None   Other Topics Concern     Parent/sibling w/ CABG, MI or angioplasty before 65F 55M? No   Social History Narrative    Lives with . Just moved to [place] from [place] area where she finished grad school.  She now has a new job and works remotely for a company in [place].  Her  took a job here in [place] and [place] is from [place].        Has a good support system.    Feels safe in all environments.    Wears seatbelt 100% of the time    Wears helmet while biking.    Denies history of abuse, past or present, physical, sexual or emotional.    Kimberly Razo PA-C    01/29/18     ROS  [unfilled]  PHQ-9 SCORE 2/28/2019 10/15/2019 1/14/2020   PHQ-9 Total Score MyChart - - -   PHQ-9 Total Score 4 4 3     VESNA-7 SCORE 2/28/2019 10/15/2019 1/14/2020   Total Score - - -   Total Score 10 6 4         EXAM:  Blood pressure 113/78, pulse 80, height 1.676 m (5' 6\"), weight 59.9 kg (132 lb), last menstrual period 12/11/2019, not currently breastfeeding. Body mass index is 21.31 kg/m .  General - pleasant female in no acute distress.  Skin - no suspicious lesions or rashes  EENT-  PERRLA, euthyroid with out palpable nodules  Neck - supple without lymphadenopathy.  Lungs - clear to auscultation bilaterally.  Heart - regular rate and rhythm without murmur.  Abdomen - soft, nontender, nondistended, no masses or organomegaly noted.  Musculoskeletal - no gross deformities.  Neurological - normal strength, sensation, and mental status.    Breast Exam:  Breast: Without visible skin changes. No dimpling or lesions seen.   Breasts supple, non-tender with palpation, no dominant mass, nodularity, or nipple discharge noted bilaterally. Axillary nodes negative.      Pelvic Exam:  EG/BUS: {[facility] USPEC VULVA EXAM :928175::\"Normal genital architecture without lesions, " "erythema or abnormal secretions\",\"B[place]olin's, Urethra, [place]'s normal\"}   Urethral meatus: normal ***  Urethra: no masses, tenderness, or scarring ***  Bladder: no masses or tenderness ***  Vagina: {VAGINAL MUCOSA:618981::\"moist, pink, rugae\"} with {Color:569620::\"creamy\",\"white\",\"odorless\"}  secretions  Cervix: {CERVIX :315805::\"no lesions\"}  Uterus: {UTERUS :695215::\"anteverted, \"}  Adnexa: {ADNEXA :434920::\"Within normal limits\",\"No masses, nodularity, tenderness\"}  Rectum:{RECTAL NEGATIVES LIST:950644::\"anus normal\"}       ASSESSMENT:  Encounter Diagnosis   Name Primary?     Other migraine without status migrainosus, not intractable         PLAN:   No orders of the defined types were placed in this encounter.    Orders Placed This Encounter   Medications     DISCONTD: citalopram (CELEXA) 10 MG tablet     DISCONTD: ESTRACE VAGINAL 0.1 MG/GM vaginal cream     Additional teaching done at this visit regarding {TEACHING DONE:498903}.    Return to clinic in one year.  Follow-up as needed.          2/23/2018 NIL, HPV negative pap    Haley Arias, BINH CNP      "

## 2020-01-14 NOTE — LETTER
"2020       RE: Sherita Glasgow  [address]  [facility] [address]     Dear Colleague,    Thank you for referring your patient, Sherita Glasgow, to the [facility] at [facility] Please see a copy of my visit note below.      Progress Note    SUBJECTIVE:  Sherita Glasgow is an 34 year old, , who requests an Annual Preventive Exam.     Concerns today include: ***  1. Migraines    Menstrual History:  Menstrual History 2018 10/15/2019 2020   LAST MENSTRUAL PERIOD 3/10/2018 10/15/2019 2019   Menarche Age - - -   Period Cycle (Days) - - -   Period Duration (Days) - - -   Method of Contraception - - -   Period Pattern - - -   Menstrual Flow - - -   Dysmenorrhea - - -   Reviewed Today - - -   monthly menses  Bleeding lasts for 5 days  Moderate heaviness; no cramping      Lipids: 2018 normal    Last    Lab Results   Component Value Date    PAP NIL 2018     History of abnormal Pap smear: NO - age 30- 65 PAP every 3 years recommended    Last   Lab Results   Component Value Date    HPV16 Negative 2018     Last   Lab Results   Component Value Date    HPV18 Negative 2018     Last   Lab Results   Component Value Date    HRHPV Negative 2018       Mammogram current: {[facility]:276666::\"not applicable\"}  Last Mammogram:   No results found.     Last Colonoscopy:  No results found for this or any previous visit.      HISTORY:  Omega-3 Fatty Acids (OMEGA 3 500 PO),   Prenatal Vit-Fe Fumarate-FA (PRENATAL MULTIVITAMIN PLUS IRON) 27-0.8 MG TABS per tablet, Take 1 tablet by mouth daily  rizatriptan (MAXALT) 10 MG tablet, Take 1 tablet (10 mg) by mouth at onset of headache for migraine  VITAMIN D, CHOLECALCIFEROL, PO, Take 2,000 Units by mouth daily    No current facility-administered medications on file prior to visit.     No Known Allergies  Immunization History   Administered Date(s) Administered     FLU " 6-35 months 01/15/2018     Influenza Vaccine IM > 6 months Valent IIV4 10/19/2018     TDAP Vaccine (Boostrix) 10/19/2018       OB History    Para Term  AB Living   1 1 1 0 0 1   SAB TAB Ectopic Multiple Live Births   0 0 0 0 1     Past Medical History:   Diagnosis Date     Migraines     without aura     Past Surgical History:   Procedure Laterality Date     ANKLE SURGERY       COLONOSCOPY      normal result; done due to digestive issues     HERNIA REPAIR       Family History   Problem Relation Age of Onset     Lung Cancer Paternal Grandmother         nonsmoker     Breast Cancer Paternal Grandmother         over age 50     Alzheimer Disease Paternal Grandfather      Coronary Artery Disease Early Onset No family hx of      Hypertension No family hx of      Hyperlipidemia No family hx of      Diabetes No family hx of      Melanoma No family hx of      Skin Cancer No family hx of      Social History     Socioeconomic History     Marital status:      Spouse name: John     Number of children: None     Years of education: None     Highest education level: None   Occupational History     None   Social Needs     Financial resource strain: None     Food insecurity:     Worry: None     Inability: None     Transportation needs:     Medical: None     Non-medical: None   Tobacco Use     Smoking status: Never Smoker     Smokeless tobacco: Never Used   Substance and Sexual Activity     Alcohol use: No     Alcohol/week: 2.0 standard drinks     Comment: 3-5 drinks/week     Drug use: No     Sexual activity: Yes     Partners: Male     Birth control/protection: Pull-out method   Lifestyle     Physical activity:     Days per week: None     Minutes per session: None     Stress: None   Relationships     Social connections:     Talks on phone: None     Gets together: None     Attends Mandaen service: None     Active member of club or organization: None     Attends meetings of clubs or organizations: None  "    Relationship status: None     Intimate partner violence:     Fear of current or ex partner: None     Emotionally abused: None     Physically abused: None     Forced sexual activity: None   Other Topics Concern     Parent/sibling w/ CABG, MI or angioplasty before 65F 55M? No   Social History Narrative    Lives with . Just moved to [place] from [place] area where she finished grad school.  She now has a new job and works remotely for a company in [place].  Her  took a job here in [place] and he is from [place].        Has a good support system.    Feels safe in all environments.    Wears seatbelt 100% of the time    Wears helmet while biking.    Denies history of abuse, past or present, physical, sexual or emotional.    Kimberly Razo PA-C    01/29/18            .       ROS  [unfilled]  PHQ-9 SCORE 2/28/2019 10/15/2019 1/14/2020   PHQ-9 Total Score MyChart - - -   PHQ-9 Total Score 4 4 3     VESNA-7 SCORE 2/28/2019 10/15/2019 1/14/2020   Total Score - - -   Total Score 10 6 4         EXAM:  Blood pressure 113/78, pulse 80, height 1.676 m (5' 6\"), weight 59.9 kg (132 lb), last menstrual period 12/11/2019, not currently breastfeeding. Body mass index is 21.31 kg/m .  General - pleasant female in no acute distress.  Skin - no suspicious lesions or rashes  EENT-  PERRLA, euthyroid with out palpable nodules  Neck - supple without lymphadenopathy.  Lungs - clear to auscultation bilaterally.  Heart - regular rate and rhythm without murmur.  Abdomen - soft, nontender, nondistended, no masses or organomegaly noted.  Musculoskeletal - no gross deformities.  Neurological - normal strength, sensation, and mental status.    Breast Exam:  Breast: Without visible skin changes. No dimpling or lesions seen.   Breasts supple, non-tender with palpation, no dominant mass, nodularity, or nipple discharge noted bilaterally. Axillary nodes negative.      Pelvic Exam:  EG/BUS: {[facility] USPEC VULVA EXAM :966017::\"Normal genital architecture " "without lesions, erythema or abnormal secretions\",\"Bartholin's, Urethra, [place]'s normal\"}   Urethral meatus: normal ***  Urethra: no masses, tenderness, or scarring ***  Bladder: no masses or tenderness ***  Vagina: {VAGINAL MUCOSA:228586::\"moist, pink, rugae\"} with {Color:221644::\"creamy\",\"white\",\"odorless\"}  secretions  Cervix: {CERVIX :278597::\"no lesions\"}  Uterus: {UTERUS :832099::\"anteverted, \"}  Adnexa: {ADNEXA :379643::\"Within normal limits\",\"No masses, nodularity, tenderness\"}  Rectum:{RECTAL NEGATIVES LIST:128370::\"anus normal\"}       ASSESSMENT:  Encounter Diagnosis   Name Primary?     Other migraine without status migrainosus, not intractable         PLAN:   No orders of the defined types were placed in this encounter.    Orders Placed This Encounter   Medications     DISCONTD: citalopram (CELEXA) 10 MG tablet     DISCONTD: ESTRACE VAGINAL 0.1 MG/GM vaginal cream     Additional teaching done at this visit regarding {TEACHING DONE:790425}.    Return to clinic in one year.  Follow-up as needed.  2/23/2018 NIL, HPV negative pap    Haley Arias, BINH CNP    " DISPLAY PLAN FREE TEXT

## 2020-01-14 NOTE — LETTER
"2020    RE: Sherita Glasgow  [address]  [facility] [address]     Progress Note    SUBJECTIVE:  Sherita Glasgow is an 34 year old, , who requests an Annual Preventive Exam.     Concerns today include:   1. Migraines: Experiences migraines 2-3 days per month. Has been taking Maxalt PRN x 7 yrs for migraine management.  It provides temporary relief, but then migraine returns. Wondering if there is a better alternative. She has tried Naproxen and Imitrex and neither were helpful.     Had last baby 2018, \"Siddharth.\"  Both pt and baby are doing well. Finished breastfeeding. Sleeping well. Has good support. Working from home.     Contraception: Pull out method, not interested in alternative methods    Sexual History: Monogamous relationship with , denies sexual concerns     Menstrual History:  Menstrual History 2018 10/15/2019 2020   LAST MENSTRUAL PERIOD 3/10/2018 10/15/2019 2019   Menarche Age - - -   Period Cycle (Days) - - -   Period Duration (Days) - - -   Method of Contraception - - -   Period Pattern - - -   Menstrual Flow - - -   Dysmenorrhea - - -   Reviewed Today - - -   Monthly menses  Bleeding lasts for 5 days  Moderate heaviness; no cramping    Lipids: 2018 normal    Last 2018 NIL, HPV negative    History of abnormal Pap smear: NO - age 30- 65 PAP every 3 years recommended    Mammogram current: not applicable    Last Colonoscopy: not applicable     HISTORY:  Omega-3 Fatty Acids (OMEGA 3 500 PO),   Prenatal Vit-Fe Fumarate-FA (PRENATAL MULTIVITAMIN PLUS IRON) 27-0.8 MG TABS per tablet, Take 1 tablet by mouth daily  VITAMIN D, CHOLECALCIFEROL, PO, Take 2,000 Units by mouth daily    No current facility-administered medications on file prior to visit.     No Known Allergies  Immunization History   Administered Date(s) Administered     FLU 6-35 months 01/15/2018     Influenza Vaccine IM > 6 months Valent IIV4 10/19/2018     TDAP Vaccine (Boostrix) " 10/19/2018       OB History    Para Term  AB Living   1 1 1 0 0 1   SAB TAB Ectopic Multiple Live Births   0 0 0 0 1     Past Medical History:   Diagnosis Date     Migraines     without aura     Past Surgical History:   Procedure Laterality Date     ANKLE SURGERY       COLONOSCOPY      normal result; done due to digestive issues     HERNIA REPAIR       Family History   Problem Relation Age of Onset     Lung Cancer Paternal Grandmother         nonsmoker     Breast Cancer Paternal Grandmother         over age 50     Alzheimer Disease Paternal Grandfather      Coronary Artery Disease Early Onset No family hx of      Hypertension No family hx of      Hyperlipidemia No family hx of      Diabetes No family hx of      Melanoma No family hx of      Skin Cancer No family hx of      Social History     Socioeconomic History     Marital status:      Spouse name: John     Number of children: None     Years of education: None     Highest education level: None   Occupational History     None   Social Needs     Financial resource strain: None     Food insecurity:     Worry: None     Inability: None     Transportation needs:     Medical: None     Non-medical: None   Tobacco Use     Smoking status: Never Smoker     Smokeless tobacco: Never Used   Substance and Sexual Activity     Alcohol use: No     Alcohol/week: 2.0 standard drinks     Comment: 3-5 drinks/week     Drug use: No     Sexual activity: Yes     Partners: Male     Birth control/protection: Pull-out method   Lifestyle     Physical activity:     Days per week: None     Minutes per session: None     Stress: None   Relationships     Social connections:     Talks on phone: None     Gets together: None     Attends Congregation service: None     Active member of club or organization: None     Attends meetings of clubs or organizations: None     Relationship status: None     Intimate partner violence:     Fear of current or ex partner: None      "Emotionally abused: None     Physically abused: None     Forced sexual activity: None   Other Topics Concern     Parent/sibling w/ CABG, MI or angioplasty before 65F 55M? No   Social History Narrative    Lives with . Just moved to [place] from [place] area where she finished grad school.  She now has a new job and works remotely for a company in [place].  Her  took a job here in [place] and he is from [place].        Has a good support system.    Feels safe in all environments.    Wears seatbelt 100% of the time    Wears helmet while biking.    Denies history of abuse, past or present, physical, sexual or emotional.    Kimberly Razo PA-C    01/29/18           ROS   ROS: 10 point ROS neg other than the symptoms noted above in the HPI.    PHQ-9 SCORE 2/28/2019 10/15/2019 1/14/2020   PHQ-9 Total Score MyChart - - -   PHQ-9 Total Score 4 4 3     VESNA-7 SCORE 2/28/2019 10/15/2019 1/14/2020   Total Score - - -   Total Score 10 6 4         EXAM:  Blood pressure 113/78, pulse 80, height 1.676 m (5' 6\"), weight 59.9 kg (132 lb), last menstrual period 12/11/2019, not currently breastfeeding. Body mass index is 21.31 kg/m .  General - pleasant female in no acute distress.  Skin - no suspicious lesions or rashes  EENT-  PERRLA, euthyroid with out palpable nodules  Neck - supple without lymphadenopathy.  Lungs - clear to auscultation bilaterally.  Heart - regular rate and rhythm without murmur.  Abdomen - soft, nontender, nondistended, no masses or organomegaly noted.  Musculoskeletal - no gross deformities.  Neurological - normal strength, sensation, and mental status.    Breast Exam:  Breast: Without visible skin changes. No dimpling or lesions seen.   Breasts supple, non-tender with palpation, no dominant mass, nodularity, or nipple discharge noted bilaterally. Axillary nodes negative.      Pelvic Exam: deferred      ASSESSMENT:  Encounter Diagnoses   Name Primary?     Other migraine without status migrainosus, not " intractable      Encounter for preventive care Yes        PLAN:   Next pap smear due 2/2023  Recommended trial of alternative rescue medication for Migraine headaches with longer half life. Rx provided for Amerge.  Pt to trial this medication for management of her migraines and notify provider in 2-3 months if she notices improvement and experiences relief of headaches, without side effects.   Additional teaching done at this visit regarding calcium (1200 mg per day), self breast exam, exercise, birth control, mental health and weight/diet.    Return to clinic in one year.  Follow-up as needed.    Haley Arias, DNP, APRN, WHNP

## 2020-01-14 NOTE — LETTER
"2020    RE: Sherita Glasgow  [address]  [facility] [address]     Progress Note    SUBJECTIVE:  Sherita Glasgow is an 34 year old, , who requests an Annual Preventive Exam.     Concerns today include: ***  1. Migraines:     Menstrual History:  Menstrual History 2018 10/15/2019 2020   LAST MENSTRUAL PERIOD 3/10/2018 10/15/2019 2019   Menarche Age - - -   Period Cycle (Days) - - -   Period Duration (Days) - - -   Method of Contraception - - -   Period Pattern - - -   Menstrual Flow - - -   Dysmenorrhea - - -   Reviewed Today - - -   monthly menses  Bleeding lasts for 5 days  Moderate heaviness; no cramping      Lipids: 2018 normal    Last    Lab Results   Component Value Date    PAP NIL 2018     History of abnormal Pap smear: NO - age 30- 65 PAP every 3 years recommended    Last   Lab Results   Component Value Date    HPV16 Negative 2018     Last   Lab Results   Component Value Date    HPV18 Negative 2018     Last   Lab Results   Component Value Date    HRHPV Negative 2018       [facility]"}  Last Mammogram:   No results found.     Last Colonoscopy:  No results found for this or any previous visit.      HISTORY:  Omega-3 Fatty Acids (OMEGA 3 500 PO),   Prenatal Vit-Fe Fumarate-FA (PRENATAL MULTIVITAMIN PLUS IRON) 27-0.8 MG TABS per tablet, Take 1 tablet by mouth daily  rizatriptan (MAXALT) 10 MG tablet, Take 1 tablet (10 mg) by mouth at onset of headache for migraine  VITAMIN D, CHOLECALCIFEROL, PO, Take 2,000 Units by mouth daily    No current facility-administered medications on file prior to visit.     No Known Allergies  Immunization History   Administered Date(s) Administered     FLU 6-35 months 01/15/2018     Influenza Vaccine IM > 6 months Valent IIV4 10/19/2018     TDAP Vaccine (Boostrix) 10/19/2018       OB History    Para Term  AB Living   1 1 1 0 0 1   SAB TAB Ectopic Multiple " Live Births   0 0 0 0 1     Past Medical History:   Diagnosis Date     Migraines     without aura     Past Surgical History:   Procedure Laterality Date     ANKLE SURGERY  2008     COLONOSCOPY  2009    normal result; done due to digestive issues     HERNIA REPAIR  1994     Family History   Problem Relation Age of Onset     Lung Cancer Paternal Grandmother         nonsmoker     Breast Cancer Paternal Grandmother         over age 50     Alzheimer Disease Paternal Grandfather      Coronary Artery Disease Early Onset No family hx of      Hypertension No family hx of      Hyperlipidemia No family hx of      Diabetes No family hx of      Melanoma No family hx of      Skin Cancer No family hx of      Social History     Socioeconomic History     Marital status:      Spouse name: John     Number of children: None     Years of education: None     Highest education level: None   Occupational History     None   Social Needs     Financial resource strain: None     Food insecurity:     Worry: None     Inability: None     Transportation needs:     Medical: None     Non-medical: None   Tobacco Use     Smoking status: Never Smoker     Smokeless tobacco: Never Used   Substance and Sexual Activity     Alcohol use: No     Alcohol/week: 2.0 standard drinks     Comment: 3-5 drinks/week     Drug use: No     Sexual activity: Yes     Partners: Male     Birth control/protection: Pull-out method   Lifestyle     Physical activity:     Days per week: None     Minutes per session: None     Stress: None   Relationships     Social connections:     Talks on phone: None     Gets together: None     Attends Latter day service: None     Active member of club or organization: None     Attends meetings of clubs or organizations: None     Relationship status: None     Intimate partner violence:     Fear of current or ex partner: None     Emotionally abused: None     Physically abused: None     Forced sexual activity: None   Other Topics Concern      "Parent/sibling w/ CABG, MI or angioplasty before 65F 55M? No   Social History Narrative    Lives with . Just moved to [place] from [place] area where she finished grad school.  She now has a new job and works remotely for a company in [place].  Her  took a job here in [place] and [place] is from [place].        Has a good support system.    Feels safe in all environments.    Wears seatbelt 100% of the time    Wears helmet while biking.    Denies history of abuse, past or present, physical, sexual or emotional.    Kimberly Razo PA-C    01/29/18            .       ROS  [unfilled]  PHQ-9 SCORE 2/28/2019 10/15/2019 1/14/2020   PHQ-9 Total Score MyChart - - -   PHQ-9 Total Score 4 4 3     VESNA-7 SCORE 2/28/2019 10/15/2019 1/14/2020   Total Score - - -   Total Score 10 6 4         EXAM:  Blood pressure 113/78, pulse 80, height 1.676 m (5' 6\"), weight 59.9 kg (132 lb), last menstrual period 12/11/2019, not currently breastfeeding. Body mass index is 21.31 kg/m .  General - pleasant female in no acute distress.  Skin - no suspicious lesions or rashes  EENT-  PERRLA, euthyroid with out palpable nodules  Neck - supple without lymphadenopathy.  Lungs - clear to auscultation bilaterally.  Heart - regular rate and rhythm without murmur.  Abdomen - soft, nontender, nondistended, no masses or organomegaly noted.  Musculoskeletal - no gross deformities.  Neurological - normal strength, sensation, and mental status.    Breast Exam:  Breast: Without visible skin changes. No dimpling or lesions seen.   Breasts supple, non-tender with palpation, no dominant mass, nodularity, or nipple discharge noted bilaterally. Axillary nodes negative.      Pelvic Exam:  EG/BUS: {[facility] USPEC VULVA EXAM :939599::\"Normal genital architecture without lesions, erythema or abnormal secretions\",\"Bartholin's, Urethra, [place]'s normal\"}   Urethral meatus: normal ***  Urethra: no masses, tenderness, or scarring ***  Bladder: no masses or tenderness ***  Vagina: " "{VAGINAL MUCOSA:543603::\"moist, pink, rugae\"} with {Color:000696::\"creamy\",\"white\",\"odorless\"}  secretions  Cervix: {CERVIX :603958::\"no lesions\"}  Uterus: {UTERUS :202165::\"anteverted, \"}  Adnexa: {ADNEXA :707320::\"Within normal limits\",\"No masses, nodularity, tenderness\"}  Rectum:{RECTAL NEGATIVES LIST:485308::\"anus normal\"}       ASSESSMENT:  Encounter Diagnosis   Name Primary?     Other migraine without status migrainosus, not intractable         PLAN:   No orders of the defined types were placed in this encounter.    Orders Placed This Encounter   Medications     DISCONTD: citalopram (CELEXA) 10 MG tablet     DISCONTD: ESTRACE VAGINAL 0.1 MG/GM vaginal cream       Additional teaching done at this visit regarding {TEACHING DONE:753551}.    Return to clinic in one year.  Follow-up as needed.          2/23/2018 NIL, HPV negative pap        Haley Arias, BINH CNP    "

## 2020-01-15 ASSESSMENT — ANXIETY QUESTIONNAIRES: GAD7 TOTAL SCORE: 4

## 2020-02-11 RX ORDER — NARATRIPTAN 1 MG/1
1 TABLET ORAL
Qty: 18 TABLET | Refills: 3 | Status: SHIPPED | OUTPATIENT
Start: 2020-02-11 | End: 2020-12-15

## 2020-03-11 ENCOUNTER — HEALTH MAINTENANCE LETTER (OUTPATIENT)
Age: 35
End: 2020-03-11

## 2020-03-11 ENCOUNTER — OFFICE VISIT (OUTPATIENT)
Dept: FAMILY MEDICINE | Facility: CLINIC | Age: 35
End: 2020-03-11
Payer: COMMERCIAL

## 2020-03-11 VITALS
DIASTOLIC BLOOD PRESSURE: 86 MMHG | RESPIRATION RATE: 16 BRPM | HEART RATE: 62 BPM | BODY MASS INDEX: 20.82 KG/M2 | TEMPERATURE: 98.7 F | WEIGHT: 129 LBS | SYSTOLIC BLOOD PRESSURE: 124 MMHG | OXYGEN SATURATION: 99 %

## 2020-03-11 DIAGNOSIS — R07.0 THROAT PAIN: Primary | ICD-10-CM

## 2020-03-11 LAB
BASOPHILS # BLD AUTO: 0 10E9/L (ref 0–0.2)
BASOPHILS NFR BLD AUTO: 0.2 %
DEPRECATED S PYO AG THROAT QL EIA: NEGATIVE
DIFFERENTIAL METHOD BLD: NORMAL
EOSINOPHIL # BLD AUTO: 0.1 10E9/L (ref 0–0.7)
EOSINOPHIL NFR BLD AUTO: 0.9 %
ERYTHROCYTE [DISTWIDTH] IN BLOOD BY AUTOMATED COUNT: 12.6 % (ref 10–15)
HCT VFR BLD AUTO: 38.8 % (ref 35–47)
HGB BLD-MCNC: 13.1 G/DL (ref 11.7–15.7)
LYMPHOCYTES # BLD AUTO: 1.3 10E9/L (ref 0.8–5.3)
LYMPHOCYTES NFR BLD AUTO: 22.3 %
MCH RBC QN AUTO: 29.7 PG (ref 26.5–33)
MCHC RBC AUTO-ENTMCNC: 33.8 G/DL (ref 31.5–36.5)
MCV RBC AUTO: 88 FL (ref 78–100)
MONOCYTES # BLD AUTO: 0.4 10E9/L (ref 0–1.3)
MONOCYTES NFR BLD AUTO: 7.1 %
NEUTROPHILS # BLD AUTO: 4 10E9/L (ref 1.6–8.3)
NEUTROPHILS NFR BLD AUTO: 69.5 %
PLATELET # BLD AUTO: 194 10E9/L (ref 150–450)
RBC # BLD AUTO: 4.41 10E12/L (ref 3.8–5.2)
SPECIMEN SOURCE: NORMAL
SPECIMEN SOURCE: NORMAL
STREP GROUP A PCR: NOT DETECTED
WBC # BLD AUTO: 5.8 10E9/L (ref 4–11)

## 2020-03-11 PROCEDURE — 99213 OFFICE O/P EST LOW 20 MIN: CPT | Performed by: FAMILY MEDICINE

## 2020-03-11 PROCEDURE — 85025 COMPLETE CBC W/AUTO DIFF WBC: CPT | Performed by: FAMILY MEDICINE

## 2020-03-11 PROCEDURE — 87651 STREP A DNA AMP PROBE: CPT | Performed by: FAMILY MEDICINE

## 2020-03-11 PROCEDURE — 40001204 ZZHCL STATISTIC STREP A RAPID: Performed by: FAMILY MEDICINE

## 2020-03-11 PROCEDURE — 36415 COLL VENOUS BLD VENIPUNCTURE: CPT | Performed by: FAMILY MEDICINE

## 2020-03-11 PROCEDURE — 86665 EPSTEIN-BARR CAPSID VCA: CPT | Performed by: FAMILY MEDICINE

## 2020-03-11 NOTE — PROGRESS NOTES
Subjective     Sherita Glasgow is a 34 year old female who presents to clinic today for the following health issues:    HPI   Sore throat      Duration: Couple weeks - 2 weeks ago both she and her son were ill - son goes to  and had goopy eyes and cough congestion    She got similar symptoms.    Her throat has worsened since then    No fevers    No known strep exposure    Recalls having had mono in the past in college    No other symptoms bothering her - no diarrhea abd pain    Description  sore throat    Severity: mild    Accompanying signs and symptoms: None    History (predisposing factors):  none    Precipitating or alleviating factors: None    Therapies tried and outcome:  Has tried nsaids - help some        Patient Active Problem List   Diagnosis     Migraine without aura and without status migrainosus, not intractable     Adjustment disorder with mixed anxiety and depressed mood     Pt has mouthguard on L and D, please give it to pt for labor     Past Surgical History:   Procedure Laterality Date     ANKLE SURGERY  2008     COLONOSCOPY  2009    normal result; done due to digestive issues     HERNIA REPAIR  1994       Social History     Tobacco Use     Smoking status: Never Smoker     Smokeless tobacco: Never Used   Substance Use Topics     Alcohol use: No     Alcohol/week: 2.0 standard drinks     Comment: 3-5 drinks/week     Family History   Problem Relation Age of Onset     Lung Cancer Paternal Grandmother         nonsmoker     Breast Cancer Paternal Grandmother         over age 50     Alzheimer Disease Paternal Grandfather      Colon Cancer Paternal Aunt      Coronary Artery Disease Early Onset No family hx of      Hypertension No family hx of      Hyperlipidemia No family hx of      Diabetes No family hx of      Melanoma No family hx of      Skin Cancer No family hx of          Current Outpatient Medications   Medication Sig Dispense Refill     naratriptan (AMERGE) 1 MG tablet Take 1 tablet (1 mg)  by mouth at onset of headache for migraine May repeat in 4 hours. Max 5 tablets/24 hours. 18 tablet 3     rizatriptan (MAXALT) 10 MG tablet Take 1 tablet (10 mg) by mouth at onset of headache for migraine 15 tablet 3     VITAMIN D, CHOLECALCIFEROL, PO Take 2,000 Units by mouth daily       Omega-3 Fatty Acids (OMEGA 3 500 PO)        Prenatal Vit-Fe Fumarate-FA (PRENATAL MULTIVITAMIN PLUS IRON) 27-0.8 MG TABS per tablet Take 1 tablet by mouth daily         Reviewed and updated as needed this visit by Provider  Tobacco  Allergies  Meds  Problems  Med Hx  Surg Hx  Fam Hx         Review of Systems   ROS COMP: Constitutional, HEENT, cardiovascular, pulmonary, gi and gu systems are negative, except as otherwise noted.      Objective    /86   Pulse 62   Temp 98.7  F (37.1  C) (Oral)   Resp 16   Wt 58.5 kg (129 lb)   SpO2 99%   BMI 20.82 kg/m    Body mass index is 20.82 kg/m .  Physical Exam   GENERAL: healthy, alert and no distress  HENT: ear canals and TM's normal, nose and mouth without ulcers or lesions  NECK: cervical adenopathy bilateral and anterior, no asymmetry, masses, or scars, thyroid normal to palpation and no exudates noted  RESP: lungs clear to auscultation - no rales, rhonchi or wheezes  ABDOMEN: soft, nontender, no hepatosplenomegaly, no masses and bowel sounds normal    Diagnostic Test Results:  Labs reviewed in Epic  Results for orders placed or performed in visit on 03/11/20 (from the past 24 hour(s))   Streptococcus A Rapid Scr w Reflx to PCR    Specimen: Throat   Result Value Ref Range    Strep Specimen Description Throat     Streptococcus Group A Rapid Screen Negative NEG^Negative   CBC with platelets differential   Result Value Ref Range    WBC 5.8 4.0 - 11.0 10e9/L    RBC Count 4.41 3.8 - 5.2 10e12/L    Hemoglobin 13.1 11.7 - 15.7 g/dL    Hematocrit 38.8 35.0 - 47.0 %    MCV 88 78 - 100 fl    MCH 29.7 26.5 - 33.0 pg    MCHC 33.8 31.5 - 36.5 g/dL    RDW 12.6 10.0 - 15.0 %     Platelet Count 194 150 - 450 10e9/L    % Neutrophils 69.5 %    % Lymphocytes 22.3 %    % Monocytes 7.1 %    % Eosinophils 0.9 %    % Basophils 0.2 %    Absolute Neutrophil 4.0 1.6 - 8.3 10e9/L    Absolute Lymphocytes 1.3 0.8 - 5.3 10e9/L    Absolute Monocytes 0.4 0.0 - 1.3 10e9/L    Absolute Eosinophils 0.1 0.0 - 0.7 10e9/L    Absolute Basophils 0.0 0.0 - 0.2 10e9/L    Diff Method Automated Method            Assessment & Plan     1. Throat pain  Likely viral given recent exposure  Supportive cares reviewed  - Streptococcus A Rapid Scr w Reflx to PCR  - Group A Streptococcus PCR Throat Swab  - CBC with platelets differential  - EBV Capsid Antibody IgM       See Patient Instructions    No follow-ups on file.    Brittani Ha MD  [facility]

## 2020-03-11 NOTE — PATIENT INSTRUCTIONS
Patient Education     Viral Pharyngitis (Sore Throat)    You or your child have pharyngitis (sore throat). This infection is caused by a virus. It can cause throat pain that is worse when swallowing, aching all over, headache, and fever. The infection may be spread by coughing, kissing, or touching others after touching your mouth or nose. Antibiotic medicines do not work against viruses. They are not used for treating this illness.  Home care    If symptoms are severe, you or your child should rest at home. Return to work or school when you or your child feel well enough.     You or your child should drink plenty of fluids to prevent dehydration.    Use throat lozenges or numbing throat sprays to help reduce pain. Gargling with warm salt water will also help reduce throat pain. Dissolve 1/2 teaspoon of salt in 1 glass of warm water. Children can sip on juice or a popsicle. Children 5 years and older can also suck on a lollipop or hard candy.    Don t eat salty or spicy foods or give them to your child. These can be irritating to the throat.  Medicines for a child: You can give your child acetaminophen for fever, fussiness, or discomfort. In babies over 6 months of age, you may use ibuprofen instead of acetaminophen. If your child has chronic liver or kidney disease or ever had a stomach ulcer or GI bleeding, talk with your child s healthcare provider before giving these medicines. Aspirin should never be used by any child under 18 years of age who has a fever. It may cause severe liver damage.  Medicines for an adult: You may use acetaminophen or ibuprofen to control pain or fever, unless another medicine was prescribed for this. If you have chronic liver or kidney disease or ever had a stomach ulcer or GI bleeding, talk with your healthcare provider before using these medicines.  Follow-up care  Follow up with a healthcare provider or our staff if you or your child are not getting better over the next  week.  When to seek medical advice  Call your healthcare provider right away if any of these occur:    Fever as directed by your healthcare provider.  For children, seek care if:  ? Your child is of any age and has repeated fevers above 104 F (40 C).  ? Your child is younger than 2 years of age and has a fever of 100.4 F (38 C) for more than 1 day.  ? Your child is 2 years old or older and has a fever of 100.4 F (38 C) for more than 3 days.    New or worsening ear pain, sinus pain, or headache    Painful lumps in the back of neck    Stiff neck    Lymph nodes are getting larger    Can t swallow liquids, a lot of drooling, or can t open mouth wide due to throat pain    Signs of dehydration, such as very dark urine or no urine, sunken eyes, dizziness    Trouble breathing or noisy breathing    Muffled voice    New rash    Other symptoms are getting worse  Date Last Reviewed: 10/1/2017    0514-2049 [employer]. [address] [place] [address]. All rights reserved. This information is not intended as a substitute for professional medical care. Always follow your healthcare professional's instructions.           Patient Education     Viral Pharyngitis (Sore Throat)    You or your child have pharyngitis (sore throat). This infection is caused by a virus. It can cause throat pain that is worse when swallowing, aching all over, headache, and fever. The infection may be spread by coughing, kissing, or touching others after touching your mouth or nose. Antibiotic medicines do not work against viruses. They are not used for treating this illness.  Home care    If symptoms are severe, you or your child should rest at home. Return to work or school when you or your child feel well enough.     You or your child should drink plenty of fluids to prevent dehydration.    Use throat lozenges or numbing throat sprays to help reduce pain. Gargling with warm salt water will also help reduce throat pain. Dissolve 1/2  teaspoon of salt in 1 glass of warm water. Children can sip on juice or a popsicle. Children 5 years and older can also suck on a lollipop or hard candy.    Don t eat salty or spicy foods or give them to your child. These can be irritating to the throat.  Medicines for a child: You can give your child acetaminophen for fever, fussiness, or discomfort. In babies over 6 months of age, you may use ibuprofen instead of acetaminophen. If your child has chronic liver or kidney disease or ever had a stomach ulcer or GI bleeding, talk with your child s healthcare provider before giving these medicines. Aspirin should never be used by any child under 18 years of age who has a fever. It may cause severe liver damage.  Medicines for an adult: You may use acetaminophen or ibuprofen to control pain or fever, unless another medicine was prescribed for this. If you have chronic liver or kidney disease or ever had a stomach ulcer or GI bleeding, talk with your healthcare provider before using these medicines.  Follow-up care  Follow up with a healthcare provider or our staff if you or your child are not getting better over the next week.  When to seek medical advice  Call your healthcare provider right away if any of these occur:    Fever as directed by your healthcare provider.  For children, seek care if:  ? Your child is of any age and has repeated fevers above 104 F (40 C).  ? Your child is younger than 2 years of age and has a fever of 100.4 F (38 C) for more than 1 day.  ? Your child is 2 years old or older and has a fever of 100.4 F (38 C) for more than 3 days.    New or worsening ear pain, sinus pain, or headache    Painful lumps in the back of neck    Stiff neck    Lymph nodes are getting larger    Can t swallow liquids, a lot of drooling, or can t open mouth wide due to throat pain    Signs of dehydration, such as very dark urine or no urine, sunken eyes, dizziness    Trouble breathing or noisy breathing    Muffled  voice    New rash    Other symptoms are getting worse  Date Last Reviewed: 10/1/2017    4293-7258 [employer]. [address] [place] [address], [place] [address]. All rights reserved. This information is not intended as a substitute for professional medical care. Always follow your healthcare professional's instructions.           Patient Education     When You Have a Sore Throat    A sore throat can be painful. There are many reasons why you may have a sore throat. Your healthcare provider will work with you to find the cause of your sore throat. He or she will also find the best treatment for you.  What causes a sore throat?  Sore throats can be caused or worsened by:    Cold or flu viruses    Bacteria    Irritants such as tobacco smoke or air pollution    Acid reflux  A healthy throat  The tonsils are on the sides of the throat near the base of the tongue. They collect viruses and bacteria and help fight infection. The throat (pharynx) is the passage for air. Mucus from the nasal cavity also moves down the passage.  An inflamed throat  The tonsils and pharynx can become inflamed due to a cold or flu virus. Postnasal drip (excess mucus draining from the nasal cavity) can irritate the throat. It can also make the throat or tonsils more likely to be infected by bacteria. Severe, untreated tonsillitis in children or adults can cause a pocket of pus (abscess) to form near the tonsil.  Your evaluation  A medical evaluation can help find the cause of your sore throat. It can also help your healthcare provider choose the best treatment for you. The evaluation may include a health history, physical exam, and diagnostic tests.  Health history  Your healthcare provider may ask you the following:    How long has the sore throat lasted and how have you been treating it?    Do you have any other symptoms, such as body aches, fever, or cough?    Does your sore throat recur? If so, how often? How many days of school or work  "have you missed because of a sore throat?    Do you have trouble eating or swallowing?    Have you been told that you snore or have other sleep problems?    Do you have bad breath?    Do you cough up bad-tasting mucus?  Physical exam  During the exam, your healthcare provider checks your ears, nose, and throat for problems. He or she also checks for swelling in the neck, and may listen to your chest.  Possible tests  Other tests your healthcare provider may perform include:    A throat swab to check for bacteria such as streptococcus (the bacteria that causes strep throat)    A blood test to check for mononucleosis (a viral infection)    A chest X-ray to rule out pneumonia, especially if you have a cough  Treating a sore throat  Treatment depends on many factors. What is the likely cause? Is the problem recent? Does it keep coming back? In many cases, the best thing to do is to treat the symptoms, rest, and let the problem heal itself. Antibiotics may help clear up some bacterial infections. For cases of severe or recurring tonsillitis, the tonsils may need to be removed.  Relieving your symptoms    Don t smoke, and avoid secondhand smoke.    For children, try throat sprays or Popsicles. Adults and older children may try lozenges.    Drink warm liquids to soothe the throat and help thin mucus. Avoid alcohol, spicy foods, and acidic drinks such as orange juice. These can irritate the throat.    Gargle with warm saltwater (1 teaspoon of salt to 8 ounces of warm water).    Use a humidifier to keep air moist and relieve throat dryness.    Try over-the-counter pain relievers such as acetaminophen or ibuprofen. Use as directed, and don t exceed the recommended dose. Don t give aspirin to children.   Are antibiotics needed?  If your sore throat is due to a bacterial infection, antibiotics may speed healing and prevent complications. Although group A streptococcus (\"strep throat\" or GAS) is the major treatable infection for " a sore throat, GAS causes only 5% to 15% of sore throats in adults who seek medical care. Most sore throats are caused by cold or flu viruses. And antibiotics don t treat viral illness. In fact, using antibiotics when they re not needed may produce bacteria that are harder to kill. Your healthcare provider will prescribe antibiotics only if he or she thinks they are likely to help.  If antibiotics are prescribed  Take the medicine exactly as directed. Be sure to finish your prescription even if you re feeling better. And be sure to ask your healthcare provider or pharmacist what side effects are common and what to do about them.  Is surgery needed?  In some cases, tonsils need to be removed. This is often done as outpatient (same-day) surgery. Your healthcare provider may advise removing the tonsils in cases of:    Several severe bouts of tonsillitis in a year.  Severe  episodes include those that lead to missed days of school or work, or that need to be treated with antibiotics.    Tonsillitis that causes breathing problems during sleep    Tonsillitis caused by food particles collecting in pouches in the tonsils (cryptic tonsillitis)  Call your healthcare provider if any of the following occur:    Symptoms worsen, or new symptoms develop.    Swollen tonsils make breathing difficult.    The pain is severe enough to keep you from drinking liquids.    A skin rash, hives, or wheezing develops. Any of these could signal an allergic reaction to antibiotics.    Symptoms don t improve within a week.    Symptoms don t improve within 2 to 3 days of starting antibiotics.   Date Last Reviewed: 10/1/2016    9849-4196 [employer]. [address] [place] [address], [place] [address]. All rights reserved. This information is not intended as a substitute for professional medical care. Always follow your healthcare professional's instructions.

## 2020-03-12 LAB — EBV VCA IGM SER QL IA: 0.7 AI (ref 0–0.8)

## 2020-03-16 NOTE — RESULT ENCOUNTER NOTE
Hello,    Great news, your results were normal. The labs did not show any signs of a bacterial infection nor does it appear to be mononucleosis.  It is therefore likely another viral illness like possible Cytomegalovirus which tends to run its course and has no treatment.  I would recommend drinking lots of hot fluids, gargling with warm salt water a few times throughout the day and use ibuprofen 600-800 mg as needed for pain.  If this is not starting to get better or resolving in 5-7 days then please let me know.    Brittani Ha MD

## 2020-09-01 ENCOUNTER — HOSPITAL ENCOUNTER (OUTPATIENT)
Dept: CT IMAGING | Facility: CLINIC | Age: 35
Discharge: HOME | End: 2020-09-01
Attending: INTERNAL MEDICINE | Admitting: INTERNAL MEDICINE
Payer: COMMERCIAL

## 2020-09-01 ENCOUNTER — OFFICE VISIT (OUTPATIENT)
Dept: INTERNAL MEDICINE | Facility: CLINIC | Age: 35
End: 2020-09-01
Attending: INTERNAL MEDICINE
Payer: COMMERCIAL

## 2020-09-01 VITALS
HEIGHT: 66 IN | BODY MASS INDEX: 20.73 KG/M2 | DIASTOLIC BLOOD PRESSURE: 86 MMHG | WEIGHT: 129 LBS | HEART RATE: 69 BPM | SYSTOLIC BLOOD PRESSURE: 119 MMHG

## 2020-09-01 DIAGNOSIS — R10.31 RIGHT LOWER QUADRANT PAIN: Primary | ICD-10-CM

## 2020-09-01 DIAGNOSIS — R10.31 RIGHT LOWER QUADRANT PAIN: ICD-10-CM

## 2020-09-01 LAB
ANION GAP SERPL CALCULATED.3IONS-SCNC: 5 MMOL/L (ref 3–14)
BUN SERPL-MCNC: 11 MG/DL (ref 7–30)
CALCIUM SERPL-MCNC: 8.8 MG/DL (ref 8.5–10.1)
CHLORIDE SERPL-SCNC: 104 MMOL/L (ref 94–109)
CO2 SERPL-SCNC: 29 MMOL/L (ref 20–32)
CREAT SERPL-MCNC: 0.7 MG/DL (ref 0.52–1.04)
GFR SERPL CREATININE-BSD FRML MDRD: >90 ML/MIN/{1.73_M2}
GLUCOSE SERPL-MCNC: 86 MG/DL (ref 70–99)
POTASSIUM SERPL-SCNC: 4.1 MMOL/L (ref 3.4–5.3)
SODIUM SERPL-SCNC: 138 MMOL/L (ref 133–144)

## 2020-09-01 PROCEDURE — 25000125 ZZHC RX 250: Performed by: INTERNAL MEDICINE

## 2020-09-01 PROCEDURE — 36415 COLL VENOUS BLD VENIPUNCTURE: CPT | Performed by: INTERNAL MEDICINE

## 2020-09-01 PROCEDURE — 74177 CT ABD & PELVIS W/CONTRAST: CPT

## 2020-09-01 PROCEDURE — 25000128 H RX IP 250 OP 636: Performed by: INTERNAL MEDICINE

## 2020-09-01 PROCEDURE — 80048 BASIC METABOLIC PNL TOTAL CA: CPT | Performed by: INTERNAL MEDICINE

## 2020-09-01 PROCEDURE — G0463 HOSPITAL OUTPT CLINIC VISIT: HCPCS | Mod: ZF

## 2020-09-01 RX ORDER — IOPAMIDOL 755 MG/ML
100 INJECTION, SOLUTION INTRAVASCULAR ONCE
Status: COMPLETED | OUTPATIENT
Start: 2020-09-01 | End: 2020-09-01

## 2020-09-01 RX ADMIN — IOPAMIDOL 64 ML: 755 INJECTION, SOLUTION INTRAVENOUS at 15:09

## 2020-09-01 RX ADMIN — SODIUM CHLORIDE 56 ML: 9 INJECTION, SOLUTION INTRAVENOUS at 15:10

## 2020-09-01 ASSESSMENT — ENCOUNTER SYMPTOMS
BACK PAIN: 0
DYSPNEA ON EXERTION: 0
VOMITING: 0
COUGH: 0
ARTHRALGIAS: 0
DIARRHEA: 0
DECREASED CONCENTRATION: 0
POLYPHAGIA: 0
NAUSEA: 0
INSOMNIA: 0
SWOLLEN GLANDS: 0
DEPRESSION: 0
ALTERED TEMPERATURE REGULATION: 0
POLYDIPSIA: 0
FEVER: 0
CONSTIPATION: 0
PANIC: 0
FATIGUE: 0
BRUISES/BLEEDS EASILY: 0
ABDOMINAL PAIN: 1
NERVOUS/ANXIOUS: 0

## 2020-09-01 ASSESSMENT — MIFFLIN-ST. JEOR: SCORE: 1311.89

## 2020-09-01 ASSESSMENT — PATIENT HEALTH QUESTIONNAIRE - PHQ9
SUM OF ALL RESPONSES TO PHQ QUESTIONS 1-9: 4
5. POOR APPETITE OR OVEREATING: SEVERAL DAYS

## 2020-09-01 ASSESSMENT — ANXIETY QUESTIONNAIRES
5. BEING SO RESTLESS THAT IT IS HARD TO SIT STILL: NOT AT ALL
GAD7 TOTAL SCORE: 6
2. NOT BEING ABLE TO STOP OR CONTROL WORRYING: SEVERAL DAYS
3. WORRYING TOO MUCH ABOUT DIFFERENT THINGS: SEVERAL DAYS
6. BECOMING EASILY ANNOYED OR IRRITABLE: SEVERAL DAYS
7. FEELING AFRAID AS IF SOMETHING AWFUL MIGHT HAPPEN: SEVERAL DAYS
1. FEELING NERVOUS, ANXIOUS, OR ON EDGE: SEVERAL DAYS

## 2020-09-01 ASSESSMENT — PAIN SCALES - GENERAL: PAINLEVEL: NO PAIN (0)

## 2020-09-01 NOTE — PROGRESS NOTES
HPI  Patient reports that she has been dealing with right lower pelvic pain and pressure. She states that she developed symptoms about 6 months ago. She states that it was not constant in the beginning. She is able to exercise without any issues. She feels extra pressure in the area when she is moving her bowels. She denies blood in the stool. She denies issues with diarrhea or constipation.     Review of Systems     Constitutional:  Negative for fever and fatigue.   Respiratory:   Negative for cough and dyspnea on exertion.    Cardiovascular:  Negative for chest pain, dyspnea on exertion and edema.   Gastrointestinal:  Positive for abdominal pain. Negative for nausea, vomiting, diarrhea, constipation and melena.   Musculoskeletal:  Negative for back pain, arthralgias and bone pain.   Skin:  Negative for itching and rash.   Endo/Heme:  Negative for anemia, swollen glands and bruises/bleeds easily.   Psychiatric/Behavioral:  Negative for depression, decreased concentration, mood swings and panic attacks.    Endocrine:  Negative for altered temperature regulation, polyphagia, polydipsia, unwanted hair growth and change in facial hair.    Current Outpatient Medications   Medication     naratriptan (AMERGE) 1 MG tablet     Omega-3 Fatty Acids (OMEGA 3 500 PO)     Prenatal Vit-Fe Fumarate-FA (PRENATAL MULTIVITAMIN PLUS IRON) 27-0.8 MG TABS per tablet     rizatriptan (MAXALT) 10 MG tablet     VITAMIN D, CHOLECALCIFEROL, PO     No current facility-administered medications for this visit.      Past Medical History:   Diagnosis Date     Migraines     without aura     Past Surgical History:   Procedure Laterality Date     ANKLE SURGERY  2008     COLONOSCOPY  2009    normal result; done due to digestive issues     HERNIA REPAIR  1994     Family History   Problem Relation Age of Onset     Lung Cancer Paternal Grandmother         nonsmoker     Breast Cancer Paternal Grandmother         over age 50     Alzheimer Disease Paternal  Grandfather      Colon Cancer Paternal Aunt      Coronary Artery Disease Early Onset No family hx of      Hypertension No family hx of      Hyperlipidemia No family hx of      Diabetes No family hx of      Melanoma No family hx of      Skin Cancer No family hx of      Social History     Socioeconomic History     Marital status:      Spouse name: John     Number of children: Not on file     Years of education: Not on file     Highest education level: Not on file   Occupational History     Not on file   Social Needs     Financial resource strain: Not on file     Food insecurity     Worry: Not on file     Inability: Not on file     Transportation needs     Medical: Not on file     Non-medical: Not on file   Tobacco Use     Smoking status: Never Smoker     Smokeless tobacco: Never Used   Substance and Sexual Activity     Alcohol use: No     Alcohol/week: 2.0 standard drinks     Comment: 3-5 drinks/week     Drug use: No     Sexual activity: Yes     Partners: Male     Birth control/protection: Pull-out method   Lifestyle     Physical activity     Days per week: Not on file     Minutes per session: Not on file     Stress: Not on file   Relationships     Social connections     Talks on phone: Not on file     Gets together: Not on file     Attends Rastafarian service: Not on file     Active member of club or organization: Not on file     Attends meetings of clubs or organizations: Not on file     Relationship status: Not on file     Intimate partner violence     Fear of current or ex partner: Not on file     Emotionally abused: Not on file     Physically abused: Not on file     Forced sexual activity: Not on file   Other Topics Concern     Parent/sibling w/ CABG, MI or angioplasty before 65F 55M? No   Social History Narrative    Lives with . Just moved to [place] from [place] area where she finished grad school.  She now has a new job and works remotely for a company in [place].  Her  took a job here in [place]  "and he is from [place].        Has a good support system.    Feels safe in all environments.    Wears seatbelt 100% of the time    Wears helmet while biking.    Denies history of abuse, past or present, physical, sexual or emotional.    Kimberly Razo PA-C    01/29/18            .       Vitals:    09/01/20 1110   BP: 119/86   Pulse: 69   Weight: 58.5 kg (129 lb)   Height: 1.676 m (5' 6\")         Physical Exam  Vitals signs and nursing note reviewed.   Constitutional:       Appearance: Normal appearance.   HENT:      Head: Normocephalic and atraumatic.      Mouth/Throat:      Mouth: Mucous membranes are moist.   Eyes:      Pupils: Pupils are equal, round, and reactive to light.   Neck:      Musculoskeletal: Normal range of motion.   Cardiovascular:      Rate and Rhythm: Normal rate.   Pulmonary:      Effort: Pulmonary effort is normal.   Abdominal:      General: Abdomen is flat. There is no distension.      Palpations: Abdomen is soft. There is no mass.      Tenderness: There is abdominal tenderness (right lower quadrant tenderness on palpation). There is no guarding or rebound.      Hernia: No hernia is present.   Musculoskeletal: Normal range of motion.         General: No edema.   Skin:     General: Skin is warm and dry.   Neurological:      General: No focal deficit present.      Mental Status: She is alert and oriented to person, place, and time.   Psychiatric:         Mood and Affect: Mood normal.         Behavior: Behavior normal.         Thought Content: Thought content normal.         Judgment: Judgment normal.       Assessment and Plan:  Sherita was seen today for establish care.    Diagnoses and all orders for this visit:    Right lower quadrant pain. Discussed possible causes with patient. Advised on approach to diagnosis, recommend imaging. Patient will be advised on test results accordingly.   -     Basic metabolic panel  -     Cancel: CT Abdomen Pelvis w/o & w Contrast; Future  -     CT Abdomen Pelvis w " Contrast; Future        Total time spent 30  minutes.  More than 50% of the time spent with Ms. Glasgow on counseling / coordinating her care    Brittney Godoy MD

## 2020-09-01 NOTE — LETTER
9/1/2020       RE: Sherita Glasgow  [address]  [facility] [address]     Dear Colleague,    Thank you for referring your patient, Sherita Glasgow, to the [facility]  at [facility]. Please see a copy of my visit note below.    HPI  Patient reports that she has been dealing with right lower pelvic pain and pressure. She states that she developed symptoms about 6 months ago. She states that it was not constant in the beginning. She is able to exercise without any issues. She feels extra pressure in the area when she is moving her bowels. She denies blood in the stool. She denies issues with diarrhea or constipation.     Review of Systems     Constitutional:  Negative for fever and fatigue.   Respiratory:   Negative for cough and dyspnea on exertion.    Cardiovascular:  Negative for chest pain, dyspnea on exertion and edema.   Gastrointestinal:  Positive for abdominal pain. Negative for nausea, vomiting, diarrhea, constipation and melena.   Musculoskeletal:  Negative for back pain, arthralgias and bone pain.   Skin:  Negative for itching and rash.   Endo/Heme:  Negative for anemia, swollen glands and bruises/bleeds easily.   Psychiatric/Behavioral:  Negative for depression, decreased concentration, mood swings and panic attacks.    Endocrine:  Negative for altered temperature regulation, polyphagia, polydipsia, unwanted hair growth and change in facial hair.    Current Outpatient Medications   Medication     naratriptan (AMERGE) 1 MG tablet     Omega-3 Fatty Acids (OMEGA 3 500 PO)     Prenatal Vit-Fe Fumarate-FA (PRENATAL MULTIVITAMIN PLUS IRON) 27-0.8 MG TABS per tablet     rizatriptan (MAXALT) 10 MG tablet     VITAMIN D, CHOLECALCIFEROL, PO     No current facility-administered medications for this visit.      Past Medical History:   Diagnosis Date     Migraines     without aura     Past Surgical History:   Procedure Laterality Date     ANKLE  SURGERY  2008     COLONOSCOPY  2009    normal result; done due to digestive issues     HERNIA REPAIR  1994     Family History   Problem Relation Age of Onset     Lung Cancer Paternal Grandmother         nonsmoker     Breast Cancer Paternal Grandmother         over age 50     Alzheimer Disease Paternal Grandfather      Colon Cancer Paternal Aunt      Coronary Artery Disease Early Onset No family hx of      Hypertension No family hx of      Hyperlipidemia No family hx of      Diabetes No family hx of      Melanoma No family hx of      Skin Cancer No family hx of      Social History     Socioeconomic History     Marital status:      Spouse name: John     Number of children: Not on file     Years of education: Not on file     Highest education level: Not on file   Occupational History     Not on file   Social Needs     Financial resource strain: Not on file     Food insecurity     Worry: Not on file     Inability: Not on file     Transportation needs     Medical: Not on file     Non-medical: Not on file   Tobacco Use     Smoking status: Never Smoker     Smokeless tobacco: Never Used   Substance and Sexual Activity     Alcohol use: No     Alcohol/week: 2.0 standard drinks     Comment: 3-5 drinks/week     Drug use: No     Sexual activity: Yes     Partners: Male     Birth control/protection: Pull-out method   Lifestyle     Physical activity     Days per week: Not on file     Minutes per session: Not on file     Stress: Not on file   Relationships     Social connections     Talks on phone: Not on file     Gets together: Not on file     Attends [employer]ology service: Not on file     Active member of club or organization: Not on file     Attends meetings of clubs or organizations: Not on file     Relationship status: Not on file     Intimate partner violence     Fear of current or ex partner: Not on file     Emotionally abused: Not on file     Physically abused: Not on file     Forced sexual activity: Not on file   Other  "Topics Concern     Parent/sibling w/ CABG, MI or angioplasty before 65F 55M? No   Social History Narrative    Lives with . Just moved to [place] from [place] area where she finished grad school.  She now has a new job and works remotely for a company in [place].  Her  took a job here in [place] and he is from [place].        Has a good support system.    Feels safe in all environments.    Wears seatbelt 100% of the time    Wears helmet while biking.    Denies history of abuse, past or present, physical, sexual or emotional.    Kimberly Razo PA-C    01/29/18            .       Vitals:    09/01/20 1110   BP: 119/86   Pulse: 69   Weight: 58.5 kg (129 lb)   Height: 1.676 m (5' 6\")         Physical Exam  Vitals signs and nursing note reviewed.   Constitutional:       Appearance: Normal appearance.   HENT:      Head: Normocephalic and atraumatic.      Mouth/Throat:      Mouth: Mucous membranes are moist.   Eyes:      Pupils: Pupils are equal, round, and reactive to light.   Neck:      Musculoskeletal: Normal range of motion.   Cardiovascular:      Rate and Rhythm: Normal rate.   Pulmonary:      Effort: Pulmonary effort is normal.   Abdominal:      General: Abdomen is flat. There is no distension.      Palpations: Abdomen is soft. There is no mass.      Tenderness: There is abdominal tenderness (right lower quadrant tenderness on palpation). There is no guarding or rebound.      Hernia: No hernia is present.   Musculoskeletal: Normal range of motion.         General: No edema.   Skin:     General: Skin is warm and dry.   Neurological:      General: No focal deficit present.      Mental Status: She is alert and oriented to person, place, and time.   Psychiatric:         Mood and Affect: Mood normal.         Behavior: Behavior normal.         Thought Content: Thought content normal.         Judgment: Judgment normal.       Assessment and Plan:  Sherita was seen today for establish care.    Diagnoses and all orders for " this visit:    Right lower quadrant pain. Discussed possible causes with patient. Advised on approach to diagnosis, recommend imaging. Patient will be advised on test results accordingly.   -     Basic metabolic panel  -     Cancel: CT Abdomen Pelvis w/o & w Contrast; Future  -     CT Abdomen Pelvis w Contrast; Future        Total time spent 30  minutes.  More than 50% of the time spent with Ms. Glasgow on counseling / coordinating her care    Brittney Godoy MD

## 2020-09-02 ENCOUNTER — TELEPHONE (OUTPATIENT)
Dept: OBGYN | Facility: CLINIC | Age: 35
End: 2020-09-02

## 2020-09-02 DIAGNOSIS — N83.209 OVARIAN CYST: Primary | ICD-10-CM

## 2020-09-02 ASSESSMENT — ANXIETY QUESTIONNAIRES: GAD7 TOTAL SCORE: 6

## 2020-09-02 NOTE — TELEPHONE ENCOUNTER
Discussed Dr. Godoy's recommendation to schedule US in 6 weeks. Answered pts questions and scheduled US and visit with OBGYN for f/u as pt noted may wish to discuss conception in near future

## 2020-09-02 NOTE — TELEPHONE ENCOUNTER
----- Message from Jaxson Moreno sent at 9/2/2020  1:25 PM CDT -----  Regarding: Results, CT 9/1/20, [facility]    Phone Message    May a detailed message be left on voicemail: no     Reason for Call: Requesting Results   Name/type of test: CT Abdomen Pelvis  Date of test: 9/1/20  Was test done at a location other than [facility] (Please fill in the location if not [facility])?: No  Comments: Pt would like a call back to discuss results and plan of care. Please call Pt back.      Action Taken: Message routed to:  Other: MUNA RN [facility]    Travel Screening: Not Applicable

## 2020-10-06 ENCOUNTER — OFFICE VISIT (OUTPATIENT)
Dept: OBGYN | Facility: CLINIC | Age: 35
End: 2020-10-06
Attending: OBSTETRICS & GYNECOLOGY
Payer: COMMERCIAL

## 2020-10-06 ENCOUNTER — ANCILLARY PROCEDURE (OUTPATIENT)
Dept: ULTRASOUND IMAGING | Facility: CLINIC | Age: 35
End: 2020-10-06
Attending: INTERNAL MEDICINE
Payer: COMMERCIAL

## 2020-10-06 VITALS
HEART RATE: 74 BPM | DIASTOLIC BLOOD PRESSURE: 74 MMHG | BODY MASS INDEX: 21.13 KG/M2 | HEIGHT: 66 IN | SYSTOLIC BLOOD PRESSURE: 120 MMHG | WEIGHT: 131.5 LBS

## 2020-10-06 DIAGNOSIS — N83.209 CYST OF OVARY, UNSPECIFIED LATERALITY: Primary | ICD-10-CM

## 2020-10-06 DIAGNOSIS — N83.209 OVARIAN CYST: ICD-10-CM

## 2020-10-06 PROCEDURE — 76830 TRANSVAGINAL US NON-OB: CPT | Mod: 26 | Performed by: OBSTETRICS & GYNECOLOGY

## 2020-10-06 PROCEDURE — G0463 HOSPITAL OUTPT CLINIC VISIT: HCPCS

## 2020-10-06 PROCEDURE — 99212 OFFICE O/P EST SF 10 MIN: CPT | Mod: 25 | Performed by: OBSTETRICS & GYNECOLOGY

## 2020-10-06 PROCEDURE — 76830 TRANSVAGINAL US NON-OB: CPT

## 2020-10-06 ASSESSMENT — MIFFLIN-ST. JEOR: SCORE: 1318.23

## 2020-10-06 NOTE — LETTER
"10/6/2020       RE: Sherita Glasgow  [address]  [facility] [address]     Dear Colleague,    Thank you for referring your patient, Sherita Glasgow, to the [facility] [place] at [facility]. Please see a copy of my visit note below.    [facility]  Follow-Up Visit    S: Ms. Sherita Glasgow is a 35 year old  here for follow up. She was seen by her primary care physician for right lower quadrant pain. She underwent CT which showed a 2.5cm ovarian cyst possible consistent with a corpus luteum. She was therefore encouraged to follow up with Ob/Gyn. She had a repeat pelvic US performed today.    She notes that since her previous visit her pain has gone away. She denies any vaginal bleeding or discharge, pain with intercourse. No fevers, chills, nausea/vomiting, urinary symptoms or constipation/diarrhea.    O:  /74 (BP Location: Left arm, Patient Position: Chair)   Pulse 74   Ht 1.676 m (5' 6\")   Wt 59.6 kg (131 lb 8 oz)   BMI 21.22 kg/m    Gen: Well-appearing, NAD  HEENT: Normocephalic, atraumatic  CV:  Regular rate, well-perfused  Pulm: nonlabored breathing  Abd: Soft, non-tender, non-distended, no rebound or guarding  Ext: No LE edema, extremities warm and well perfused    US Transvaginal Non OB  35 year old female presents for gynecologic ultrasound indicated by f/u   ovarian cyst.  This study was done transvaginally.     Uterine findings:              Presence: Visible Size: Normal 4.6 x 5.4 x 0.4 cm.    Endometrium = 6.7 mm.              Cx length = 23.2 mm.                            Flexion:  Anteverted    Position: Midline          Margins:   Smooth         Shape: Normal              Contour: Regular        Texture: Homogeneous          Cavity:   Normal            Masses: Normal     Pelvic findings:               Right Adnexa: Normal              Left Adnexa: Normal              Bladder:  Normal                      "                                      Cul - de - sac fluid: small amount of fluid     Ovarian follicles:              Right ovary:  3.3 x 3.4 x 1.3cm.                 Multiple follicles                 Size(s):  <1cm                 Left ovary:  3.2 x 2.5 x 1.9cm.                 Multiple follicles                 Size(s):  <1cm        Comments:  No sonographic abnormalities noted on pelvic ultrasound.        Lili Cote, MS   Farzaneh Lau MD           A/P:  Ms. Sherita Glasgow is a 35 year old  here for follow up of CT scan that showed a 2.5cm ovarian cyst. Repeat pelvic US today shows resolution of cyst. Discused that cysts are common in premenopausal women and likely this was a physiologic cyst related to ovulation (corpus luteum). Patient relieved to heard this information. Wondering if there is any concern about trying to conceive after this. Discussed there are no concerns, this is physiologic and likely a good sign she is ovulating.   - Return PRN, start prenatal vitamin if contemplating pregnancy  - Pap up to date (2018 NIL) next pap w/ cotesting 2021 then q5year testing if normal results    Patient discussed with Dr Sid Soto MD  Obstetrics and Gyncology, PGY-4  2020 , 2:19 PM     I agree with note as above. The patient was seen in continuity clinic by the resident doctor.  Assessment and plan were jointly made.  Farzaneh Lau MD

## 2020-10-06 NOTE — PROGRESS NOTES
"[facility]  Follow-Up Visit    S: Ms. Sherita Glasgow is a 35 year old  here for follow up. She was seen by her primary care physician for right lower quadrant pain. She underwent CT which showed a 2.5cm ovarian cyst possible consistent with a corpus luteum. She was therefore encouraged to follow up with Ob/Gyn. She had a repeat pelvic US performed today.    She notes that since her previous visit her pain has gone away. She denies any vaginal bleeding or discharge, pain with intercourse. No fevers, chills, nausea/vomiting, urinary symptoms or constipation/diarrhea.    O:  /74 (BP Location: Left arm, Patient Position: Chair)   Pulse 74   Ht 1.676 m (5' 6\")   Wt 59.6 kg (131 lb 8 oz)   BMI 21.22 kg/m    Gen: Well-appearing, NAD  HEENT: Normocephalic, atraumatic  CV:  Regular rate, well-perfused  Pulm: nonlabored breathing  Abd: Soft, non-tender, non-distended, no rebound or guarding  Ext: No LE edema, extremities warm and well perfused    US Transvaginal Non OB  35 year old female presents for gynecologic ultrasound indicated by f/u   ovarian cyst.  This study was done transvaginally.     Uterine findings:              Presence: Visible Size: Normal 4.6 x 5.4 x 0.4 cm.    Endometrium = 6.7 mm.              Cx length = 23.2 mm.                            Flexion:  Anteverted    Position: Midline          Margins:   Smooth         Shape: Normal              Contour: Regular        Texture: Homogeneous          Cavity:   Normal            Masses: Normal     Pelvic findings:               Right Adnexa: Normal              Left Adnexa: Normal              Bladder:  Normal                                                           Cul - de - sac fluid: small amount of fluid     Ovarian follicles:              Right ovary:  3.3 x 3.4 x 1.3cm.                 Multiple follicles                 Size(s):  <1cm                 Left ovary:  3.2 x 2.5 x 1.9cm.                 Multiple follicles                 " Size(s):  <1cm        Comments:  No sonographic abnormalities noted on pelvic ultrasound.        Lili Cote, [facility]   Farzaneh Lau MD           A/P:  Ms. Sherita Glasgow is a 35 year old  here for follow up of CT scan that showed a 2.5cm ovarian cyst. Repeat pelvic US today shows resolution of cyst. Discused that cysts are common in premenopausal women and likely this was a physiologic cyst related to ovulation (corpus luteum). Patient relieved to heard this information. Wondering if there is any concern about trying to conceive after this. Discussed there are no concerns, this is physiologic and likely a good sign she is ovulating.   - Return PRN, start prenatal vitamin if contemplating pregnancy  - Pap up to date (2018 NIL) next pap w/ cotesting 2021 then q5year testing if normal results    Patient discussed with Dr Sid Soto MD  Obstetrics and Gyncology, PGY-4  2020 , 2:19 PM     I agree with note as above. The patient was seen in continuity clinic by the resident doctor.  Assessment and plan were jointly made.  Farzaneh Lau MD

## 2020-12-15 ENCOUNTER — OFFICE VISIT (OUTPATIENT)
Dept: OPHTHALMOLOGY | Facility: CLINIC | Age: 35
End: 2020-12-15
Payer: COMMERCIAL

## 2020-12-15 DIAGNOSIS — H52.13 MYOPIA OF BOTH EYES: Primary | ICD-10-CM

## 2020-12-15 PROCEDURE — 92004 COMPRE OPH EXAM NEW PT 1/>: CPT | Performed by: OPTOMETRIST

## 2020-12-15 PROCEDURE — 92015 DETERMINE REFRACTIVE STATE: CPT | Performed by: OPTOMETRIST

## 2020-12-15 PROCEDURE — 92310 CONTACT LENS FITTING OU: CPT | Performed by: OPTOMETRIST

## 2020-12-15 RX ORDER — RIZATRIPTAN BENZOATE 10 MG/1
TABLET ORAL
COMMUNITY
Start: 2020-01-10 | End: 2021-06-10

## 2020-12-15 ASSESSMENT — CONF VISUAL FIELD
OS_NORMAL: 1
OD_NORMAL: 1
METHOD: COUNTING FINGERS

## 2020-12-15 ASSESSMENT — TONOMETRY
OS_IOP_MMHG: 12
OD_IOP_MMHG: 14
IOP_METHOD: TONOPEN

## 2020-12-15 ASSESSMENT — REFRACTION_MANIFEST
OD_SPHERE: -5.00
OS_CYLINDER: SPHERE
OD_CYLINDER: SPHERE
OS_SPHERE: -4.00

## 2020-12-15 ASSESSMENT — REFRACTION_CURRENTRX
OD_BRAND: BIOFINITY
OS_DIAMETER: 14.0
OD_BASECURVE: 8.6
OS_BASECURVE: 8.6
OS_SPHERE: -4.00
OD_SPHERE: -5.00
OS_BRAND: BIOFINITY
OD_DIAMETER: 14.0

## 2020-12-15 ASSESSMENT — EXTERNAL EXAM - RIGHT EYE: OD_EXAM: NORMAL

## 2020-12-15 ASSESSMENT — SLIT LAMP EXAM - LIDS
COMMENTS: NORMAL
COMMENTS: NORMAL

## 2020-12-15 ASSESSMENT — VISUAL ACUITY
OD_CC+: -1
OD_CC: 20/20 SLOW
METHOD: SNELLEN - LINEAR
OS_CC: 20/20
CORRECTION_TYPE: CONTACTS

## 2020-12-15 ASSESSMENT — CUP TO DISC RATIO
OD_RATIO: 0.3
OS_RATIO: 0.3

## 2020-12-15 ASSESSMENT — EXTERNAL EXAM - LEFT EYE: OS_EXAM: NORMAL

## 2020-12-15 NOTE — NURSING NOTE
Chief Complaint(s) and History of Present Illness(es)     COMPREHENSIVE EYE EXAM     In both eyes.  Associated symptoms include dryness.  Negative for eye pain, redness and tearing.  Pain was noted as 0/10.              Comments     New Complete eye exam BE and for Contact lenses. Pt denies any changes in vision with CL and glasses. Pt notes she is happy with the CL brand she is currently wearing. Some dryness usually in the evening after wearing CL for 14+ hours a day. Pt states she frequently has to take out and put back in the LE CL.      Monthly CLs  AWT: 14+ hours/ day  Takes out CL every night  Brand CL ?  Solution Optifree/Ana M  Ocular meds: None    ESTUARDO Swanson 2:06 PM December 15, 2020

## 2020-12-15 NOTE — PROGRESS NOTES
History  HPI     COMPREHENSIVE EYE EXAM     In both eyes.  Associated symptoms include dryness.  Negative for eye pain, redness and tearing.  Pain was noted as 0/10.              Comments     New Complete eye exam BE and for Contact lenses. Pt denies any changes in vision with CL and glasses. Pt notes she is happy with the CL brand she is currently wearing. Some dryness usually in the evening after wearing CL for 14+ hours a day. Pt states she frequently has to take out and put back in the LE CL.      Monthly CLs  AWT: 14+ hours/ day  Takes out CL every night  Brand CL ?  Solution Optifree/Ana M  Ocular meds: None    ESTUARDO Swanson 2:06 PM December 15, 2020             Last edited by Jacqueline Allen COMT on 12/15/2020  2:12 PM. (History)          Assessment/Plan  (H52.13) Myopia of both eyes  (primary encounter diagnosis)  Comment: Myopia both eyes, happy with contact lenses  Plan: REFRACTION, NM CONTACT LENS FITTING COSMETIC LVL 1 - ADULT         Educated patient on condition and clinical findings. Dispensed spectacle prescription for full time wear. Monitor annually.   Dispensed trial lenses and finalized contact lens prescription for 2 years. Maintained current contact lens power as she is happy and habitually over-minused by one quarter of a diopter.    Return to clinic in 1 year for comprehensive eye exam.    Contact Lens Billing  V-Code:  - Soft spherical  Final Contact Lens Rx       Brand Base Curve Diameter Sphere    Right Biofinity 8.6 14.0 -5.00    Left Biofinity 8.6 14.0 -4.00    Expiration Date: 12/16/2022    Replacement: Monthly         CL Fitting Fee: $75    These are for cosmetic contact lenses.    Encounter Diagnosis   Name Primary?     Myopia of both eyes Yes      Complete documentation of historical and exam elements from today's encounter can  be found in the full encounter summary report (not reduplicated in this progress  note). I personally obtained the chief complaint(s) and history of  present illness. I  confirmed and edited as necessary the review of systems, past medical/surgical  history, family history, social history, and examination findings as documented by  others; and I examined the patient myself. I personally reviewed the relevant tests,  images, and reports as documented above. I formulated and edited as necessary the  assessment and plan and discussed the findings and management plan with the  patient and family.    Ceasar Donald OD, FAAO

## 2021-01-03 ENCOUNTER — HEALTH MAINTENANCE LETTER (OUTPATIENT)
Age: 36
End: 2021-01-03

## 2021-01-11 ENCOUNTER — TELEPHONE (OUTPATIENT)
Dept: OBGYN | Facility: CLINIC | Age: 36
End: 2021-01-11

## 2021-02-08 ENCOUNTER — OFFICE VISIT (OUTPATIENT)
Dept: OBGYN | Facility: CLINIC | Age: 36
End: 2021-02-08
Attending: OBSTETRICS & GYNECOLOGY
Payer: COMMERCIAL

## 2021-02-08 ENCOUNTER — ANCILLARY PROCEDURE (OUTPATIENT)
Dept: ULTRASOUND IMAGING | Facility: CLINIC | Age: 36
End: 2021-02-08
Attending: OBSTETRICS & GYNECOLOGY
Payer: COMMERCIAL

## 2021-02-08 ENCOUNTER — APPOINTMENT (OUTPATIENT)
Dept: LAB | Facility: CLINIC | Age: 36
End: 2021-02-08
Attending: OBSTETRICS & GYNECOLOGY
Payer: COMMERCIAL

## 2021-02-08 VITALS
HEIGHT: 66 IN | SYSTOLIC BLOOD PRESSURE: 111 MMHG | HEART RATE: 79 BPM | WEIGHT: 131.6 LBS | DIASTOLIC BLOOD PRESSURE: 77 MMHG | BODY MASS INDEX: 21.15 KG/M2

## 2021-02-08 DIAGNOSIS — Z34.80 SUPERVISION OF OTHER NORMAL PREGNANCY: ICD-10-CM

## 2021-02-08 DIAGNOSIS — Z34.91 ENCOUNTER FOR SUPERVISION OF NORMAL PREGNANCY IN FIRST TRIMESTER, UNSPECIFIED GRAVIDITY: ICD-10-CM

## 2021-02-08 DIAGNOSIS — Z34.91 NORMAL PREGNANCY, FIRST TRIMESTER: Primary | ICD-10-CM

## 2021-02-08 DIAGNOSIS — G43.009 MIGRAINE WITHOUT AURA AND WITHOUT STATUS MIGRAINOSUS, NOT INTRACTABLE: ICD-10-CM

## 2021-02-08 PROBLEM — Z00.6 RESEARCH STUDY PATIENT: Status: RESOLVED | Noted: 2018-06-05 | Resolved: 2021-02-08

## 2021-02-08 LAB
ABO + RH BLD: NORMAL
ABO + RH BLD: NORMAL
BASOPHILS # BLD AUTO: 0 10E9/L (ref 0–0.2)
BASOPHILS NFR BLD AUTO: 0.6 %
BLD GP AB SCN SERPL QL: NORMAL
BLOOD BANK CMNT PATIENT-IMP: NORMAL
DEPRECATED CALCIDIOL+CALCIFEROL SERPL-MC: 35 UG/L (ref 20–75)
DIFFERENTIAL METHOD BLD: NORMAL
EOSINOPHIL # BLD AUTO: 0.1 10E9/L (ref 0–0.7)
EOSINOPHIL NFR BLD AUTO: 0.9 %
ERYTHROCYTE [DISTWIDTH] IN BLOOD BY AUTOMATED COUNT: 11.8 % (ref 10–15)
HBV SURFACE AB SERPL IA-ACNC: 68.51 M[IU]/ML
HBV SURFACE AG SERPL QL IA: NONREACTIVE
HCT VFR BLD AUTO: 38.5 % (ref 35–47)
HCV AB SERPL QL IA: NONREACTIVE
HGB BLD-MCNC: 13.5 G/DL (ref 11.7–15.7)
HIV 1+2 AB+HIV1 P24 AG SERPL QL IA: NONREACTIVE
IMM GRANULOCYTES # BLD: 0 10E9/L (ref 0–0.4)
IMM GRANULOCYTES NFR BLD: 0.4 %
LYMPHOCYTES # BLD AUTO: 1 10E9/L (ref 0.8–5.3)
LYMPHOCYTES NFR BLD AUTO: 18.9 %
MCH RBC QN AUTO: 30.9 PG (ref 26.5–33)
MCHC RBC AUTO-ENTMCNC: 35.1 G/DL (ref 31.5–36.5)
MCV RBC AUTO: 88 FL (ref 78–100)
MONOCYTES # BLD AUTO: 0.4 10E9/L (ref 0–1.3)
MONOCYTES NFR BLD AUTO: 6.8 %
NEUTROPHILS # BLD AUTO: 3.8 10E9/L (ref 1.6–8.3)
NEUTROPHILS NFR BLD AUTO: 72.4 %
NRBC # BLD AUTO: 0 10*3/UL
NRBC BLD AUTO-RTO: 0 /100
PLATELET # BLD AUTO: 175 10E9/L (ref 150–450)
RBC # BLD AUTO: 4.37 10E12/L (ref 3.8–5.2)
SPECIMEN EXP DATE BLD: NORMAL
T PALLIDUM AB SER QL: NONREACTIVE
WBC # BLD AUTO: 5.3 10E9/L (ref 4–11)

## 2021-02-08 PROCEDURE — 87389 HIV-1 AG W/HIV-1&-2 AB AG IA: CPT | Performed by: ADVANCED PRACTICE MIDWIFE

## 2021-02-08 PROCEDURE — 76801 OB US < 14 WKS SINGLE FETUS: CPT | Mod: 26 | Performed by: OBSTETRICS & GYNECOLOGY

## 2021-02-08 PROCEDURE — 82306 VITAMIN D 25 HYDROXY: CPT | Performed by: ADVANCED PRACTICE MIDWIFE

## 2021-02-08 PROCEDURE — 86850 RBC ANTIBODY SCREEN: CPT | Performed by: ADVANCED PRACTICE MIDWIFE

## 2021-02-08 PROCEDURE — G0463 HOSPITAL OUTPT CLINIC VISIT: HCPCS

## 2021-02-08 PROCEDURE — 76801 OB US < 14 WKS SINGLE FETUS: CPT

## 2021-02-08 PROCEDURE — 86780 TREPONEMA PALLIDUM: CPT | Performed by: ADVANCED PRACTICE MIDWIFE

## 2021-02-08 PROCEDURE — 86706 HEP B SURFACE ANTIBODY: CPT | Performed by: ADVANCED PRACTICE MIDWIFE

## 2021-02-08 PROCEDURE — 86901 BLOOD TYPING SEROLOGIC RH(D): CPT | Performed by: ADVANCED PRACTICE MIDWIFE

## 2021-02-08 PROCEDURE — 87086 URINE CULTURE/COLONY COUNT: CPT | Performed by: ADVANCED PRACTICE MIDWIFE

## 2021-02-08 PROCEDURE — G0008 ADMIN INFLUENZA VIRUS VAC: HCPCS

## 2021-02-08 PROCEDURE — 87340 HEPATITIS B SURFACE AG IA: CPT | Performed by: ADVANCED PRACTICE MIDWIFE

## 2021-02-08 PROCEDURE — 250N000011 HC RX IP 250 OP 636

## 2021-02-08 PROCEDURE — 86803 HEPATITIS C AB TEST: CPT | Performed by: ADVANCED PRACTICE MIDWIFE

## 2021-02-08 PROCEDURE — 99207 PR PRENATAL VISIT: CPT | Performed by: ADVANCED PRACTICE MIDWIFE

## 2021-02-08 PROCEDURE — 86762 RUBELLA ANTIBODY: CPT | Performed by: ADVANCED PRACTICE MIDWIFE

## 2021-02-08 PROCEDURE — 90686 IIV4 VACC NO PRSV 0.5 ML IM: CPT

## 2021-02-08 PROCEDURE — G0463 HOSPITAL OUTPT CLINIC VISIT: HCPCS | Mod: 25

## 2021-02-08 PROCEDURE — 36415 COLL VENOUS BLD VENIPUNCTURE: CPT | Performed by: ADVANCED PRACTICE MIDWIFE

## 2021-02-08 PROCEDURE — 85025 COMPLETE CBC W/AUTO DIFF WBC: CPT | Performed by: ADVANCED PRACTICE MIDWIFE

## 2021-02-08 PROCEDURE — 86900 BLOOD TYPING SEROLOGIC ABO: CPT | Performed by: ADVANCED PRACTICE MIDWIFE

## 2021-02-08 SDOH — ECONOMIC STABILITY: INCOME INSECURITY: HOW HARD IS IT FOR YOU TO PAY FOR THE VERY BASICS LIKE FOOD, HOUSING, MEDICAL CARE, AND HEATING?: NOT ASKED

## 2021-02-08 SDOH — ECONOMIC STABILITY: FOOD INSECURITY: WITHIN THE PAST 12 MONTHS, YOU WORRIED THAT YOUR FOOD WOULD RUN OUT BEFORE YOU GOT MONEY TO BUY MORE.: NOT ASKED

## 2021-02-08 SDOH — ECONOMIC STABILITY: TRANSPORTATION INSECURITY
IN THE PAST 12 MONTHS, HAS LACK OF TRANSPORTATION KEPT YOU FROM MEETINGS, WORK, OR FROM GETTING THINGS NEEDED FOR DAILY LIVING?: NOT ASKED

## 2021-02-08 SDOH — ECONOMIC STABILITY: FOOD INSECURITY: WITHIN THE PAST 12 MONTHS, THE FOOD YOU BOUGHT JUST DIDN'T LAST AND YOU DIDN'T HAVE MONEY TO GET MORE.: NOT ASKED

## 2021-02-08 SDOH — ECONOMIC STABILITY: TRANSPORTATION INSECURITY
IN THE PAST 12 MONTHS, HAS THE LACK OF TRANSPORTATION KEPT YOU FROM MEDICAL APPOINTMENTS OR FROM GETTING MEDICATIONS?: NOT ASKED

## 2021-02-08 ASSESSMENT — MIFFLIN-ST. JEOR: SCORE: 1318.43

## 2021-02-08 ASSESSMENT — PATIENT HEALTH QUESTIONNAIRE - PHQ9
SUM OF ALL RESPONSES TO PHQ QUESTIONS 1-9: 4
5. POOR APPETITE OR OVEREATING: SEVERAL DAYS

## 2021-02-08 ASSESSMENT — ANXIETY QUESTIONNAIRES
GAD7 TOTAL SCORE: 4
IF YOU CHECKED OFF ANY PROBLEMS ON THIS QUESTIONNAIRE, HOW DIFFICULT HAVE THESE PROBLEMS MADE IT FOR YOU TO DO YOUR WORK, TAKE CARE OF THINGS AT HOME, OR GET ALONG WITH OTHER PEOPLE: NOT DIFFICULT AT ALL
6. BECOMING EASILY ANNOYED OR IRRITABLE: NOT AT ALL
1. FEELING NERVOUS, ANXIOUS, OR ON EDGE: SEVERAL DAYS
2. NOT BEING ABLE TO STOP OR CONTROL WORRYING: SEVERAL DAYS
5. BEING SO RESTLESS THAT IT IS HARD TO SIT STILL: NOT AT ALL
7. FEELING AFRAID AS IF SOMETHING AWFUL MIGHT HAPPEN: NOT AT ALL
3. WORRYING TOO MUCH ABOUT DIFFERENT THINGS: SEVERAL DAYS

## 2021-02-08 ASSESSMENT — PAIN SCALES - GENERAL: PAINLEVEL: NO PAIN (0)
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
## 2021-02-09 LAB
BACTERIA SPEC CULT: NO GROWTH
Lab: NORMAL
RUBV IGG SERPL IA-ACNC: 42 IU/ML
SPECIMEN SOURCE: NORMAL

## 2021-02-09 ASSESSMENT — ANXIETY QUESTIONNAIRES: GAD7 TOTAL SCORE: 4

## 2021-03-06 ENCOUNTER — HEALTH MAINTENANCE LETTER (OUTPATIENT)
Age: 36
End: 2021-03-06

## 2021-03-08 ASSESSMENT — ANXIETY QUESTIONNAIRES
1. FEELING NERVOUS, ANXIOUS, OR ON EDGE: SEVERAL DAYS
2. NOT BEING ABLE TO STOP OR CONTROL WORRYING: NOT AT ALL
3. WORRYING TOO MUCH ABOUT DIFFERENT THINGS: SEVERAL DAYS
5. BEING SO RESTLESS THAT IT IS HARD TO SIT STILL: NOT AT ALL
7. FEELING AFRAID AS IF SOMETHING AWFUL MIGHT HAPPEN: NOT AT ALL
GAD7 TOTAL SCORE: 4
GAD7 TOTAL SCORE: 4
6. BECOMING EASILY ANNOYED OR IRRITABLE: SEVERAL DAYS
4. TROUBLE RELAXING: SEVERAL DAYS
7. FEELING AFRAID AS IF SOMETHING AWFUL MIGHT HAPPEN: NOT AT ALL

## 2021-03-09 ASSESSMENT — ANXIETY QUESTIONNAIRES: GAD7 TOTAL SCORE: 4

## 2021-03-09 NOTE — PROGRESS NOTES
SUBJECTIVE:   Sherita is a 35 year old female who presents to clinic for a new OB visit.   at 14w5d with Estimated Date of Delivery: Sep 5, 2021 based on LMP. Feels well. Has started PNV.     She has not had bleeding since her LMP.   She has had nausea resulting in bilious Weight loss has not occurred.   This was a planned pregnancy.   FOB is involved,  John   OTHER CONCERNS: Covid vaccine    ===========================================   ROS: 10 point ROS neg other than the symptoms noted above in the HPI.    PSYCHIATRIC:  MH is ok so far    PHQ-9 score:    PHQ-9 SCORE 2021   PHQ-9 Total Score MyChart -   PHQ-9 Total Score 4     VESNA-7 SCORE 2020 2021 3/8/2021   Total Score - - 4 (minimal anxiety)   Total Score 6 4 4     Past History:  Her past medical history   Past Medical History:   Diagnosis Date     Migraines     without aura     Her past pregnancies have been uncomplicated  Since her last LMP she denies use of alcohol, tobacco and street drugs.    HISTORY:  Family History   Problem Relation Age of Onset     Lung Cancer Paternal Grandmother         nonsmoker     Breast Cancer Paternal Grandmother         over age 50     Alzheimer Disease Paternal Grandfather      Colon Cancer Paternal Aunt      Coronary Artery Disease Early Onset No family hx of      Hypertension No family hx of      Hyperlipidemia No family hx of      Diabetes No family hx of      Melanoma No family hx of      Skin Cancer No family hx of      Glaucoma No family hx of      Macular Degeneration No family hx of      Social History     Socioeconomic History     Marital status:      Spouse name: John     Number of children: Not on file     Years of education: Not on file     Highest education level: Not on file   Occupational History     Occupation: Account Management     Comment: [employer]   Social Needs     Financial resource strain: Not on file     Food insecurity     Worry: Not on file     Inability: Not on  file     Transportation needs     Medical: Not on file     Non-medical: Not on file   Tobacco Use     Smoking status: Never Smoker     Smokeless tobacco: Never Used   Substance and Sexual Activity     Alcohol use: No     Alcohol/week: 2.0 standard drinks     Comment: 3-5 drinks/week     Drug use: No     Sexual activity: Yes     Partners: Male     Birth control/protection: Pull-out method   Lifestyle     Physical activity     Days per week: Not on file     Minutes per session: Not on file     Stress: Not on file   Relationships     Social connections     Talks on phone: Not on file     Gets together: Not on file     Attends Holiness service: Not on file     Active member of club or organization: Not on file     Attends meetings of clubs or organizations: Not on file     Relationship status: Not on file     Intimate partner violence     Fear of current or ex partner: Not on file     Emotionally abused: Not on file     Physically abused: Not on file     Forced sexual activity: Not on file   Other Topics Concern     Parent/sibling w/ CABG, MI or angioplasty before 65F 55M? No   Social History Narrative    Lives with . Just moved to [place] from [place] area where she finished grad school.  She now has a new job and works remotely for a company in [place].  Her  took a job here in [place] and he is from [place].        Has a good support system.    Feels safe in all environments.    Wears seatbelt 100% of the time    Wears helmet while biking.    Denies history of abuse, past or present, physical, sexual or emotional.    Kimberly Razo PA-C    01/29/18        How much exercise per week? Daily activiies  4-5 days    How much calcium per day? In prenatals  and foods       How much caffeine per day? 1-2 cups    How much vitamin D per day? In supplements    Do you/your family wear seatbelts?  Yes    Do you/your family use safety helmets? Yes    Do you/your family use sunscreen? Yes    Do you/your family keep firearms in  "the home? No    Do you/your family have a smoke detector(s)? Yes        Reviewed [facility] n 21         Current Outpatient Medications   Medication Sig     ondansetron (ZOFRAN-ODT) 4 MG ODT tab Take 1 tablet (4 mg) by mouth every 6 hours as needed for nausea     Omega-3 Fatty Acids (OMEGA 3 500 PO)      Prenatal Vit-Fe Fumarate-FA (PRENATAL MULTIVITAMIN PLUS IRON) 27-0.8 MG TABS per tablet Take 1 tablet by mouth daily     rizatriptan (MAXALT) 10 MG tablet      VITAMIN D, CHOLECALCIFEROL, PO Take 2,000 Units by mouth daily     No current facility-administered medications for this visit.      No Known Allergies    ============================================  MEDICAL HISTORY  Past Medical History:   Diagnosis Date     Migraines     without aura     Past Surgical History:   Procedure Laterality Date     ANKLE SURGERY       COLONOSCOPY      normal result; done due to digestive issues     HERNIA REPAIR       ORTHOPEDIC SURGERY      Right ankle       OB History    Para Term  AB Living   2 1 1 0 0 1   SAB TAB Ectopic Multiple Live Births   0 0 0 0 1      # Outcome Date GA Lbr Allen/2nd Weight Sex Delivery Anes PTL Lv   2 Current            1 Term 18 41w0d 10:39 / 01:36 4.06 kg (8 lb 15.2 oz) M Vag-Spont None N HANH      Complications: Dysfunctional Labor      Name: BRENDON MUNOZ      Apgar1: 8  Apgar5: 9     GYN History- Abnormal Pap Smears: no                        Cervical procedures: no                        History of STI: no    NExt PAP due 3/2023    I personally reviewed the past social/family/medical and surgical history on the date of service.   I reviewed lab work done at Intake visit with patient.    EXAM:  /77   Pulse 76   Ht 1.676 m (5' 6\")   Wt 59.4 kg (131 lb)   LMP 2020   BMI 21.14 kg/m     EXAM:  GENERAL:  Pleasant pregnant female, alert, cooperative and well groomed.  SKIN:  Warm and dry, without lesions or rashes  HEAD: Symmetrical " features.  MOUTH:  Buccal mucosa pink, moist without lesions.  Teeth in good repair.    NECK:  Thyroid without enlargement and nodules.  Lymph nodes not palpable.   LUNGS:  Clear to auscultation.  BREAST:    No dominant, fixed or suspicious masses are noted.  No skin or nipple changes or axillary nodes.   Nipples everted.      HEART:  RRR without murmur.  ABDOMEN: Soft without masses , tenderness or organomegaly.  No CVA tenderness.  Uterus palpable at size equal to dates.  No scars noted.. Fetal heart tones present.  MUSCULOSKELETAL:  Full range of motion  EXTREMITIES:  No edema. No significant varicosities.   PELVIC EXAM: Deferred    WET PREP:Not done  GC/CHLAMYDIA CULTURE OBTAINED:YES    Lab Results   Component Value Date    PAP NIL 2018          ASSESSMENT:  35 year old , 14w2d weeks of pregnancy with EMMANUEL of Sep 5, 2021 by LMP  Intrauterine pregnancy 14w2d size is consistent with dates  Genetic Screening: Level 2 Ultrasound only    ICD-10-CM    1. Supervision of other normal pregnancy  Z34.80 NEISSERIA GONORRHOEA PCR     CHLAMYDIA TRACHOMATIS PCR     ondansetron (ZOFRAN-ODT) 4 MG ODT tab     [facility] REFERRAL-PREGNANCY       PLAN:  - Reviewed use of triage nurse line and contacting the on-call provider after hours for an urgent need such as fever, vagina bleeding, bladder or vaginal infection, rupture of membranes,  or term labor.    - Reviewed best evidence for: weight gain for her weight and height for pregnancy:  Based on pre-pregnancy weight of 131 and Body mass index is 21.14 kg/m . RECOMMENDED WEIGHT GAIN: 25-35 lbs.  - Reviewed healthy diet and foods to avoid; exercise and activity during pregnancy; avoiding exposure to toxoplasmosis; and maintenance of a generally healthy lifestyle.   - Discussed the harms, benefits, side effects and alternative therapies for current prescribed and OTC medications.  - Will present to ED or UR if she has another episode where she is unable to  even take sips of water - otherwise plans to start Unisome and B6 for her N/V and has an Rx for zofran if needed.  - Will take Miralax for days she struggles with constipation.  - All pt's and partner's questions discussed and answered.  Pt verbalized understanding of and agreement to plan of care.     - Continue scheduled prenatal care and prn if questions or concerns    Jeri Rodrigues CNM        Answers for HPI/ROS submitted by the patient on 3/8/2021   VESNA 7 TOTAL SCORE: 4  General Symptoms: No  Skin Symptoms: No  HENT Symptoms: No  EYE SYMPTOMS: No  HEART SYMPTOMS: No  LUNG SYMPTOMS: No  INTESTINAL SYMPTOMS: No  URINARY SYMPTOMS: No  GYNECOLOGIC SYMPTOMS: No  BREAST SYMPTOMS: No  SKELETAL SYMPTOMS: No  BLOOD SYMPTOMS: No  NERVOUS SYSTEM SYMPTOMS: No  MENTAL HEALTH SYMPTOMS: No

## 2021-03-12 ENCOUNTER — OFFICE VISIT (OUTPATIENT)
Dept: OBGYN | Facility: CLINIC | Age: 36
End: 2021-03-12
Attending: ADVANCED PRACTICE MIDWIFE
Payer: COMMERCIAL

## 2021-03-12 ENCOUNTER — TRANSCRIBE ORDERS (OUTPATIENT)
Dept: MATERNAL FETAL MEDICINE | Facility: CLINIC | Age: 36
End: 2021-03-12

## 2021-03-12 VITALS
DIASTOLIC BLOOD PRESSURE: 77 MMHG | BODY MASS INDEX: 21.05 KG/M2 | WEIGHT: 131 LBS | HEIGHT: 66 IN | SYSTOLIC BLOOD PRESSURE: 114 MMHG | HEART RATE: 76 BPM

## 2021-03-12 DIAGNOSIS — Z34.80 SUPERVISION OF OTHER NORMAL PREGNANCY: ICD-10-CM

## 2021-03-12 DIAGNOSIS — Z34.80 SUPERVISION OF OTHER NORMAL PREGNANCY: Primary | ICD-10-CM

## 2021-03-12 DIAGNOSIS — O26.90 PREGNANCY RELATED CONDITION, ANTEPARTUM: Primary | ICD-10-CM

## 2021-03-12 PROCEDURE — G0463 HOSPITAL OUTPT CLINIC VISIT: HCPCS

## 2021-03-12 PROCEDURE — 87491 CHLMYD TRACH DNA AMP PROBE: CPT | Performed by: ADVANCED PRACTICE MIDWIFE

## 2021-03-12 PROCEDURE — G0463 HOSPITAL OUTPT CLINIC VISIT: HCPCS | Mod: 25

## 2021-03-12 PROCEDURE — 87591 N.GONORRHOEAE DNA AMP PROB: CPT | Performed by: ADVANCED PRACTICE MIDWIFE

## 2021-03-12 PROCEDURE — 99207 PR PRENATAL VISIT: CPT | Performed by: ADVANCED PRACTICE MIDWIFE

## 2021-03-12 RX ORDER — ONDANSETRON 4 MG/1
4 TABLET, ORALLY DISINTEGRATING ORAL EVERY 6 HOURS PRN
Qty: 15 TABLET | Refills: 0 | Status: SHIPPED | OUTPATIENT
Start: 2021-03-12 | End: 2021-07-02

## 2021-03-12 ASSESSMENT — PAIN SCALES - GENERAL: PAINLEVEL: NO PAIN (0)

## 2021-03-12 ASSESSMENT — MIFFLIN-ST. JEOR: SCORE: 1315.96

## 2021-03-12 NOTE — LETTER
3/12/2021       RE: Sherita Glasgow  [address]  [facility] [address]     Dear Colleague,    Thank you for referring your patient, Sherita Glasgow, to the [facility] [place] at [facility]. Please see a copy of my visit note below.    SUBJECTIVE:   Sherita is a 35 year old female who presents to clinic for a new OB visit.   at 14w5d with Estimated Date of Delivery: Sep 5, 2021 based on LMP. Feels well. Has started PNV.     She has not had bleeding since her LMP.   She has had nausea resulting in bilious Weight loss has not occurred.   This was a planned pregnancy.   FOB is involved,  John   OTHER CONCERNS: Covid vaccine    ===========================================   ROS: 10 point ROS neg other than the symptoms noted above in the HPI.    PSYCHIATRIC:  MH is ok so far    PHQ-9 score:    PHQ-9 SCORE 2021   PHQ-9 Total Score MyChart -   PHQ-9 Total Score 4     VESNA-7 SCORE 2020 2021 3/8/2021   Total Score - - 4 (minimal anxiety)   Total Score 6 4 4     Past History:  Her past medical history   Past Medical History:   Diagnosis Date     Migraines     without aura     Her past pregnancies have been uncomplicated  Since her last LMP she denies use of alcohol, tobacco and street drugs.    HISTORY:  Family History   Problem Relation Age of Onset     Lung Cancer Paternal Grandmother         nonsmoker     Breast Cancer Paternal Grandmother         over age 50     Alzheimer Disease Paternal Grandfather      Colon Cancer Paternal Aunt      Coronary Artery Disease Early Onset No family hx of      Hypertension No family hx of      Hyperlipidemia No family hx of      Diabetes No family hx of      Melanoma No family hx of      Skin Cancer No family hx of      Glaucoma No family hx of      Macular Degeneration No family hx of      Social History     Socioeconomic History     Marital status:      Spouse name: John      Number of children: Not on file     Years of education: Not on file     Highest education level: Not on file   Occupational History     Occupation: Account Management     Comment: [employer]   Social Needs     Financial resource strain: Not on file     Food insecurity     Worry: Not on file     Inability: Not on file     Transportation needs     Medical: Not on file     Non-medical: Not on file   Tobacco Use     Smoking status: Never Smoker     Smokeless tobacco: Never Used   Substance and Sexual Activity     Alcohol use: No     Alcohol/week: 2.0 standard drinks     Comment: 3-5 drinks/week     Drug use: No     Sexual activity: Yes     Partners: Male     Birth control/protection: Pull-out method   Lifestyle     Physical activity     Days per week: Not on file     Minutes per session: Not on file     Stress: Not on file   Relationships     Social connections     Talks on phone: Not on file     Gets together: Not on file     Attends Yarsanism service: Not on file     Active member of club or organization: Not on file     Attends meetings of clubs or organizations: Not on file     Relationship status: Not on file     Intimate partner violence     Fear of current or ex partner: Not on file     Emotionally abused: Not on file     Physically abused: Not on file     Forced sexual activity: Not on file   Other Topics Concern     Parent/sibling w/ CABG, MI or angioplasty before 65F 55M? No   Social History Narrative    Lives with . Just moved to [place] from [place] area where she finished grad school.  She now has a new job and works remotely for a company in [place].  Her  took a job here in [place] and he is from [place].        Has a good support system.    Feels safe in all environments.    Wears seatbelt 100% of the time    Wears helmet while biking.    Denies history of abuse, past or present, physical, sexual or emotional.    Kimberly Razo PA-C    01/29/18        How much exercise per week? Daily activiies  4-5  days    How much calcium per day? In prenatals  and foods       How much caffeine per day? 1-2 cups    How much vitamin D per day? In supplements    Do you/your family wear seatbelts?  Yes    Do you/your family use safety helmets? Yes    Do you/your family use sunscreen? Yes    Do you/your family keep firearms in the home? No    Do you/your family have a smoke detector(s)? Yes        [place]ed [facility] [place]         Current Outpatient Medications   Medication Sig     ondansetron (ZOFRAN-ODT) 4 MG ODT tab Take 1 tablet (4 mg) by mouth every 6 hours as needed for nausea     Omega-3 Fatty Acids (OMEGA 3 500 PO)      Prenatal Vit-Fe Fumarate-FA (PRENATAL MULTIVITAMIN PLUS IRON) 27-0.8 MG TABS per tablet Take 1 tablet by mouth daily     rizatriptan (MAXALT) 10 MG tablet      VITAMIN D, CHOLECALCIFEROL, PO Take 2,000 Units by mouth daily     No current facility-administered medications for this visit.      No Known Allergies    ============================================  MEDICAL HISTORY  Past Medical History:   Diagnosis Date     Migraines     without aura     Past Surgical History:   Procedure Laterality Date     ANKLE SURGERY       COLONOSCOPY      normal result; done due to digestive issues     HERNIA REPAIR  1994     ORTHOPEDIC SURGERY  2008    Right ankle       OB History    Para Term  AB Living   2 1 1 0 0 1   SAB TAB Ectopic Multiple Live Births   0 0 0 0 1      # Outcome Date GA Lbr Allen/2nd Weight Sex Delivery Anes PTL Lv   2 Current            1 Term 18 41w0d 10:39 / 01:36 4.06 kg (8 lb 15.2 oz) M Vag-Spont None N HANH      Complications: Dysfunctional Labor      Name: BRENDON MUNOZ      Apgar1: 8  Apgar5: 9     GYN History- Abnormal Pap Smears: no                        Cervical procedures: no                        History of STI: no    NExt PAP due 3/2023    I personally reviewed the past social/family/medical and surgical history on the date of service.   I reviewed lab  "work done at Intake visit with patient.    EXAM:  /77   Pulse 76   Ht 1.676 m (5' 6\")   Wt 59.4 kg (131 lb)   LMP 2020   BMI 21.14 kg/m     EXAM:  GENERAL:  Pleasant pregnant female, alert, cooperative and well groomed.  SKIN:  Warm and dry, without lesions or rashes  HEAD: Symmetrical features.  MOUTH:  Buccal mucosa pink, moist without lesions.  Teeth in good repair.    NECK:  Thyroid without enlargement and nodules.  Lymph nodes not palpable.   LUNGS:  Clear to auscultation.  BREAST:    No dominant, fixed or suspicious masses are noted.  No skin or nipple changes or axillary nodes.   Nipples everted.      HEART:  RRR without murmur.  ABDOMEN: Soft without masses , tenderness or organomegaly.  No CVA tenderness.  Uterus palpable at size equal to dates.  No scars noted.. Fetal heart tones present.  MUSCULOSKELETAL:  Full range of motion  EXTREMITIES:  No edema. No significant varicosities.   PELVIC EXAM: Deferred    WET PREP:Not done  GC/CHLAMYDIA CULTURE OBTAINED:YES    Lab Results   Component Value Date    PAP NIL 2018          ASSESSMENT:  35 year old , 14w2d weeks of pregnancy with EMMANUEL of Sep 5, 2021 by LMP  Intrauterine pregnancy 14w2d size is consistent with dates  Genetic Screening: Level 2 Ultrasound only    ICD-10-CM    1. Supervision of other normal pregnancy  Z34.80 NEISSERIA GONORRHOEA PCR     CHLAMYDIA TRACHOMATIS PCR     ondansetron (ZOFRAN-ODT) 4 MG ODT tab     [facility] REFERRAL-PREGNANCY       PLAN:  - Reviewed use of triage nurse line and contacting the on-call provider after hours for an urgent need such as fever, vagina bleeding, bladder or vaginal infection, rupture of membranes,  or term labor.    - Reviewed best evidence for: weight gain for her weight and height for pregnancy:  Based on pre-pregnancy weight of 131 and Body mass index is 21.14 kg/m . RECOMMENDED WEIGHT GAIN: 25-35 lbs.  - Reviewed healthy diet and foods to avoid; exercise and " activity during pregnancy; avoiding exposure to toxoplasmosis; and maintenance of a generally healthy lifestyle.   - Discussed the harms, benefits, side effects and alternative therapies for current prescribed and OTC medications.  - Will present to ED or UR if she has another episode where she is unable to even take sips of water - otherwise plans to start Unisome and B6 for her N/V and has an Rx for zofran if needed.  - Will take Miralax for days she struggles with constipation.  - All pt's and partner's questions discussed and answered.  Pt verbalized understanding of and agreement to plan of care.     - Continue scheduled prenatal care and prn if questions or concerns    Jeri Rodrigues CNM        Answers for HPI/ROS submitted by the patient on 3/8/2021   VESNA 7 TOTAL SCORE: 4  General Symptoms: No  Skin Symptoms: No  HENT Symptoms: No  EYE SYMPTOMS: No  HEART SYMPTOMS: No  LUNG SYMPTOMS: No  INTESTINAL SYMPTOMS: No  URINARY SYMPTOMS: No  GYNECOLOGIC SYMPTOMS: No  BREAST SYMPTOMS: No  SKELETAL SYMPTOMS: No  BLOOD SYMPTOMS: No  NERVOUS SYSTEM SYMPTOMS: No  MENTAL HEALTH SYMPTOMS: No

## 2021-04-07 ENCOUNTER — PRE VISIT (OUTPATIENT)
Dept: MATERNAL FETAL MEDICINE | Facility: CLINIC | Age: 36
End: 2021-04-07

## 2021-04-09 ENCOUNTER — HOSPITAL ENCOUNTER (OUTPATIENT)
Dept: ULTRASOUND IMAGING | Facility: CLINIC | Age: 36
End: 2021-04-09
Attending: ADVANCED PRACTICE MIDWIFE
Payer: COMMERCIAL

## 2021-04-09 ENCOUNTER — OFFICE VISIT (OUTPATIENT)
Dept: MATERNAL FETAL MEDICINE | Facility: CLINIC | Age: 36
End: 2021-04-09
Attending: ADVANCED PRACTICE MIDWIFE
Payer: COMMERCIAL

## 2021-04-09 DIAGNOSIS — Z36.3 SCREENING, ANTENATAL, FOR MALFORMATION BY ULTRASOUND: Primary | ICD-10-CM

## 2021-04-09 DIAGNOSIS — O26.90 PREGNANCY RELATED CONDITION, ANTEPARTUM: ICD-10-CM

## 2021-04-09 PROCEDURE — 76811 OB US DETAILED SNGL FETUS: CPT

## 2021-04-09 PROCEDURE — 76805 OB US >/= 14 WKS SNGL FETUS: CPT | Mod: 26 | Performed by: OBSTETRICS & GYNECOLOGY

## 2021-04-09 PROCEDURE — 76805 OB US >/= 14 WKS SNGL FETUS: CPT

## 2021-04-09 NOTE — PROGRESS NOTES
Please refer to ultrasound report under 'Imaging' Studies of 'Chart Review' tabs.    Francisco Amos M.D.     Professional Component, Technical Component Or Both?: professional and technical component

## 2021-04-16 ENCOUNTER — TELEPHONE (OUTPATIENT)
Dept: OBGYN | Facility: CLINIC | Age: 36
End: 2021-04-16

## 2021-04-17 NOTE — TELEPHONE ENCOUNTER
"Received page from patient reporting nasal congestion and feeling like a \"head cold\" for the past 2 days. Today she reports a headache with subsequent nausea and vomiting, she thinks from the pain. She took tylenol this evening, but is wondering if there is anything more for her headache and sinus pressure. No fever, no known covid exposure, no vision changes. Reviewed that she can take 2 extra strength tylenol and not to exceed 4000mg in a 24-hr period. Instructed to push fluids as well. Additionally, can take benadryl or sudafed for sinus pressure, along with use of idalmis-pot for saline nasal rinses. If symptoms do not improve with those measures and she feels she needs further evaluation, instructed patient to present to the ED. Patient verbalized understanding.    Alisa Bassett CNM on 4/16/2021 at 8:15 PM    "

## 2021-04-20 ENCOUNTER — OFFICE VISIT (OUTPATIENT)
Dept: FAMILY MEDICINE | Facility: CLINIC | Age: 36
End: 2021-04-20
Payer: COMMERCIAL

## 2021-04-20 ENCOUNTER — TELEPHONE (OUTPATIENT)
Dept: OBGYN | Facility: CLINIC | Age: 36
End: 2021-04-20

## 2021-04-20 VITALS
OXYGEN SATURATION: 96 % | DIASTOLIC BLOOD PRESSURE: 71 MMHG | BODY MASS INDEX: 22.52 KG/M2 | WEIGHT: 140.1 LBS | HEIGHT: 66 IN | HEART RATE: 86 BPM | SYSTOLIC BLOOD PRESSURE: 112 MMHG | TEMPERATURE: 98.4 F

## 2021-04-20 DIAGNOSIS — R05.9 COUGH: ICD-10-CM

## 2021-04-20 DIAGNOSIS — J06.9 UPPER RESPIRATORY TRACT INFECTION, UNSPECIFIED TYPE: Primary | ICD-10-CM

## 2021-04-20 ASSESSMENT — MIFFLIN-ST. JEOR: SCORE: 1358.83

## 2021-04-20 NOTE — PATIENT INSTRUCTIONS
Upper respiratory infection  Try OTC Mucinex to break up mucus  Continue with Netti pot  Can steam head as well  Should begin to see improvement in symptoms around day 7. If persistent symptoms day 10 and no sign of improvement or worsening, let me know. Can consider antibiotics but do not think they are warranted at this time.   If headaches become intractable, should be seen.   COVID PCR test ordered

## 2021-04-20 NOTE — NURSING NOTE
"ROOM:1    35 year old  Chief Complaint   Patient presents with     Sinus Problem     Pressure, used a netipot, tried taking allergy medication     Cough     Turned into a cough, after having a runny nose       Blood pressure 112/71, pulse 86, temperature 98.4  F (36.9  C), temperature source Oral, height 1.679 m (5' 6.1\"), weight 63.5 kg (140 lb 1.6 oz), last menstrual period 11/29/2020, SpO2 96 %, not currently breastfeeding. Body mass index is 22.54 kg/m .  BP completed using cuff size:    Patient Active Problem List   Diagnosis     Migraine without aura and without status migrainosus, not intractable     Adjustment disorder with mixed anxiety and depressed mood     Supervision of other normal pregnancy       Wt Readings from Last 2 Encounters:   04/20/21 63.5 kg (140 lb 1.6 oz)   03/12/21 59.4 kg (131 lb)     BP Readings from Last 3 Encounters:   04/20/21 112/71   03/12/21 114/77   02/08/21 111/77       No Known Allergies    Current Outpatient Medications   Medication     Omega-3 Fatty Acids (OMEGA 3 500 PO)     ondansetron (ZOFRAN-ODT) 4 MG ODT tab     Prenatal Vit-Fe Fumarate-FA (PRENATAL MULTIVITAMIN PLUS IRON) 27-0.8 MG TABS per tablet     rizatriptan (MAXALT) 10 MG tablet     VITAMIN D, CHOLECALCIFEROL, PO     No current facility-administered medications for this visit.        Social History     Tobacco Use     Smoking status: Never Smoker     Smokeless tobacco: Never Used   Substance Use Topics     Alcohol use: Not Currently     Alcohol/week: 2.0 standard drinks     Comment: 3-5 drinks/week     Drug use: No       Honoring Choices - Health Care Directive Guide offered to patient at time of visit.    Health Maintenance Due   Topic Date Due     ADVANCE CARE PLANNING  Never done     PREVENTIVE CARE VISIT  01/14/2021     MATERNAL SCREENING  Never done     OBGCT (OB)  05/16/2021       Immunization History   Administered Date(s) Administered     FLU 6-35 months 01/15/2018     Influenza Vaccine IM > 6 months Valent " IIV4 10/19/2018, 02/08/2021     TDAP Vaccine (Boostrix) 10/19/2018       Lab Results   Component Value Date    PAP NIL 02/23/2018       Recent Labs   Lab Test 09/01/20  1201 02/23/18  0913   A1C  --  5.2   LDL  --  77   HDL  --  75   TRIG  --  44   CR 0.70  --    GFRESTIMATED >90  --    GFRESTBLACK >90  --    POTASSIUM 4.1  --    TSH  --  1.54       PHQ-2 ( 1999 Pfizer) 2/8/2021 9/1/2020   Q1: Little interest or pleasure in doing things 1 1   Q2: Feeling down, depressed or hopeless 0 0   PHQ-2 Score 1 1   Q1: Little interest or pleasure in doing things - -   Q2: Feeling down, depressed or hopeless - -   PHQ-2 Score - -       PHQ-9 SCORE 10/15/2019 1/14/2020 9/1/2020 2/8/2021   PHQ-9 Total Score MyChart - - - -   PHQ-9 Total Score 4 3 4 4       VESNA-7 SCORE 9/1/2020 2/8/2021 3/8/2021   Total Score - - 4 (minimal anxiety)   Total Score 6 4 4       No flowsheet data found.    Chen Lind    April 20, 2021 2:54 PM

## 2021-04-20 NOTE — PROGRESS NOTES
Subjective     CC: Sherita Glasgow  is a 35 year old female who presents to clinic today for the following health issues:   Chief Complaint   Patient presents with     Sinus Problem     Pressure, used a netipot, tried taking allergy medication     Cough     Turned into a cough, after having a runny nose      HPI: 35 year-old female 20 weeks pregnant presents with URI symptoms, sinus pressure and headache.  Onset of symptoms 6 days ago, gradual worsening over weekend: head and nasal congestion with intense headache on days 2 and 3.  Feels like head is full of mucus, nose not draining as much but post nasal drainage and cough has developed, some worsening with lying down. Has migraine headache history sometimes accompanied by nausea and vomiting as these were. Because of pregnancy, not using triptan. Did not improve to acetaminophen but lessened over time. Still has low grade headache now. Symptoms are not improving at day 6.  Feels worse today than yesterday. Denies fever or chills, does note frontal headache but no teeth pain.Tried antihistamine without noticeable improvement and using Netti pot for sinus lavage.  Thinks maybe has developed some allergies over the years but has not been diagnosed and has not regularly t taken medication.   Has generally had more nausea and vomiting this pregnancy than with previous.  Denies history of asthma.   Got Moderna vaccine 2 weeks ago. No known exposures to COVID or other viral infection.   Son (2 1/2 years) is in  and has mild symptoms past day.     History:  In the 14 days before your symptoms started, have you had close contact with a COVID-19 (Coronavirus) patient? No    In the 14 days before your symptoms started, have you traveled internationally or to a state with high rates of COVID19? No    Do you have a fever? No    Are you having difficulty breathing? No    Do you have a cough? Yes, I am coughing up mucus.    Are you experiencing any of the  following? Coughing more when lying down    What other symptoms have you experienced? Runny Nose, Blocked Sinuses and Headache    Do you have any of the following? Fatigue    Have you ever been diagnosed with asthma, bronchitis, or lung disease? No    Do you smoke? No     Have you ever smoked? I have never smoked       Are there people you know with similar symptoms? Yes     What was the patient(s) diagnosed with? Cold symptoms    Have you recently been hospitalized? No    Are you pregnant or breastfeeding?: yes      Patient Active Problem List   Diagnosis     Migraine without aura and without status migrainosus, not intractable     Adjustment disorder with mixed anxiety and depressed mood     Supervision of other normal pregnancy     Past Surgical History:   Procedure Laterality Date     ANKLE SURGERY  2008     COLONOSCOPY  2009    normal result; done due to digestive issues     HERNIA REPAIR  1994     ORTHOPEDIC SURGERY  2008    Right ankle       Social History     Tobacco Use     Smoking status: Never Smoker     Smokeless tobacco: Never Used   Substance Use Topics     Alcohol use: Not Currently     Alcohol/week: 2.0 standard drinks     Comment: 3-5 drinks/week     Family History   Problem Relation Age of Onset     Lung Cancer Paternal Grandmother         nonsmoker     Breast Cancer Paternal Grandmother         over age 50     Alzheimer Disease Paternal Grandfather      Colon Cancer Paternal Aunt      Coronary Artery Disease Early Onset No family hx of      Hypertension No family hx of      Hyperlipidemia No family hx of      Diabetes No family hx of      Melanoma No family hx of      Skin Cancer No family hx of      Glaucoma No family hx of      Macular Degeneration No family hx of          Current Outpatient Medications   Medication Sig Dispense Refill     Omega-3 Fatty Acids (OMEGA 3 500 PO)        ondansetron (ZOFRAN-ODT) 4 MG ODT tab Take 1 tablet (4 mg) by mouth every 6 hours as needed for nausea 15 tablet 0  "    Prenatal Vit-Fe Fumarate-FA (PRENATAL MULTIVITAMIN PLUS IRON) 27-0.8 MG TABS per tablet Take 1 tablet by mouth daily       rizatriptan (MAXALT) 10 MG tablet        VITAMIN D, CHOLECALCIFEROL, PO Take 2,000 Units by mouth daily       No Known Allergies  BP Readings from Last 3 Encounters:   04/20/21 112/71   03/12/21 114/77   02/08/21 111/77    Wt Readings from Last 3 Encounters:   04/20/21 63.5 kg (140 lb 1.6 oz)   03/12/21 59.4 kg (131 lb)   02/08/21 59.7 kg (131 lb 9.6 oz)          Histories, medications reviewed and updated as needed this visit by Provider                 Review of Systems   CONSTITUTIONAL: NEGATIVE for fever, chills, change in weight  INTEGUMENTARY/SKIN: NEGATIVE for worrisome rashes, moles or lesions  EYES: NEGATIVE for vision changes or irritation  ENT/MOUTH: Nasal and head congestion,post nasal drainage. Denies ear pain, throat pain  RESP:cough as per HPI  CV: NEGATIVE for chest pain, palpitations or peripheral edema  GI: Nausea with recent headaches  MUSCULOSKELETAL: NEGATIVE for significant arthralgias or myalgia  NEURO: NEGATIVE for weakness, dizziness or paresthesias      Objective    /71   Pulse 86   Temp 98.4  F (36.9  C) (Oral)   Ht 1.679 m (5' 6.1\")   Wt 63.5 kg (140 lb 1.6 oz)   LMP 11/29/2020   SpO2 96%   BMI 22.54 kg/m      GEN: alert female,no acute distress. Talkative.   HEENT: Eyes clear,no irritation or discharge, CHIQUI.  Ears; TMs gray with LR bilaterally. Nose: edematous turbinates, R>L, small amount white mucoid discharge. Slight tenderness right maxillary sinus.  OP:mild erythema posterior pharynx, no tonsillar enlargement or exudate. Some post nasal drainage noted. Oral mucosa pink, moist   NECK: supple. Lymph: few palpable nontender lymph nodes  LUNGS: clear to auscultation with air entry throughout  CV: HRRR, S1 S2 noMRG  SKIN: no visible lesions or rash.   Diagnostic Test Results:  Labs reviewed in Epic  COVID test ordered. Discussed influenza testing, " patient declined. Symptoms less likely for influenza.          Assessment/Plan:  1. Upper respiratory tract infection, unspecified type  Continue Netti pot, comfort measures. May try OTC Mucinex (guaifenesin), steaming, acetaminophen every 6 hours as needed for symptoms. Seek care if intractable headache. Contact us if symptoms not improving by day 10 or worsening. Not evidence to treat sinus infection today: no fever, not classic symptoms. May benefit from conservative treatment, should see improvement in next few days. Will contact us if she does not. Maintain hydration.  - Symptomatic COVID-19 Virus (Coronavirus) by PCR; Future    2. Cough  As above.  - Symptomatic COVID-19 Virus (Coronavirus) by PCR; Future         BINH Landon CNP

## 2021-04-20 NOTE — TELEPHONE ENCOUNTER
Sherita is still having sinus congestion, headache and feels under the weather.  She has tried allergy meds and neti pot with minimal relief.  Has not tried sudafed yet.  Discussed possible covid testing, and eval in urgent care or PCP.

## 2021-04-27 ENCOUNTER — TELEPHONE (OUTPATIENT)
Dept: OBGYN | Facility: CLINIC | Age: 36
End: 2021-04-27

## 2021-04-27 NOTE — TELEPHONE ENCOUNTER
Pt called with nausea and vomiting x1 4/25/21.  Pt concerned that she has not been feeling completely better.  Pt stated that she was able to eat toast and drink seltzer water.  Pt was denies any other symptoms.  This writer suggest that she take the zofran that she was prescribed.  Discussed with pt that zofran can cause constipation. Pt verbalized understanding.  Pt agreed to call back if she was unable to get an liquids down.  Pt agreed to the plan and verbalized understanding.

## 2021-05-10 ENCOUNTER — OFFICE VISIT (OUTPATIENT)
Dept: OBGYN | Facility: CLINIC | Age: 36
End: 2021-05-10
Attending: ADVANCED PRACTICE MIDWIFE
Payer: COMMERCIAL

## 2021-05-10 VITALS
BODY MASS INDEX: 22.69 KG/M2 | HEART RATE: 90 BPM | WEIGHT: 141 LBS | SYSTOLIC BLOOD PRESSURE: 103 MMHG | DIASTOLIC BLOOD PRESSURE: 68 MMHG

## 2021-05-10 DIAGNOSIS — Z34.82 NORMAL PREGNANCY IN MULTIGRAVIDA IN SECOND TRIMESTER: Primary | ICD-10-CM

## 2021-05-10 PROCEDURE — G0463 HOSPITAL OUTPT CLINIC VISIT: HCPCS

## 2021-05-10 PROCEDURE — 99207 PR PRENATAL VISIT: CPT | Performed by: ADVANCED PRACTICE MIDWIFE

## 2021-05-10 NOTE — LETTER
5/10/2021       RE: Sherita Glasgow  [address]  [facility] [address]     Dear Colleague,    Thank you for referring your patient, Sherita Glasgow, to the [facility] [place] at [facility]. Please see a copy of my visit note below.    Subjective:      35 year old  at 23w1d presents for a routine prenatal appointment.       Denies vaginal bleeding or leakage of fluid.  Denies contractions or cramping.    Reports positive fetal movement.       No HA, visual changes, RUQ or epigastric pain.   The patient presents with the following concerns:     Reports bump on left groin that is painful to touch. Wonders if it could be an ingrown hair. Has decreased in size since it appeared about 2 weeks ago.    Had sinus infection and vomiting about 3 weeks ago. Was seen at [facility] for care and says symptoms have now resolved.     Received 2nd dose of Moderna COVID vaccine 2021. Reports she is happy to be vaccinated and feeling better after mild post vaccine side effects.      Level II US  Results reviewed normal scan.      Objective:  Vitals:    05/10/21 1007   BP: 103/68   BP Location: Left arm   Patient Position: Chair   Pulse: 90   Weight: 64 kg (141 lb)     See OB flowsheet    Exam:  Small bump palpated in left groin area, about 0.5cm in size. No redness noted.     Assessment/Plan     Encounter Diagnosis   Name Primary?     Normal pregnancy in multigravida in second trimester Yes         - Discussed bump in groin could be enlarged lymph node due to timing with sinus infection or possible inflamed hair follicle that is not visible at surface. Monitor and contact if symptoms worsen.  - Patient prefers CNM team for care.   - Reviewed total weight gain, encouraged continued healthy diet and exercise.      - Reviewed why/how to contact provider.    Patient education/orders or handouts today:  PTL signs/symptoms and Plan for EOB visit  w labs   Return to clinic in 4 weeks and prn if questions or concerns.     I, Dara DESOUZA, am serving as a scribe; to document services personally performed by  Dina PURCELL CNM based on data collection and the provider's statements to me.     Dara DESOUZA  I agree with the PFSH and ROS as completed by Dara DESOUZA except for changes made by me. The remainder of the encounter was performed by me and scribed by LYLY Henderson. The scribed note accurately reflects my personal services and decisions made by me.   BINH Cummins CNM

## 2021-05-10 NOTE — PROGRESS NOTES
Subjective:      35 year old  at 23w1d presents for a routine prenatal appointment.       Denies vaginal bleeding or leakage of fluid.  Denies contractions or cramping.    Reports positive fetal movement.       No HA, visual changes, RUQ or epigastric pain.   The patient presents with the following concerns:     Reports bump on left groin that is painful to touch. Wonders if it could be an ingrown hair. Has decreased in size since it appeared about 2 weeks ago.    Had sinus infection and vomiting about 3 weeks ago. Was seen at [facility] for care and says symptoms have now resolved.     Received 2nd dose of Moderna COVID vaccine 2021. Reports she is happy to be vaccinated and feeling better after mild post vaccine side effects.      Level II US  Results reviewed normal scan.      Objective:  Vitals:    05/10/21 1007   BP: 103/68   BP Location: Left arm   Patient Position: Chair   Pulse: 90   Weight: 64 kg (141 lb)     See OB flowsheet    Exam:  Small bump palpated in left groin area, about 0.5cm in size. No redness noted.     Assessment/Plan     Encounter Diagnosis   Name Primary?     Normal pregnancy in multigravida in second trimester Yes         - Discussed bump in groin could be enlarged lymph node due to timing with sinus infection or possible inflamed hair follicle that is not visible at surface. Monitor and contact if symptoms worsen.  - Patient prefers CNM team for care.   - Reviewed total weight gain, encouraged continued healthy diet and exercise.      - Reviewed why/how to contact provider.    Patient education/orders or handouts today:  PTL signs/symptoms and Plan for EOB visit w labs   Return to clinic in 4 weeks and prn if questions or concerns.     I, Dara DESOUZA, am serving as a scribe; to document services personally performed by  Dina PURCELL CNM based on data collection and the provider's statements to me.     Dara DESOUZA  I agree with the PFSH and ROS as completed  by Dara DESOUZA except for changes made by me. The remainder of the encounter was performed by me and scribed by LYLY Henderson. The scribed note accurately reflects my personal services and decisions made by me.   BINH Cummins CNM

## 2021-05-10 NOTE — NURSING NOTE
Chief Complaint   Patient presents with     Prenatal Care     23w1d       See Cecilio, CMA 5/10/2021

## 2021-06-10 DIAGNOSIS — G43.009 MIGRAINE WITHOUT AURA AND WITHOUT STATUS MIGRAINOSUS, NOT INTRACTABLE: Primary | ICD-10-CM

## 2021-06-10 RX ORDER — RIZATRIPTAN BENZOATE 10 MG/1
10 TABLET ORAL
Qty: 60 TABLET | Refills: 3 | Status: SHIPPED | OUTPATIENT
Start: 2021-06-10 | End: 2021-09-01

## 2021-06-10 NOTE — TELEPHONE ENCOUNTER
Refill request received for Maxalt. Med is reported by patient. Pended and routed to CNM team to review.

## 2021-06-15 ENCOUNTER — OFFICE VISIT (OUTPATIENT)
Dept: OBGYN | Facility: CLINIC | Age: 36
End: 2021-06-15
Attending: ADVANCED PRACTICE MIDWIFE
Payer: COMMERCIAL

## 2021-06-15 VITALS
BODY MASS INDEX: 23.61 KG/M2 | HEART RATE: 91 BPM | SYSTOLIC BLOOD PRESSURE: 97 MMHG | HEIGHT: 66 IN | DIASTOLIC BLOOD PRESSURE: 65 MMHG | WEIGHT: 146.9 LBS

## 2021-06-15 DIAGNOSIS — Z34.80 SUPERVISION OF OTHER NORMAL PREGNANCY: ICD-10-CM

## 2021-06-15 DIAGNOSIS — Z34.83 NORMAL PREGNANCY IN MULTIGRAVIDA IN THIRD TRIMESTER: Primary | ICD-10-CM

## 2021-06-15 DIAGNOSIS — F41.9 ANXIETY: ICD-10-CM

## 2021-06-15 LAB
DEPRECATED CALCIDIOL+CALCIFEROL SERPL-MC: 46 UG/L (ref 20–75)
ERYTHROCYTE [DISTWIDTH] IN BLOOD BY AUTOMATED COUNT: 12.7 % (ref 10–15)
GLUCOSE 1H P 50 G GLC PO SERPL-MCNC: 106 MG/DL (ref 60–129)
HCT VFR BLD AUTO: 34.4 % (ref 35–47)
HGB BLD-MCNC: 11.9 G/DL (ref 11.7–15.7)
MCH RBC QN AUTO: 31.6 PG (ref 26.5–33)
MCHC RBC AUTO-ENTMCNC: 34.6 G/DL (ref 31.5–36.5)
MCV RBC AUTO: 91 FL (ref 78–100)
PLATELET # BLD AUTO: 142 10E9/L (ref 150–450)
RBC # BLD AUTO: 3.77 10E12/L (ref 3.8–5.2)
T PALLIDUM AB SER QL: NONREACTIVE
WBC # BLD AUTO: 6.5 10E9/L (ref 4–11)

## 2021-06-15 PROCEDURE — 99207 PR PRENATAL VISIT: CPT | Performed by: ADVANCED PRACTICE MIDWIFE

## 2021-06-15 PROCEDURE — 90471 IMMUNIZATION ADMIN: CPT

## 2021-06-15 PROCEDURE — 90715 TDAP VACCINE 7 YRS/> IM: CPT

## 2021-06-15 PROCEDURE — 36415 COLL VENOUS BLD VENIPUNCTURE: CPT | Performed by: ADVANCED PRACTICE MIDWIFE

## 2021-06-15 PROCEDURE — 82306 VITAMIN D 25 HYDROXY: CPT | Performed by: ADVANCED PRACTICE MIDWIFE

## 2021-06-15 PROCEDURE — G0463 HOSPITAL OUTPT CLINIC VISIT: HCPCS | Mod: 25

## 2021-06-15 PROCEDURE — 85027 COMPLETE CBC AUTOMATED: CPT | Performed by: ADVANCED PRACTICE MIDWIFE

## 2021-06-15 PROCEDURE — 82950 GLUCOSE TEST: CPT | Performed by: ADVANCED PRACTICE MIDWIFE

## 2021-06-15 PROCEDURE — 250N000011 HC RX IP 250 OP 636

## 2021-06-15 PROCEDURE — 86780 TREPONEMA PALLIDUM: CPT | Performed by: ADVANCED PRACTICE MIDWIFE

## 2021-06-15 RX ORDER — CITALOPRAM HYDROBROMIDE 20 MG/1
20 TABLET ORAL DAILY
Qty: 90 TABLET | Refills: 3 | Status: SHIPPED | OUTPATIENT
Start: 2021-06-15 | End: 2021-08-31

## 2021-06-15 RX ORDER — CITALOPRAM HYDROBROMIDE 10 MG/1
10 TABLET ORAL DAILY
Qty: 7 TABLET | Refills: 0 | Status: SHIPPED | OUTPATIENT
Start: 2021-06-15 | End: 2021-08-31

## 2021-06-15 ASSESSMENT — MIFFLIN-ST. JEOR: SCORE: 1389.67

## 2021-06-15 ASSESSMENT — PAIN SCALES - GENERAL: PAINLEVEL: NO PAIN (0)

## 2021-06-15 NOTE — LETTER
"6/15/2021       RE: Sherita Glasgow  [address]  [facility] [address]     Dear Colleague,    Thank you for referring your patient, Sherita Glasgow, to the [facility] [place] at [facility]. Please see a copy of my visit note below.     35 year old, , 28w2d,   The patient presents with the following concerns: Continues to have anxiety.  States she becomes more anxious with travel and extended family dynamics.  Had previously been prescribed Citalopram, but did not start taking the medication.  Would like to start now.  Prescription sent and instructed on use.  Patient is also wondering if an \"as needed\" medication might be helpful, will help arrange a telephone visit with Dr. Rayo to explore further.     PHQ-9 SCORE 2020   PHQ-9 Total Score MyChart - - -   PHQ-9 Total Score 3 4 4     Education completed today includes breast feeding, [facility] hand out , contraception, counting movements, signs of pre-term labor, when to present to birthplace, post partum depression, GBS, getting enough iron and labor induction.  Birth preferences reviewed: Un-Medicated and Water birth  Labor support:   and     Feeding plans :    Contraception planned:  condoms  The following labs were ordered today:   GCT, CBC w platelets, Vitamin d, Anti-treponema,    Water birth consent form was signed today  Blood type:   ABO   Date Value Ref Range Status   2021 A  Final     RH(D)   Date Value Ref Range Status   2021 Pos  Final     Antibody Screen   Date Value Ref Range Status   2021 Neg  Final     TDAP  Was given.  A/P:  Encounter Diagnoses   Name Primary?     Normal pregnancy in multigravida in third trimester Yes     Anxiety      Supervision of other normal pregnancy      Orders Placed This Encounter   Procedures     TDAP VACCINE (Adacel, Boostrix)  [[id number]]     Glucose 1 Hour     25- " [place]-Vitamin D     Treponema Abs w Reflex to RPR and Titer     CBC with platelets     Orders Placed This Encounter   Medications     citalopram (CELEXA) 10 MG tablet     Sig: Take 1 tablet (10 mg) by mouth daily     Dispense:  7 tablet     Refill:  0     citalopram (CELEXA) 20 MG tablet     Sig: Take 1 tablet (20 mg) by mouth daily     Dispense:  90 tablet     Refill:  3     Continue scheduled prenatal care  BINH Cummins CNM

## 2021-06-15 NOTE — PROGRESS NOTES
" 35 year old, , 28w2d,   The patient presents with the following concerns: Continues to have anxiety.  States she becomes more anxious with travel and extended family dynamics.  Had previously been prescribed Citalopram, but did not start taking the medication.  Would like to start now.  Prescription sent and instructed on use.  Patient is also wondering if an \"as needed\" medication might be helpful, will help arrange a telephone visit with Dr. Rayo to explore further.     PHQ-9 SCORE 2020   PHQ-9 Total Score MyChart - - -   PHQ-9 Total Score 3 4 4     Education completed today includes breast feeding, [facility] hand out , contraception, counting movements, signs of pre-term labor, when to present to birthplace, post partum depression, GBS, getting enough iron and labor induction.  Birth preferences reviewed: Un-Medicated and Water birth  Labor support:   and     Feeding plans :    Contraception planned:  condoms  The following labs were ordered today:   GCT, CBC w platelets, Vitamin d, Anti-treponema,    Water birth consent form was signed today  Blood type:   ABO   Date Value Ref Range Status   2021 A  Final     RH(D)   Date Value Ref Range Status   2021 Pos  Final     Antibody Screen   Date Value Ref Range Status   2021 Neg  Final     TDAP  Was given.  A/P:  Encounter Diagnoses   Name Primary?     Normal pregnancy in multigravida in third trimester Yes     Anxiety      Supervision of other normal pregnancy      Orders Placed This Encounter   Procedures     TDAP VACCINE (Adacel, Boostrix)  [[id number]]     Glucose 1 Hour     25- OH-Vitamin D     Treponema Abs w Reflex to RPR and Titer     CBC with platelets     Orders Placed This Encounter   Medications     citalopram (CELEXA) 10 MG tablet     Sig: Take 1 tablet (10 mg) by mouth daily     Dispense:  7 tablet     Refill:  0     citalopram (CELEXA) 20 MG tablet     Sig: Take 1 tablet (20 mg) by " mouth daily     Dispense:  90 tablet     Refill:  3     Continue scheduled prenatal care  BINH Cummins CNM

## 2021-07-01 ENCOUNTER — TELEPHONE (OUTPATIENT)
Dept: PHARMACY | Facility: CLINIC | Age: 36
End: 2021-07-01

## 2021-07-01 NOTE — TELEPHONE ENCOUNTER
[facility] Call Center    Phone Message    May a detailed message be left on voicemail: yes     Reason for Call: Other: Sherita calling to schedule a telephone appointment with Brittani Rayo. Rashaad give Sherita a call to discuss scheduling options. Thank you!     Action Taken: Message routed to:  Other: S     Travel Screening: Not Applicable

## 2021-07-02 ENCOUNTER — OFFICE VISIT (OUTPATIENT)
Dept: OBGYN | Facility: CLINIC | Age: 36
End: 2021-07-02
Attending: ADVANCED PRACTICE MIDWIFE
Payer: COMMERCIAL

## 2021-07-02 VITALS
DIASTOLIC BLOOD PRESSURE: 67 MMHG | HEART RATE: 76 BPM | HEIGHT: 66 IN | BODY MASS INDEX: 23.33 KG/M2 | SYSTOLIC BLOOD PRESSURE: 102 MMHG | WEIGHT: 145.2 LBS

## 2021-07-02 DIAGNOSIS — Z34.80 SUPERVISION OF OTHER NORMAL PREGNANCY: Primary | ICD-10-CM

## 2021-07-02 DIAGNOSIS — F43.23 ADJUSTMENT DISORDER WITH MIXED ANXIETY AND DEPRESSED MOOD: ICD-10-CM

## 2021-07-02 DIAGNOSIS — D69.6 THROMBOCYTOPENIA (H): ICD-10-CM

## 2021-07-02 PROCEDURE — 99207 PR PRENATAL VISIT: CPT | Performed by: ADVANCED PRACTICE MIDWIFE

## 2021-07-02 PROCEDURE — G0463 HOSPITAL OUTPT CLINIC VISIT: HCPCS

## 2021-07-02 ASSESSMENT — PAIN SCALES - GENERAL: PAINLEVEL: NO PAIN (0)

## 2021-07-02 ASSESSMENT — MIFFLIN-ST. JEOR: SCORE: 1378.88

## 2021-07-02 NOTE — TELEPHONE ENCOUNTER
Patient has an appointment with Brittani Rayo on 7/13 already scheduled. stated patient should call back with any questions or to schedule additional appointments with Brittani Rayo.    Chantal Mckee  Clinical Services Assistant

## 2021-07-02 NOTE — LETTER
"2021       RE: Sherita Glasgow  [address]  [facility] [address]     Dear Colleague,    Thank you for referring your patient, Sherita Glasgow, to the [facility] [place] at [facility]. Please see a copy of my visit note below.    Subjective:      35 year old  at 30w5d presentst for a routine prenatal appointment.    no vaginal bleeding or leakage of fluid.  no contractions. pos fetal movement.       No HA, visual changes, RUQ or epigastric pain.   Patient concerns:    Feeling well overall. Here w . Working FT, at home  Reviewed EOB labs with patient.  Reviewed TDAP Previously given  Objective:  Vitals:    21 1152   BP: 102/67   Pulse: 76   Weight: 65.9 kg (145 lb 3.2 oz)   Height: 1.674 m (5' 5.91\")   , see ob flowsheet  Assessment/Plan     Encounter Diagnoses   Name Primary?     Supervision of other normal pregnancy Yes     Adjustment disorder with mixed anxiety and depressed mood      Thrombocytopenia (H)      No orders of the defined types were placed in this encounter.    No orders of the defined types were placed in this encounter.    ABO   Date Value Ref Range Status   2021 A  Final     RH(D)   Date Value Ref Range Status   2021 Pos  Final     Antibody Screen   Date Value Ref Range Status   2021 Neg  Final   , Rhogam  was notgiven.    - Reviewed total weight gain, encouraged continued healthy diet and exercise.  .  Reviewed importance of daily fetal kick count and why/how to contact provider.    - Reviewed why/how to contact provider if headache/visual changes/RUQ or epigastric pain, decreased fetal movement, vaginal bleeding, leakage of fluid or more than 4 contractions in an hour.     Patient education/orders or handouts today:  PTL signs/symptoms  Reviewed GBS screening at 36-37 wks.  Encouraged to work w therapist for thoughtful decision about meds, consider starting med prior to birth " is she has more concerns about pp period  Return to clinic in 2 weeks and prn if questions or concerns.     Candelaria Vang, APRN CNM

## 2021-07-02 NOTE — PROGRESS NOTES
"Subjective:      35 year old  at 30w5d presentst for a routine prenatal appointment.    no vaginal bleeding or leakage of fluid.  no contractions. pos fetal movement.       No HA, visual changes, RUQ or epigastric pain.   Patient concerns:    Feeling well overall. Here w . Working FT, at home  Reviewed EOB labs with patient.  Reviewed TDAP Previously given  Objective:  Vitals:    21 1152   BP: 102/67   Pulse: 76   Weight: 65.9 kg (145 lb 3.2 oz)   Height: 1.674 m (5' 5.91\")   , see ob flowsheet  Assessment/Plan     Encounter Diagnoses   Name Primary?     Supervision of other normal pregnancy Yes     Adjustment disorder with mixed anxiety and depressed mood      Thrombocytopenia (H)      No orders of the defined types were placed in this encounter.    No orders of the defined types were placed in this encounter.    ABO   Date Value Ref Range Status   2021 A  Final     RH(D)   Date Value Ref Range Status   2021 Pos  Final     Antibody Screen   Date Value Ref Range Status   2021 Neg  Final   , Rhogam  was notgiven.    - Reviewed total weight gain, encouraged continued healthy diet and exercise.  .  Reviewed importance of daily fetal kick count and why/how to contact provider.    - Reviewed why/how to contact provider if headache/visual changes/RUQ or epigastric pain, decreased fetal movement, vaginal bleeding, leakage of fluid or more than 4 contractions in an hour.     Patient education/orders or handouts today:  PTL signs/symptoms  Reviewed GBS screening at 36-37 wks.  Encouraged to work w therapist for thoughtful decision about meds, consider starting med prior to birth is she has more concerns about pp period  Return to clinic in 2 weeks and prn if questions or concerns.     Candelaria Vang, APRN VLADISLAV      "

## 2021-07-13 ENCOUNTER — VIRTUAL VISIT (OUTPATIENT)
Dept: PHARMACY | Facility: CLINIC | Age: 36
End: 2021-07-13
Payer: COMMERCIAL

## 2021-07-13 DIAGNOSIS — G43.009 MIGRAINE WITHOUT AURA AND WITHOUT STATUS MIGRAINOSUS, NOT INTRACTABLE: ICD-10-CM

## 2021-07-13 DIAGNOSIS — Z34.80 SUPERVISION OF OTHER NORMAL PREGNANCY: ICD-10-CM

## 2021-07-13 DIAGNOSIS — F43.23 ADJUSTMENT DISORDER WITH MIXED ANXIETY AND DEPRESSED MOOD: Primary | ICD-10-CM

## 2021-07-13 PROCEDURE — 99607 MTMS BY PHARM ADDL 15 MIN: CPT | Performed by: PHARMACIST

## 2021-07-13 PROCEDURE — 99605 MTMS BY PHARM NP 15 MIN: CPT | Performed by: PHARMACIST

## 2021-07-13 RX ORDER — ACETAMINOPHEN 500 MG
1000 TABLET ORAL EVERY 6 HOURS PRN
COMMUNITY
End: 2022-01-28

## 2021-07-13 NOTE — PROGRESS NOTES
Medication Therapy Management (MTM) Encounter    ASSESSMENT:                            Medication Adherence/Access: No issues identified    Anxiety:  Patient would likely benefit from starting the citalopram as prescribed, with 10 mg daily x 1 week, then 20 mg daily.  Starting now may help protect again worsening anxiety post-partum.  Headache: Stable;  Use of rizatriptan is appropriate and has reassuring safety data in pregnancy.  Improvement in anxiety will likely lead to less headaches.  Supplements: no concerns today;  Continue follow-up as planned with CNM's.      PLAN:                            Patient was educated on the followin.  We discussed that there are benefits to starting citalopram in pregnancy, including the potential to decrease the risk of worsening anxiety post-partum.  Antidepressant use in pregnancy has been associated with withdrawal symptoms in the , but these are typically mild and resolve in 1-2 weeks.   We also discussed that very small amounts of citalopram gets into the breast milk, and it is generally considered safe to breastfeed while on this medication.      2.  We also discussed that treatment can be continue ongoing if needed;  We usually treat for at least 6 months, but you may choose to continue on it indefinitely if needed.          Follow-up: as needed;  Continue to see CNM team.      SUBJECTIVE/OBJECTIVE:                          Sherita Glasgow is a 35 year old female called for an initial visit. She was referred to me from Dina Liz CNM.      Reason for visit: anxiety in pregnancy.    Allergies/ADRs: Reviewed in chart  Tobacco: She reports that she has never smoked. She has never used smokeless tobacco.  Alcohol: not currently using  Past Medical History: Reviewed in chart      Medication Adherence/Access: no issues reported    Anxiety: Patient has been reporting increased anxiety during this pregnancy.   Stress -- toddler; travel; family dynamics in  June -- generally has been better since a June family trip which was anxiety-producing.  Feels like anxiety is worse -- not sure if it's pregnancy, work, coming out of pandemic, etc.  Headaches are common when stressed;  Had daily headaches with June trip.  [place] family vacation trip and having headaches.  Has a precription for citalopram that she picked up from the pharmacy;  Is wondering, should she wait until post-partum, or should she start it now.  Has been able to manage anxiety on her own in the past, but feels this isn't enough anymore.    Also wondering about how long to be on citalopram;  She was prescribed it post-partum after last pregnancy, but she never took it.    Headaches: has used rizatriptan ~8 times this summer -- clumped while traveling due to increased anxiety.  Will try acetaminophen first, but it is not always effective.  Pregnancy: 32w3d, following with CNM team for care.  Currently on omega 3, PNV, Vitamin D.          Today's Vitals: LMP 11/29/2020   ----------------      I spent 27 minutes with this patient today.  A copy of the visit note was provided to the patient's referring provider.    The patient was sent via [employer] a summary of these recommendations.     Brittani Rayo, Pharm.D., [facility]      Telemedicine Visit Details  Type of service:  Telephone visit  Start Time: 8:31 AM  End Time: 8:58 AM  Originating Location (patient location): Home  Distant Location (provider location):  [facility]      Medication Therapy Recommendations  Adjustment disorder with mixed anxiety and depressed mood    Current Medication: citalopram (CELEXA) 10 MG tablet   Rationale: Patient prefers not to take - Adherence - Adherence   Recommendation: Provide Education   Status: Patient Agreed - Adherence/Education

## 2021-07-14 NOTE — PATIENT INSTRUCTIONS
Recommendations from today's MTM visit:                                                    MTM (medication therapy management) is a service provided by a clinical pharmacist designed to help you get the most of out of your medicines.      1.  We discussed that there are benefits to starting citalopram in pregnancy, including the potential to decrease the risk of worsening anxiety post-partum.  Antidepressant use in pregnancy has been associated with withdrawal symptoms in the , but these are typically mild and resolve in 1-2 weeks.   We also discussed that very small amounts of citalopram gets into the breast milk, and it is generally considered safe to breastfeed while on this medication.      2.  We also discussed that treatment can be continued ongoing if needed;  We usually treat for at least 6 months, but you may choose to continue on it indefinitely if needed    Follow-up: as needed;  Continue to see midwife team as scheduled.    It was great to speak with you today.  I value your experience and would be very thankful for your time with providing feedback on our clinic survey. You may receive a survey via email or text message in the next few days.     To schedule another MTM appointment, please call the clinic directly or you may call the MTM scheduling line at [phone] or toll-free at [phone].     My Clinical Pharmacist's contact information:                                                      Please feel free to contact me with any questions or concerns you have.      Brittani Rayo, Pharm.D., [facility]  Phone:  [phone]

## 2021-07-23 ENCOUNTER — OFFICE VISIT (OUTPATIENT)
Dept: OBGYN | Facility: CLINIC | Age: 36
End: 2021-07-23
Attending: ADVANCED PRACTICE MIDWIFE
Payer: COMMERCIAL

## 2021-07-23 VITALS
WEIGHT: 151.1 LBS | SYSTOLIC BLOOD PRESSURE: 111 MMHG | HEIGHT: 65 IN | HEART RATE: 77 BPM | BODY MASS INDEX: 25.18 KG/M2 | DIASTOLIC BLOOD PRESSURE: 73 MMHG

## 2021-07-23 DIAGNOSIS — D69.6 THROMBOCYTOPENIA (H): ICD-10-CM

## 2021-07-23 DIAGNOSIS — Z34.93 NORMAL PREGNANCY IN THIRD TRIMESTER: Primary | ICD-10-CM

## 2021-07-23 LAB
ERYTHROCYTE [DISTWIDTH] IN BLOOD BY AUTOMATED COUNT: 12.8 % (ref 10–15)
HCT VFR BLD AUTO: 35.6 % (ref 35–47)
HGB BLD-MCNC: 12 G/DL (ref 11.7–15.7)
MCH RBC QN AUTO: 30.6 PG (ref 26.5–33)
MCHC RBC AUTO-ENTMCNC: 33.7 G/DL (ref 31.5–36.5)
MCV RBC AUTO: 91 FL (ref 78–100)
PLATELET # BLD AUTO: 134 10E3/UL (ref 150–450)
RBC # BLD AUTO: 3.92 10E6/UL (ref 3.8–5.2)
WBC # BLD AUTO: 8 10E3/UL (ref 4–11)

## 2021-07-23 PROCEDURE — 85027 COMPLETE CBC AUTOMATED: CPT | Performed by: ADVANCED PRACTICE MIDWIFE

## 2021-07-23 PROCEDURE — 99207 PR PRENATAL VISIT: CPT | Performed by: ADVANCED PRACTICE MIDWIFE

## 2021-07-23 PROCEDURE — 36415 COLL VENOUS BLD VENIPUNCTURE: CPT | Performed by: ADVANCED PRACTICE MIDWIFE

## 2021-07-23 PROCEDURE — G0463 HOSPITAL OUTPT CLINIC VISIT: HCPCS

## 2021-07-23 ASSESSMENT — MIFFLIN-ST. JEOR: SCORE: 1391.27

## 2021-07-23 NOTE — LETTER
"2021       RE: Sherita Glasgow  [address]  [facility] [address]     Dear Colleague,    Thank you for referring your patient, Sherita Glasgow, to the [facility] [place] at [facility]. Please see a copy of my visit note below.    Subjective:       35 year old  at 33w5d presentst for a routine prenatal appointment.  Denies vaginal bleeding or leakage of fluid.  Reports some irregular contractions. Reports regular fetal movement.       No HA, visual changes, RUQ or epigastric pain.   Patient concerns: No concerns today.  Feels mood is stable, has decided to not take Citalopram until postpartum.  Consents to CBC today.  Plan position check at next visit.   Feeling well overall.    Objective:  Vitals:    21 1142   BP: 111/73   Pulse: 77   Weight: 68.5 kg (151 lb 1.6 oz)   Height: 1.651 m (5' 5\")   , see ob flowsheet  Assessment/Plan     Encounter Diagnoses   Name Primary?     Thrombocytopenia (H)      Normal pregnancy in third trimester Yes     Orders Placed This Encounter   Procedures     CBC with Platelets       ABO   Date Value Ref Range Status   2021 A  Final     RH(D)   Date Value Ref Range Status   2021 Pos  Final     Antibody Screen   Date Value Ref Range Status   2021 Neg  Final       - Reviewed total weight gain, encouraged continued healthy diet and exercise.  .  Reviewed importance of daily fetal kick count and why/how to contact provider.    - Reviewed why/how to contact provider if headache/visual changes/RUQ or epigastric pain, decreased fetal movement, vaginal bleeding, leakage of fluid or more than 4 contractions in an hour.  CBC today  Plan US for position check with next visit.  Discussed weekly CBC starting next visit.      Return to clinic in 3 weeks and prn if questions or concerns.     BINH Cummins CNM      "

## 2021-07-23 NOTE — PROGRESS NOTES
"Subjective:       35 year old  at 33w5d presentst for a routine prenatal appointment.  Denies vaginal bleeding or leakage of fluid.  Reports some irregular contractions. Reports regular fetal movement.       No HA, visual changes, RUQ or epigastric pain.   Patient concerns: No concerns today.  Feels mood is stable, has decided to not take Citalopram until postpartum.  Consents to CBC today.  Plan position check at next visit.   Feeling well overall.    Objective:  Vitals:    21 1142   BP: 111/73   Pulse: 77   Weight: 68.5 kg (151 lb 1.6 oz)   Height: 1.651 m (5' 5\")   , see ob flowsheet  Assessment/Plan     Encounter Diagnoses   Name Primary?     Thrombocytopenia (H)      Normal pregnancy in third trimester Yes     Orders Placed This Encounter   Procedures     CBC with Platelets       ABO   Date Value Ref Range Status   2021 A  Final     RH(D)   Date Value Ref Range Status   2021 Pos  Final     Antibody Screen   Date Value Ref Range Status   2021 Neg  Final       - Reviewed total weight gain, encouraged continued healthy diet and exercise.  .  Reviewed importance of daily fetal kick count and why/how to contact provider.    - Reviewed why/how to contact provider if headache/visual changes/RUQ or epigastric pain, decreased fetal movement, vaginal bleeding, leakage of fluid or more than 4 contractions in an hour.  CBC today  Plan US for position check with next visit.  Discussed weekly CBC starting next visit.      Return to clinic in 3 weeks and prn if questions or concerns.     BINH Cummins CNM    "

## 2021-08-13 ENCOUNTER — OFFICE VISIT (OUTPATIENT)
Dept: OBGYN | Facility: CLINIC | Age: 36
End: 2021-08-13
Attending: ADVANCED PRACTICE MIDWIFE
Payer: COMMERCIAL

## 2021-08-13 ENCOUNTER — APPOINTMENT (OUTPATIENT)
Dept: LAB | Facility: CLINIC | Age: 36
End: 2021-08-13
Attending: ADVANCED PRACTICE MIDWIFE
Payer: COMMERCIAL

## 2021-08-13 ENCOUNTER — ANCILLARY PROCEDURE (OUTPATIENT)
Dept: ULTRASOUND IMAGING | Facility: CLINIC | Age: 36
End: 2021-08-13
Attending: ADVANCED PRACTICE MIDWIFE
Payer: COMMERCIAL

## 2021-08-13 VITALS
HEIGHT: 65 IN | HEART RATE: 73 BPM | WEIGHT: 152.9 LBS | DIASTOLIC BLOOD PRESSURE: 70 MMHG | BODY MASS INDEX: 25.47 KG/M2 | SYSTOLIC BLOOD PRESSURE: 104 MMHG

## 2021-08-13 DIAGNOSIS — D69.6 THROMBOCYTOPENIA (H): ICD-10-CM

## 2021-08-13 DIAGNOSIS — Z34.93 NORMAL PREGNANCY IN THIRD TRIMESTER: ICD-10-CM

## 2021-08-13 DIAGNOSIS — F43.23 ADJUSTMENT DISORDER WITH MIXED ANXIETY AND DEPRESSED MOOD: ICD-10-CM

## 2021-08-13 DIAGNOSIS — Z34.80 SUPERVISION OF OTHER NORMAL PREGNANCY: Primary | ICD-10-CM

## 2021-08-13 LAB
ERYTHROCYTE [DISTWIDTH] IN BLOOD BY AUTOMATED COUNT: 12.7 % (ref 10–15)
HCT VFR BLD AUTO: 36.7 % (ref 35–47)
HGB BLD-MCNC: 12.8 G/DL (ref 11.7–15.7)
MCH RBC QN AUTO: 31.4 PG (ref 26.5–33)
MCHC RBC AUTO-ENTMCNC: 34.9 G/DL (ref 31.5–36.5)
MCV RBC AUTO: 90 FL (ref 78–100)
PLATELET # BLD AUTO: 134 10E3/UL (ref 150–450)
RBC # BLD AUTO: 4.07 10E6/UL (ref 3.8–5.2)
WBC # BLD AUTO: 8.2 10E3/UL (ref 4–11)

## 2021-08-13 PROCEDURE — 85027 COMPLETE CBC AUTOMATED: CPT | Performed by: ADVANCED PRACTICE MIDWIFE

## 2021-08-13 PROCEDURE — G0463 HOSPITAL OUTPT CLINIC VISIT: HCPCS

## 2021-08-13 PROCEDURE — 36415 COLL VENOUS BLD VENIPUNCTURE: CPT | Performed by: ADVANCED PRACTICE MIDWIFE

## 2021-08-13 PROCEDURE — 76815 OB US LIMITED FETUS(S): CPT | Mod: 26 | Performed by: OBSTETRICS & GYNECOLOGY

## 2021-08-13 PROCEDURE — 99207 PR PRENATAL VISIT: CPT | Performed by: REGISTERED NURSE

## 2021-08-13 PROCEDURE — 76815 OB US LIMITED FETUS(S): CPT

## 2021-08-13 PROCEDURE — 87653 STREP B DNA AMP PROBE: CPT | Performed by: ADVANCED PRACTICE MIDWIFE

## 2021-08-13 RX ORDER — ASPIRIN 81 MG
100 TABLET, DELAYED RELEASE (ENTERIC COATED) ORAL DAILY
Qty: 30 TABLET | Refills: 1 | Status: SHIPPED | OUTPATIENT
Start: 2021-08-13 | End: 2022-01-28

## 2021-08-13 RX ORDER — ACETAMINOPHEN 325 MG/1
650 TABLET ORAL EVERY 6 HOURS PRN
Qty: 100 TABLET | Refills: 0 | Status: SHIPPED | OUTPATIENT
Start: 2021-08-13 | End: 2022-01-28

## 2021-08-13 ASSESSMENT — ANXIETY QUESTIONNAIRES
2. NOT BEING ABLE TO STOP OR CONTROL WORRYING: SEVERAL DAYS
GAD7 TOTAL SCORE: 6
5. BEING SO RESTLESS THAT IT IS HARD TO SIT STILL: NOT AT ALL
1. FEELING NERVOUS, ANXIOUS, OR ON EDGE: MORE THAN HALF THE DAYS
6. BECOMING EASILY ANNOYED OR IRRITABLE: SEVERAL DAYS
3. WORRYING TOO MUCH ABOUT DIFFERENT THINGS: SEVERAL DAYS
7. FEELING AFRAID AS IF SOMETHING AWFUL MIGHT HAPPEN: NOT AT ALL

## 2021-08-13 ASSESSMENT — PAIN SCALES - GENERAL: PAINLEVEL: MODERATE PAIN (4)

## 2021-08-13 ASSESSMENT — PATIENT HEALTH QUESTIONNAIRE - PHQ9: 5. POOR APPETITE OR OVEREATING: SEVERAL DAYS

## 2021-08-13 ASSESSMENT — MIFFLIN-ST. JEOR: SCORE: 1399.43

## 2021-08-13 NOTE — PROGRESS NOTES
"Subjective:      35 year old  at 36w5d presents for a routine prenatal appointment.   no vaginal bleeding,  leakage of fluid, or change in vaginal discharge.  no contractions.   +fetal movement.     No HA, visual changes, RUQ or epigastric pain.   Patient concerns:  Feeling well overall.     -CBC today for gestational thrombocytopenia, patient agreeable   -GBS today, patient agreeable     -new intense lower back, sciatic nerve pain. Left sided mostly but feels pain with each step. Saw PT yesterday. Thinkings its an inflamed joint. Limited mobility since then. Standing or walking is hard since Monday. Stretches and maternity support belt provided by PT. Follow up shceduled for next Monday. At least weekly until deliver. Exercises and ice twice per day.   -ultrasound done today, baby cephalic.   - mood stable: still holding off on celexa for now. Aware she may need this support postpartum. Has support in place. Finishes work next week. Stress from work mostly, limits time to think about preparation for labor and birth. Will follow up prn.     Objective:  Vitals:    21 1348   BP: 104/70   Pulse: 73   Weight: 69.4 kg (152 lb 14.4 oz)   Height: 1.651 m (5' 5\")    See OB flowsheet    Assessment/Plan     Encounter Diagnoses   Name Primary?     Supervision of other normal pregnancy Yes     Thrombocytopenia (H)      Adjustment disorder with mixed anxiety and depressed mood      Orders Placed This Encounter   Procedures     CBC with Platelets     Orders Placed This Encounter   Medications     acetaminophen (TYLENOL) 325 MG tablet     Sig: Take 2 tablets (650 mg) by mouth every 6 hours as needed for mild pain Start after Delivery.     Dispense:  100 tablet     Refill:  0     docusate sodium (COLACE) 100 MG tablet     Sig: Take 1 tablet (100 mg) by mouth daily     Dispense:  30 tablet     Refill:  1     VESNA-7 SCORE 2021 3/8/2021 2021   Total Score - 4 (minimal anxiety) -   Total Score 4 4 6     PHQ " 9/1/2020 2/8/2021 8/13/2021   PHQ-9 Total Score 4 4 -   Q9: Thoughts of better off dead/self-harm past 2 weeks Not at all Not at all Not at all       Birth preferences reviewed: : has; visitor policy re: covid 19 reviewed.   -GBS obtained.  -CBC with plts ordered.  -Reviewed recommended postpartum OTC medication. Pt desires prescription. Prescription for tylenol and colace sent to pt's preferred pharmacy. Reviewed no use of motrin during pregnancy.   -Labor signs discussed. Reinforced daily fetal movement counts.  -Reviewed why/how to contact provider if headache/visual changes/RUQ or epigastric pain, decreased fetal movement, vaginal bleeding, leakage of fluid.    Return to clinic in 1 week and prn if questions or concerns.     BINH Weiss CNM

## 2021-08-13 NOTE — LETTER
"2021       RE: Sherita Glasgow  [address]  [facility] [address]     Dear Colleague,    Thank you for referring your patient, Sherita Glasgow, to the [facility] [place] at [facility]. Please see a copy of my visit note below.    Subjective:      35 year old  at 36w5d presents for a routine prenatal appointment.   no vaginal bleeding,  leakage of fluid, or change in vaginal discharge.  no contractions.   +fetal movement.     No HA, visual changes, RUQ or epigastric pain.   Patient concerns:  Feeling well overall.     -CBC today for gestational thrombocytopenia, patient agreeable   -GBS today, patient agreeable     -new intense lower back, sciatic nerve pain. Left sided mostly but feels pain with each step. Saw PT yesterday. Thinkings its an inflamed joint. Limited mobility since then. Standing or walking is hard since Monday. Stretches and maternity support belt provided by PT. Follow up shceduled for next Monday. At least weekly until deliver. Exercises and ice twice per day.   -ultrasound done today, baby cephalic.   - mood stable: still holding off on celexa for now. Aware she may need this support postpartum. Has support in place. Finishes work next week. Stress from work mostly, limits time to think about preparation for labor and birth. Will follow up prn.     Objective:  Vitals:    21 1348   BP: 104/70   Pulse: 73   Weight: 69.4 kg (152 lb 14.4 oz)   Height: 1.651 m (5' 5\")    See OB flowsheet    Assessment/Plan     Encounter Diagnoses   Name Primary?     Supervision of other normal pregnancy Yes     Thrombocytopenia (H)      Adjustment disorder with mixed anxiety and depressed mood      Orders Placed This Encounter   Procedures     CBC with Platelets     Orders Placed This Encounter   Medications     acetaminophen (TYLENOL) 325 MG tablet     Sig: Take 2 tablets (650 mg) by mouth every 6 hours as needed for mild " pain Start after Delivery.     Dispense:  100 tablet     Refill:  0     docusate sodium (COLACE) 100 MG tablet     Sig: Take 1 tablet (100 mg) by mouth daily     Dispense:  30 tablet     Refill:  1     VESNA-7 SCORE 2/8/2021 3/8/2021 8/13/2021   Total Score - 4 (minimal anxiety) -   Total Score 4 4 6     PHQ 9/1/2020/1/2020 2/8/2021 8/13/2021   PHQ-9 Total Score 4 4 -   Q9: Thoughts of better off dead/self-harm past 2 weeks Not at all Not at all Not at all       Birth preferences reviewed: : has; visitor policy re: covid 19 reviewed.   -GBS obtained.  -CBC with plts ordered.  -Reviewed recommended postpartum OTC medication. Pt desires prescription. Prescription for tylenol and colace sent to pt's preferred pharmacy. Reviewed no use of motrin during pregnancy.   -Labor signs discussed. Reinforced daily fetal movement counts.  -Reviewed why/how to contact provider if headache/visual changes/RUQ or epigastric pain, decreased fetal movement, vaginal bleeding, leakage of fluid.    Return to clinic in 1 week and prn if questions or concerns.     BINH Weiss CNM

## 2021-08-14 LAB
GP B STREP DNA SPEC QL NAA+PROBE: NEGATIVE
PATIENT PENICILLIN, AMOXICILLIN, CEPHALOSPORINS ALLERGY: NO

## 2021-08-14 ASSESSMENT — ANXIETY QUESTIONNAIRES: GAD7 TOTAL SCORE: 6

## 2021-08-20 ENCOUNTER — LAB (OUTPATIENT)
Dept: LAB | Facility: CLINIC | Age: 36
End: 2021-08-20
Attending: ADVANCED PRACTICE MIDWIFE
Payer: COMMERCIAL

## 2021-08-20 ENCOUNTER — OFFICE VISIT (OUTPATIENT)
Dept: OBGYN | Facility: CLINIC | Age: 36
End: 2021-08-20
Attending: ADVANCED PRACTICE MIDWIFE
Payer: COMMERCIAL

## 2021-08-20 VITALS
BODY MASS INDEX: 25.48 KG/M2 | HEART RATE: 86 BPM | DIASTOLIC BLOOD PRESSURE: 78 MMHG | WEIGHT: 153.1 LBS | SYSTOLIC BLOOD PRESSURE: 112 MMHG

## 2021-08-20 DIAGNOSIS — D69.6 THROMBOCYTOPENIA (H): ICD-10-CM

## 2021-08-20 DIAGNOSIS — Z34.80 SUPERVISION OF OTHER NORMAL PREGNANCY: ICD-10-CM

## 2021-08-20 DIAGNOSIS — Z34.80 SUPERVISION OF OTHER NORMAL PREGNANCY: Primary | ICD-10-CM

## 2021-08-20 DIAGNOSIS — F43.23 ADJUSTMENT DISORDER WITH MIXED ANXIETY AND DEPRESSED MOOD: ICD-10-CM

## 2021-08-20 LAB
ERYTHROCYTE [DISTWIDTH] IN BLOOD BY AUTOMATED COUNT: 12.9 % (ref 10–15)
HCT VFR BLD AUTO: 37.1 % (ref 35–47)
HGB BLD-MCNC: 12.6 G/DL (ref 11.7–15.7)
MCH RBC QN AUTO: 30.3 PG (ref 26.5–33)
MCHC RBC AUTO-ENTMCNC: 34 G/DL (ref 31.5–36.5)
MCV RBC AUTO: 89 FL (ref 78–100)
PLATELET # BLD AUTO: 142 10E3/UL (ref 150–450)
RBC # BLD AUTO: 4.16 10E6/UL (ref 3.8–5.2)
WBC # BLD AUTO: 8.9 10E3/UL (ref 4–11)

## 2021-08-20 PROCEDURE — 36415 COLL VENOUS BLD VENIPUNCTURE: CPT

## 2021-08-20 PROCEDURE — 99207 PR PRENATAL VISIT: CPT | Performed by: ADVANCED PRACTICE MIDWIFE

## 2021-08-20 PROCEDURE — 85014 HEMATOCRIT: CPT

## 2021-08-20 PROCEDURE — G0463 HOSPITAL OUTPT CLINIC VISIT: HCPCS

## 2021-08-20 ASSESSMENT — PAIN SCALES - GENERAL: PAINLEVEL: NO PAIN (0)

## 2021-08-20 NOTE — LETTER
2021       RE: Sherita Glasgow  [address]  [facility] [address]     Dear Colleague,    Thank you for referring your patient, Sherita Glasgow, to the [facility] [place] at [facility]. Please see a copy of my visit note below.    Subjective:     35 year old  at 37w5d presents for routine prenatal visit.            no vaginal bleeding or leakage of fluid.  rare contractions.  pos fetal movement.        No HA, visual changes, RUQ or epigastric pain.   Patient concerns: still struggling with low back pain-seeing chiro and PT.   Feeling well overall otherwise, sleeping ok  Finishing work today.   Objective:  Vitals:    21 1453   BP: 112/78   Pulse: 86   Weight: 69.4 kg (153 lb 1.6 oz)    See OB flowsheet  Assessment/Plan     Encounter Diagnoses   Name Primary?     Supervision of other normal pregnancy Yes     Adjustment disorder with mixed anxiety and depressed mood      Thrombocytopenia (H)      Orders Placed This Encounter   Procedures     CBC with Platelets     No orders of the defined types were placed in this encounter.    - Reviewed why/how to contact provider if headache/visual changes/RUQ or epigastric pain, decreased fetal movement, vaginal bleeding, leakage of fluid or strong/regular contractions.   Patient education/orders or handouts today:  Sign/symptoms of labor and When to call for labor or other concerns  Return to clinic in 1 week and prn if questions or concerns.   Candelaria Vang, BINH LOOMIS

## 2021-08-20 NOTE — PROGRESS NOTES
Subjective:     35 year old  at 37w5d presents for routine prenatal visit.            no vaginal bleeding or leakage of fluid.  rare contractions.  pos fetal movement.        No HA, visual changes, RUQ or epigastric pain.   Patient concerns: still struggling with low back pain-seeing chiro and PT.   Feeling well overall otherwise, sleeping ok  Finishing work today.   Objective:  Vitals:    21 1453   BP: 112/78   Pulse: 86   Weight: 69.4 kg (153 lb 1.6 oz)    See OB flowsheet  Assessment/Plan     Encounter Diagnoses   Name Primary?     Supervision of other normal pregnancy Yes     Adjustment disorder with mixed anxiety and depressed mood      Thrombocytopenia (H)      Orders Placed This Encounter   Procedures     CBC with Platelets     No orders of the defined types were placed in this encounter.    - Reviewed why/how to contact provider if headache/visual changes/RUQ or epigastric pain, decreased fetal movement, vaginal bleeding, leakage of fluid or strong/regular contractions.   Patient education/orders or handouts today:  Sign/symptoms of labor and When to call for labor or other concerns  Return to clinic in 1 week and prn if questions or concerns.   Candelaria Vang, APRN VLADISLAV

## 2021-08-25 ENCOUNTER — TELEPHONE (OUTPATIENT)
Dept: OBGYN | Facility: CLINIC | Age: 36
End: 2021-08-25

## 2021-08-31 NOTE — PROGRESS NOTES
Subjective:     34 yo  presents with John at 39/3 weeks  TCP, getting monthly CBC until 36 weeks, then weekly thereafter.   Last CBC 21: PLT: 142, CBC ordered today  Desires waterbirth  PP Support: will be off work until January  John will get 2 weeks off, then another week of half days. John's family is close and supportive.   PP Contraceptive plans: Has used condoms in the past, may consider an IUD. Has a history of menstrual migraines, may choose combination estrogen/progesterone to manage her migraines better.    her son for 11 months and got her first menses 6 months postpartum  Sherita has a history of menstrual migraines     Sherita has noted low back and L Sciatic nerve pain, mostly on the left side. She has seen PT and is employing stretches and a maternity belt, this has helped a lot. She thinks some of the prenatal yoga she was doing was hyperextending some of her joints.      Denies vaginal bleeding or leakage of fluid.  Occasional BH contractions.  Good fetal movement.        No HA, visual changes, RUQ or epigastric pain.   Sherita has noticed some brief sharp pains at night in her groin, lasting just seconds, likely nerve compression from the baby's head/low station  Presented at 7cm with her last labor, but did not delivery for 15 hours after admission.     Feeling well overall.    Objective:  Vitals:    21 0931   BP: 114/79   Pulse: 80   Weight: 70.3 kg (155 lb)    See OB flowsheet  Assessment/Plan     Encounter Diagnoses   Name Primary?     Supervision of other normal pregnancy Yes     Adjustment disorder with mixed anxiety and depressed mood      Thrombocytopenia (H)      Orders Placed This Encounter   Procedures     US OB Fetal Biophys Prf wo NonStrs Singls Sgl     CBC with Platelets     No orders of the defined types were placed in this encounter.    - Reviewed postdates testing including BPP => 41 weeks and rationale for induction of labor based on results.    Accepts 40 week BPP   - Reviewed why/how to contact provider if headache/visual changes/RUQ or epigastric pain, decreased fetal movement, vaginal bleeding, leakage of fluid or strong/regular contractions.   Patient education/orders or handouts today:  Sign/symptoms of labor, When to call for labor or other concerns, Postdates testing discussion and BPP  Return to clinic in 1 week and prn if questions or concerns.   Bela Jerez CNM

## 2021-09-01 ENCOUNTER — OFFICE VISIT (OUTPATIENT)
Dept: OBGYN | Facility: CLINIC | Age: 36
End: 2021-09-01
Attending: ADVANCED PRACTICE MIDWIFE
Payer: COMMERCIAL

## 2021-09-01 ENCOUNTER — LAB (OUTPATIENT)
Dept: LAB | Facility: CLINIC | Age: 36
End: 2021-09-01
Attending: ADVANCED PRACTICE MIDWIFE
Payer: COMMERCIAL

## 2021-09-01 VITALS
HEART RATE: 80 BPM | DIASTOLIC BLOOD PRESSURE: 79 MMHG | SYSTOLIC BLOOD PRESSURE: 114 MMHG | WEIGHT: 155 LBS | BODY MASS INDEX: 25.79 KG/M2

## 2021-09-01 DIAGNOSIS — Z34.80 SUPERVISION OF OTHER NORMAL PREGNANCY: Primary | ICD-10-CM

## 2021-09-01 DIAGNOSIS — F43.23 ADJUSTMENT DISORDER WITH MIXED ANXIETY AND DEPRESSED MOOD: ICD-10-CM

## 2021-09-01 DIAGNOSIS — D69.6 THROMBOCYTOPENIA (H): ICD-10-CM

## 2021-09-01 LAB
ERYTHROCYTE [DISTWIDTH] IN BLOOD BY AUTOMATED COUNT: 13.1 % (ref 10–15)
HCT VFR BLD AUTO: 37.4 % (ref 35–47)
HGB BLD-MCNC: 12.7 G/DL (ref 11.7–15.7)
MCH RBC QN AUTO: 30.7 PG (ref 26.5–33)
MCHC RBC AUTO-ENTMCNC: 34 G/DL (ref 31.5–36.5)
MCV RBC AUTO: 90 FL (ref 78–100)
PLATELET # BLD AUTO: 120 10E3/UL (ref 150–450)
RBC # BLD AUTO: 4.14 10E6/UL (ref 3.8–5.2)
WBC # BLD AUTO: 8.4 10E3/UL (ref 4–11)

## 2021-09-01 PROCEDURE — 36415 COLL VENOUS BLD VENIPUNCTURE: CPT

## 2021-09-01 PROCEDURE — G0463 HOSPITAL OUTPT CLINIC VISIT: HCPCS

## 2021-09-01 PROCEDURE — 99207 PR PRENATAL VISIT: CPT | Performed by: ADVANCED PRACTICE MIDWIFE

## 2021-09-01 PROCEDURE — 85014 HEMATOCRIT: CPT

## 2021-09-01 ASSESSMENT — PAIN SCALES - GENERAL: PAINLEVEL: NO PAIN (0)

## 2021-09-01 NOTE — LETTER
2021       RE: Sherita Glasgow  [address]  [facility] [address]     Dear Colleague,    Thank you for referring your patient, Sherita Glasgow, to the [facility] [place] at [facility]. Please see a copy of my visit note below.    Subjective:     34 yo  presents with John at 39/3 weeks  TCP, getting monthly CBC until 36 weeks, then weekly thereafter.   Last CBC 21: PLT: 142, CBC ordered today  Desires waterbirth  PP Support: will be off work until January  John will get 2 weeks off, then another week of half days. John's family is close and supportive.   PP Contraceptive plans: Has used condoms in the past, may consider an IUD. Has a history of menstrual migraines, may choose combination estrogen/progesterone to manage her migraines better.    her son for 11 months and got her first menses 6 months postpartum  Sherita has a history of menstrual migraines     Sherita has noted low back and L Sciatic nerve pain, mostly on the left side. She has seen PT and is employing stretches and a maternity belt, this has helped a lot. She thinks some of the prenatal yoga she was doing was hyperextending some of her joints.      Denies vaginal bleeding or leakage of fluid.  Occasional BH contractions.  Good fetal movement.        No HA, visual changes, RUQ or epigastric pain.   Sherita has noticed some brief sharp pains at night in her groin, lasting just seconds, likely nerve compression from the baby's head/low station  Presented at 7cm with her last labor, but did not delivery for 15 hours after admission.     Feeling well overall.    Objective:  Vitals:    21 0931   BP: 114/79   Pulse: 80   Weight: 70.3 kg (155 lb)    See OB flowsheet  Assessment/Plan     Encounter Diagnoses   Name Primary?     Supervision of other normal pregnancy Yes     Adjustment disorder with mixed anxiety and depressed mood       Thrombocytopenia (H)      Orders Placed This Encounter   Procedures     US OB Fetal Biophys Prf wo NonStrs Singls Sgl     CBC with Platelets     No orders of the defined types were placed in this encounter.    - Reviewed postdates testing including BPP => 41 weeks and rationale for induction of labor based on results.   Accepts 40 week BPP   - Reviewed why/how to contact provider if headache/visual changes/RUQ or epigastric pain, decreased fetal movement, vaginal bleeding, leakage of fluid or strong/regular contractions.   Patient education/orders or handouts today:  Sign/symptoms of labor, When to call for labor or other concerns, Postdates testing discussion and BPP  Return to clinic in 1 week and prn if questions or concerns.   Bela Jerez CNM

## 2021-09-01 NOTE — NURSING NOTE
Chief Complaint   Patient presents with     Prenatal Care     CECELIA 39 weeks and 3 days   Neida Culp LPN

## 2021-09-03 ENCOUNTER — TELEPHONE (OUTPATIENT)
Dept: OBGYN | Facility: CLINIC | Age: 36
End: 2021-09-03

## 2021-09-03 NOTE — TELEPHONE ENCOUNTER
Left message for patient to call back and change/reschedule appointment on 9/9 as original provider is not available. Can do the same date with a different provider at a different time (Dr. Salazar ~3:15 or 3:45) or 9/7. Please connect patient to  to schedule.    Chantal Mckee  Clinical Services Assistant

## 2021-09-07 ENCOUNTER — APPOINTMENT (OUTPATIENT)
Dept: LAB | Facility: CLINIC | Age: 36
End: 2021-09-07
Attending: MIDWIFE
Payer: COMMERCIAL

## 2021-09-07 ENCOUNTER — OFFICE VISIT (OUTPATIENT)
Dept: OBGYN | Facility: CLINIC | Age: 36
End: 2021-09-07
Attending: MIDWIFE
Payer: COMMERCIAL

## 2021-09-07 VITALS
DIASTOLIC BLOOD PRESSURE: 84 MMHG | BODY MASS INDEX: 26.06 KG/M2 | HEART RATE: 90 BPM | RESPIRATION RATE: 16 BRPM | SYSTOLIC BLOOD PRESSURE: 123 MMHG | WEIGHT: 156.4 LBS | HEIGHT: 65 IN

## 2021-09-07 DIAGNOSIS — F43.23 ADJUSTMENT DISORDER WITH MIXED ANXIETY AND DEPRESSED MOOD: ICD-10-CM

## 2021-09-07 DIAGNOSIS — Z34.80 SUPERVISION OF OTHER NORMAL PREGNANCY: Primary | ICD-10-CM

## 2021-09-07 DIAGNOSIS — D69.6 THROMBOCYTOPENIA (H): ICD-10-CM

## 2021-09-07 LAB
ERYTHROCYTE [DISTWIDTH] IN BLOOD BY AUTOMATED COUNT: 13 % (ref 10–15)
HCT VFR BLD AUTO: 38 % (ref 35–47)
HGB BLD-MCNC: 12.9 G/DL (ref 11.7–15.7)
MCH RBC QN AUTO: 30.6 PG (ref 26.5–33)
MCHC RBC AUTO-ENTMCNC: 33.9 G/DL (ref 31.5–36.5)
MCV RBC AUTO: 90 FL (ref 78–100)
PLATELET # BLD AUTO: 131 10E3/UL (ref 150–450)
RBC # BLD AUTO: 4.22 10E6/UL (ref 3.8–5.2)
WBC # BLD AUTO: 8.5 10E3/UL (ref 4–11)

## 2021-09-07 PROCEDURE — 36415 COLL VENOUS BLD VENIPUNCTURE: CPT | Performed by: MIDWIFE

## 2021-09-07 PROCEDURE — 99207 PR PRENATAL VISIT: CPT | Performed by: REGISTERED NURSE

## 2021-09-07 PROCEDURE — 85014 HEMATOCRIT: CPT | Performed by: MIDWIFE

## 2021-09-07 PROCEDURE — G0463 HOSPITAL OUTPT CLINIC VISIT: HCPCS | Mod: 25

## 2021-09-07 ASSESSMENT — MIFFLIN-ST. JEOR: SCORE: 1410.31

## 2021-09-07 NOTE — LETTER
"2021       RE: Sherita Glasgow  [address]  [facility] [address]     Dear Colleague,    Thank you for referring your patient, Sherita Glasgow, to the [facility] [place] at [facility]. Please see a copy of my visit note below.    Subjective:     36 year old  at 40w2d presents for routine prenatal visit.            no vaginal bleeding or leakage of fluid.  no contractions, has been having \"tightening sensation\" for the last few weeks intermittently.  + fetal movement.         No HA, visual changes, RUQ or epigastric pain.   Patient concerns: Feeling well overall.    HAPPY BDAY  - Routine BPP   -would not like cervical exam or membrane sweep today  -questions re: covid visitor policy, postdates testing vs. IOL, process if divert is necessary.   -repeat CBC today, agreeable    Objective:  Vitals:    21 1032   BP: 123/84   Pulse: 90   Resp: 16   Weight: 70.9 kg (156 lb 6.4 oz)   Height: 1.651 m (5' 5\")    See OB flowsheet    Assessment/Plan     Encounter Diagnoses   Name Primary?     Supervision of other normal pregnancy Yes     Adjustment disorder with mixed anxiety and depressed mood      Thrombocytopenia (H)      Orders Placed This Encounter   Procedures     US Fetal Biophys Prof w/o Non Stress Test     CBC with platelets     - Reviewed postdates testing including BPP => 41 weeks and rationale for induction of labor based on results.   Patient desires postdates testing. Scheduled for BPP 21 and PNC visit.   - Reviewed why/how to contact provider if headache/visual changes/RUQ or epigastric pain, decreased fetal movement, vaginal bleeding, leakage of fluid or strong/regular contractions.  - Reviewed covid policy for support persons in labor.   Patient education/orders or handouts today:  Sign/symptoms of labor, When to call for labor or other concerns, Postdates testing discussion and BPP as scheduled " 9/9/21    Return to clinic in 1 week and prn if questions or concerns.   BINH WeissM

## 2021-09-07 NOTE — PROGRESS NOTES
"Subjective:     36 year old  at 40w2d presents for routine prenatal visit.            no vaginal bleeding or leakage of fluid.  no contractions, has been having \"tightening sensation\" for the last few weeks intermittently.  + fetal movement.         No HA, visual changes, RUQ or epigastric pain.   Patient concerns: Feeling well overall.    HAPPY BDAY  - Routine BPP   -would not like cervical exam or membrane sweep today  -questions re: covid visitor policy, postdates testing vs. IOL, process if divert is necessary.   -repeat CBC today, agreeable    Objective:  Vitals:    21 1032   BP: 123/84   Pulse: 90   Resp: 16   Weight: 70.9 kg (156 lb 6.4 oz)   Height: 1.651 m (5' 5\")    See OB flowsheet    Assessment/Plan     Encounter Diagnoses   Name Primary?     Supervision of other normal pregnancy Yes     Adjustment disorder with mixed anxiety and depressed mood      Thrombocytopenia (H)      Orders Placed This Encounter   Procedures     US Fetal Biophys Prof w/o Non Stress Test     CBC with platelets     - Reviewed postdates testing including BPP => 41 weeks and rationale for induction of labor based on results.   Patient desires postdates testing. Scheduled for BPP 21 and PNC visit.   - Reviewed why/how to contact provider if headache/visual changes/RUQ or epigastric pain, decreased fetal movement, vaginal bleeding, leakage of fluid or strong/regular contractions.  - Reviewed covid policy for support persons in labor.   Patient education/orders or handouts today:  Sign/symptoms of labor, When to call for labor or other concerns, Postdates testing discussion and BPP as scheduled 21    Return to clinic in 1 week and prn if questions or concerns.   BINH Weiss CNM      "

## 2021-09-09 ENCOUNTER — ANCILLARY PROCEDURE (OUTPATIENT)
Dept: ULTRASOUND IMAGING | Facility: CLINIC | Age: 36
End: 2021-09-09
Attending: ADVANCED PRACTICE MIDWIFE
Payer: COMMERCIAL

## 2021-09-09 DIAGNOSIS — Z34.80 SUPERVISION OF OTHER NORMAL PREGNANCY: ICD-10-CM

## 2021-09-09 PROCEDURE — 76819 FETAL BIOPHYS PROFIL W/O NST: CPT | Mod: 26 | Performed by: OBSTETRICS & GYNECOLOGY

## 2021-09-09 PROCEDURE — 76819 FETAL BIOPHYS PROFIL W/O NST: CPT

## 2021-09-13 ENCOUNTER — TELEPHONE (OUTPATIENT)
Dept: OBGYN | Facility: CLINIC | Age: 36
End: 2021-09-13

## 2021-09-13 ENCOUNTER — HOSPITAL ENCOUNTER (INPATIENT)
Facility: CLINIC | Age: 36
LOS: 2 days | Discharge: HOME | End: 2021-09-15
Attending: ADVANCED PRACTICE MIDWIFE | Admitting: ADVANCED PRACTICE MIDWIFE
Payer: COMMERCIAL

## 2021-09-13 LAB
ABO/RH(D): NORMAL
ANTIBODY SCREEN: NEGATIVE
BASOPHILS # BLD AUTO: 0 10E3/UL (ref 0–0.2)
BASOPHILS NFR BLD AUTO: 0 %
EOSINOPHIL # BLD AUTO: 0 10E3/UL (ref 0–0.7)
EOSINOPHIL NFR BLD AUTO: 0 %
ERYTHROCYTE [DISTWIDTH] IN BLOOD BY AUTOMATED COUNT: 12.6 % (ref 10–15)
HCT VFR BLD AUTO: 39.9 % (ref 35–47)
HGB BLD-MCNC: 13.8 G/DL (ref 11.7–15.7)
IMM GRANULOCYTES # BLD: 0.1 10E3/UL
IMM GRANULOCYTES NFR BLD: 1 %
LYMPHOCYTES # BLD AUTO: 1 10E3/UL (ref 0.8–5.3)
LYMPHOCYTES NFR BLD AUTO: 9 %
MCH RBC QN AUTO: 30.8 PG (ref 26.5–33)
MCHC RBC AUTO-ENTMCNC: 34.6 G/DL (ref 31.5–36.5)
MCV RBC AUTO: 89 FL (ref 78–100)
MONOCYTES # BLD AUTO: 0.6 10E3/UL (ref 0–1.3)
MONOCYTES NFR BLD AUTO: 5 %
NEUTROPHILS # BLD AUTO: 9.9 10E3/UL (ref 1.6–8.3)
NEUTROPHILS NFR BLD AUTO: 85 %
NRBC # BLD AUTO: 0 10E3/UL
NRBC BLD AUTO-RTO: 0 /100
PLATELET # BLD AUTO: 134 10E3/UL (ref 150–450)
RBC # BLD AUTO: 4.48 10E6/UL (ref 3.8–5.2)
SARS-COV-2 RNA RESP QL NAA+PROBE: NEGATIVE
SPECIMEN EXPIRATION DATE: NORMAL
T PALLIDUM AB SER QL: NONREACTIVE
WBC # BLD AUTO: 11.6 10E3/UL (ref 4–11)

## 2021-09-13 PROCEDURE — 999N000016 HC STATISTIC ATTENDANCE AT DELIVERY

## 2021-09-13 PROCEDURE — 0KQM0ZZ REPAIR PERINEUM MUSCLE, OPEN APPROACH: ICD-10-PCS | Performed by: ADVANCED PRACTICE MIDWIFE

## 2021-09-13 PROCEDURE — 36415 COLL VENOUS BLD VENIPUNCTURE: CPT | Performed by: ADVANCED PRACTICE MIDWIFE

## 2021-09-13 PROCEDURE — 722N000001 HC LABOR CARE VAGINAL DELIVERY SINGLE

## 2021-09-13 PROCEDURE — 0UQMXZZ REPAIR VULVA, EXTERNAL APPROACH: ICD-10-PCS | Performed by: ADVANCED PRACTICE MIDWIFE

## 2021-09-13 PROCEDURE — 250N000011 HC RX IP 250 OP 636: Performed by: ADVANCED PRACTICE MIDWIFE

## 2021-09-13 PROCEDURE — 250N000013 HC RX MED GY IP 250 OP 250 PS 637: Performed by: ADVANCED PRACTICE MIDWIFE

## 2021-09-13 PROCEDURE — 59400 OBSTETRICAL CARE: CPT | Performed by: ADVANCED PRACTICE MIDWIFE

## 2021-09-13 PROCEDURE — 250N000009 HC RX 250: Performed by: ADVANCED PRACTICE MIDWIFE

## 2021-09-13 PROCEDURE — U0005 INFEC AGEN DETEC AMPLI PROBE: HCPCS | Performed by: ADVANCED PRACTICE MIDWIFE

## 2021-09-13 PROCEDURE — 85025 COMPLETE CBC W/AUTO DIFF WBC: CPT | Performed by: ADVANCED PRACTICE MIDWIFE

## 2021-09-13 PROCEDURE — 86901 BLOOD TYPING SEROLOGIC RH(D): CPT | Performed by: ADVANCED PRACTICE MIDWIFE

## 2021-09-13 PROCEDURE — 120N000002 HC R&B MED SURG/OB UMMC

## 2021-09-13 PROCEDURE — 86780 TREPONEMA PALLIDUM: CPT | Performed by: ADVANCED PRACTICE MIDWIFE

## 2021-09-13 RX ORDER — MISOPROSTOL 200 UG/1
TABLET ORAL
Status: DISCONTINUED
Start: 2021-09-13 | End: 2021-09-13 | Stop reason: HOSPADM

## 2021-09-13 RX ORDER — KETOROLAC TROMETHAMINE 30 MG/ML
30 INJECTION, SOLUTION INTRAMUSCULAR; INTRAVENOUS
Status: DISCONTINUED | OUTPATIENT
Start: 2021-09-13 | End: 2021-09-13

## 2021-09-13 RX ORDER — CARBOPROST TROMETHAMINE 250 UG/ML
250 INJECTION, SOLUTION INTRAMUSCULAR
Status: DISCONTINUED | OUTPATIENT
Start: 2021-09-13 | End: 2021-09-13

## 2021-09-13 RX ORDER — PROCHLORPERAZINE MALEATE 10 MG
10 TABLET ORAL EVERY 6 HOURS PRN
Status: DISCONTINUED | OUTPATIENT
Start: 2021-09-13 | End: 2021-09-13

## 2021-09-13 RX ORDER — HYDROCORTISONE 2.5 %
CREAM (GRAM) TOPICAL 3 TIMES DAILY PRN
Status: DISCONTINUED | OUTPATIENT
Start: 2021-09-13 | End: 2021-09-15 | Stop reason: HOSPADM

## 2021-09-13 RX ORDER — METOCLOPRAMIDE 10 MG/1
10 TABLET ORAL EVERY 6 HOURS PRN
Status: DISCONTINUED | OUTPATIENT
Start: 2021-09-13 | End: 2021-09-13

## 2021-09-13 RX ORDER — ONDANSETRON 2 MG/ML
4 INJECTION INTRAMUSCULAR; INTRAVENOUS EVERY 6 HOURS PRN
Status: DISCONTINUED | OUTPATIENT
Start: 2021-09-13 | End: 2021-09-13

## 2021-09-13 RX ORDER — NALOXONE HYDROCHLORIDE 0.4 MG/ML
0.4 INJECTION, SOLUTION INTRAMUSCULAR; INTRAVENOUS; SUBCUTANEOUS
Status: DISCONTINUED | OUTPATIENT
Start: 2021-09-13 | End: 2021-09-13

## 2021-09-13 RX ORDER — TRANEXAMIC ACID 10 MG/ML
1 INJECTION, SOLUTION INTRAVENOUS EVERY 30 MIN PRN
Status: DISCONTINUED | OUTPATIENT
Start: 2021-09-13 | End: 2021-09-15 | Stop reason: HOSPADM

## 2021-09-13 RX ORDER — OXYTOCIN/0.9 % SODIUM CHLORIDE 30/500 ML
340 PLASTIC BAG, INJECTION (ML) INTRAVENOUS CONTINUOUS PRN
Status: DISCONTINUED | OUTPATIENT
Start: 2021-09-13 | End: 2021-09-15 | Stop reason: HOSPADM

## 2021-09-13 RX ORDER — OXYTOCIN 10 [USP'U]/ML
INJECTION, SOLUTION INTRAMUSCULAR; INTRAVENOUS
Status: DISCONTINUED
Start: 2021-09-13 | End: 2021-09-13 | Stop reason: HOSPADM

## 2021-09-13 RX ORDER — METOCLOPRAMIDE HYDROCHLORIDE 5 MG/ML
10 INJECTION INTRAMUSCULAR; INTRAVENOUS EVERY 6 HOURS PRN
Status: DISCONTINUED | OUTPATIENT
Start: 2021-09-13 | End: 2021-09-13

## 2021-09-13 RX ORDER — BISACODYL 10 MG
10 SUPPOSITORY, RECTAL RECTAL DAILY PRN
Status: DISCONTINUED | OUTPATIENT
Start: 2021-09-13 | End: 2021-09-15 | Stop reason: HOSPADM

## 2021-09-13 RX ORDER — LIDOCAINE HYDROCHLORIDE 10 MG/ML
INJECTION, SOLUTION EPIDURAL; INFILTRATION; INTRACAUDAL; PERINEURAL
Status: DISCONTINUED
Start: 2021-09-13 | End: 2021-09-13 | Stop reason: HOSPADM

## 2021-09-13 RX ORDER — OXYTOCIN 10 [USP'U]/ML
10 INJECTION, SOLUTION INTRAMUSCULAR; INTRAVENOUS
Status: DISCONTINUED | OUTPATIENT
Start: 2021-09-13 | End: 2021-09-13

## 2021-09-13 RX ORDER — SODIUM CHLORIDE, SODIUM LACTATE, POTASSIUM CHLORIDE, CALCIUM CHLORIDE 600; 310; 30; 20 MG/100ML; MG/100ML; MG/100ML; MG/100ML
INJECTION, SOLUTION INTRAVENOUS CONTINUOUS
Status: DISCONTINUED | OUTPATIENT
Start: 2021-09-13 | End: 2021-09-13

## 2021-09-13 RX ORDER — TRANEXAMIC ACID 10 MG/ML
1 INJECTION, SOLUTION INTRAVENOUS EVERY 30 MIN PRN
Status: DISCONTINUED | OUTPATIENT
Start: 2021-09-13 | End: 2021-09-13

## 2021-09-13 RX ORDER — MODIFIED LANOLIN
OINTMENT (GRAM) TOPICAL
Status: DISCONTINUED | OUTPATIENT
Start: 2021-09-13 | End: 2021-09-15 | Stop reason: HOSPADM

## 2021-09-13 RX ORDER — MISOPROSTOL 200 UG/1
400 TABLET ORAL
Status: DISCONTINUED | OUTPATIENT
Start: 2021-09-13 | End: 2021-09-13

## 2021-09-13 RX ORDER — OXYTOCIN 10 [USP'U]/ML
10 INJECTION, SOLUTION INTRAMUSCULAR; INTRAVENOUS
Status: COMPLETED | OUTPATIENT
Start: 2021-09-13 | End: 2021-09-13

## 2021-09-13 RX ORDER — ACETAMINOPHEN 325 MG/1
650 TABLET ORAL EVERY 4 HOURS PRN
Status: DISCONTINUED | OUTPATIENT
Start: 2021-09-13 | End: 2021-09-15 | Stop reason: HOSPADM

## 2021-09-13 RX ORDER — OXYTOCIN/0.9 % SODIUM CHLORIDE 30/500 ML
100-340 PLASTIC BAG, INJECTION (ML) INTRAVENOUS CONTINUOUS PRN
Status: DISCONTINUED | OUTPATIENT
Start: 2021-09-13 | End: 2021-09-13

## 2021-09-13 RX ORDER — METHYLERGONOVINE MALEATE 0.2 MG/ML
200 INJECTION INTRAVENOUS
Status: DISCONTINUED | OUTPATIENT
Start: 2021-09-13 | End: 2021-09-13

## 2021-09-13 RX ORDER — MISOPROSTOL 200 UG/1
400 TABLET ORAL
Status: DISCONTINUED | OUTPATIENT
Start: 2021-09-13 | End: 2021-09-15 | Stop reason: HOSPADM

## 2021-09-13 RX ORDER — NALOXONE HYDROCHLORIDE 0.4 MG/ML
0.2 INJECTION, SOLUTION INTRAMUSCULAR; INTRAVENOUS; SUBCUTANEOUS
Status: DISCONTINUED | OUTPATIENT
Start: 2021-09-13 | End: 2021-09-13

## 2021-09-13 RX ORDER — METHYLERGONOVINE MALEATE 0.2 MG/ML
200 INJECTION INTRAVENOUS
Status: DISCONTINUED | OUTPATIENT
Start: 2021-09-13 | End: 2021-09-15 | Stop reason: HOSPADM

## 2021-09-13 RX ORDER — IBUPROFEN 600 MG/1
600 TABLET, FILM COATED ORAL
Status: DISCONTINUED | OUTPATIENT
Start: 2021-09-13 | End: 2021-09-13

## 2021-09-13 RX ORDER — OXYTOCIN/0.9 % SODIUM CHLORIDE 30/500 ML
PLASTIC BAG, INJECTION (ML) INTRAVENOUS
Status: DISCONTINUED
Start: 2021-09-13 | End: 2021-09-13 | Stop reason: WASHOUT

## 2021-09-13 RX ORDER — LIDOCAINE 40 MG/G
CREAM TOPICAL
Status: DISCONTINUED | OUTPATIENT
Start: 2021-09-13 | End: 2021-09-13

## 2021-09-13 RX ORDER — DOCUSATE SODIUM 100 MG/1
100 CAPSULE, LIQUID FILLED ORAL DAILY
Status: DISCONTINUED | OUTPATIENT
Start: 2021-09-13 | End: 2021-09-15 | Stop reason: HOSPADM

## 2021-09-13 RX ORDER — ONDANSETRON 4 MG/1
4 TABLET, ORALLY DISINTEGRATING ORAL EVERY 6 HOURS PRN
Status: DISCONTINUED | OUTPATIENT
Start: 2021-09-13 | End: 2021-09-13

## 2021-09-13 RX ORDER — PROCHLORPERAZINE 25 MG
25 SUPPOSITORY, RECTAL RECTAL EVERY 12 HOURS PRN
Status: DISCONTINUED | OUTPATIENT
Start: 2021-09-13 | End: 2021-09-13

## 2021-09-13 RX ORDER — MISOPROSTOL 200 UG/1
800 TABLET ORAL
Status: DISCONTINUED | OUTPATIENT
Start: 2021-09-13 | End: 2021-09-15 | Stop reason: HOSPADM

## 2021-09-13 RX ORDER — OXYTOCIN/0.9 % SODIUM CHLORIDE 30/500 ML
340 PLASTIC BAG, INJECTION (ML) INTRAVENOUS CONTINUOUS PRN
Status: DISCONTINUED | OUTPATIENT
Start: 2021-09-13 | End: 2021-09-13

## 2021-09-13 RX ORDER — IBUPROFEN 800 MG/1
800 TABLET, FILM COATED ORAL EVERY 6 HOURS PRN
Status: DISCONTINUED | OUTPATIENT
Start: 2021-09-13 | End: 2021-09-15 | Stop reason: HOSPADM

## 2021-09-13 RX ORDER — MISOPROSTOL 200 UG/1
800 TABLET ORAL
Status: DISCONTINUED | OUTPATIENT
Start: 2021-09-13 | End: 2021-09-13

## 2021-09-13 RX ORDER — CARBOPROST TROMETHAMINE 250 UG/ML
250 INJECTION, SOLUTION INTRAMUSCULAR
Status: DISCONTINUED | OUTPATIENT
Start: 2021-09-13 | End: 2021-09-15 | Stop reason: HOSPADM

## 2021-09-13 RX ORDER — OXYTOCIN 10 [USP'U]/ML
10 INJECTION, SOLUTION INTRAMUSCULAR; INTRAVENOUS
Status: DISCONTINUED | OUTPATIENT
Start: 2021-09-13 | End: 2021-09-15 | Stop reason: HOSPADM

## 2021-09-13 RX ADMIN — ACETAMINOPHEN 650 MG: 325 TABLET, FILM COATED ORAL at 23:28

## 2021-09-13 RX ADMIN — LIDOCAINE HYDROCHLORIDE 10 ML: 10 INJECTION, SOLUTION EPIDURAL; INFILTRATION; INTRACAUDAL; PERINEURAL at 18:47

## 2021-09-13 RX ADMIN — OXYTOCIN 10 UNITS: 10 INJECTION, SOLUTION INTRAMUSCULAR; INTRAVENOUS at 18:43

## 2021-09-13 RX ADMIN — ACETAMINOPHEN 650 MG: 325 TABLET, FILM COATED ORAL at 19:14

## 2021-09-13 RX ADMIN — MISOPROSTOL 400 MCG: 200 TABLET ORAL at 18:45

## 2021-09-13 RX ADMIN — IBUPROFEN 800 MG: 800 TABLET, FILM COATED ORAL at 21:32

## 2021-09-13 RX ADMIN — DOCUSATE SODIUM 100 MG: 100 CAPSULE, LIQUID FILLED ORAL at 21:34

## 2021-09-13 NOTE — PLAN OF CARE
VSS. Pt robert regularly Q2-4 minutes. Palpate strong. EFM difficult to monitor due to frequent positional changes. Pt breathing well through contractions,  at bedside. Planning on water birth pending covid results. Labs collected and pending. Anticipate .

## 2021-09-13 NOTE — PLAN OF CARE
VSS. Pt robert Q2-4 minutes, palpate strong. COVID results negative. Birthing tub filled and pt would like to labor in tub. FHR in 120-130's via doppler. Intact. Pt changing positions frequently.  and spouse John at bedside. Pt continues to decline IV at this time. Anticipate .

## 2021-09-13 NOTE — H&P
ADMIT NOTE  =================  41w1d    Sherita Glasgow is a 36 year old female with an Patient's last menstrual period was 2020. and Estimated Date of Delivery: Sep 5, 2021 [facility] at 12:12 PM in active labor.      HPI  ================  [facility] with contractions increasing in frequency and intensity. She first noticed contractions starting at 0100 this morning occurring every 20-30 minutes, around 0900 contractions became closer together and more intense, she continued to labor at home with her  John and Rocael Delatorre. At home she did have two large emesis and one loose stool. In the room she is robert every 2-3 minutes, breathing well through contractions and coping well with the support of John and Juan Ramon. She is declining an IV at this time, but is open to having one if needed later. She is taking in oral fluids and voiding without issue. She is desiring a water birth at this time.   Denies fever, cough, SOB or chest pain. Denies having contact with anyone who is Covid-19 positive. Agreeable to Covid-19 testing. Understand that she needs a negative Covid-19 test to have a water birth.   Contractions- every 2-3 minutes  Fetal movement- active  ROM- no.  Vaginal bleeding- none  GBS- negative  FOB- is involved, John   Other labor support- Rocael Delatorre     Weight gain- 156 - 131 lbs, Total weight gain- 25 lbs  Height- 65in  BMI- 21.8  First prenatal visit at 10+1 weeks, Total visits- 10  Other: Gestational Thrombocytopenia Platelets on  were 131              Anxiety and Depression- not on medication.   PROBLEM LIST  =================  Patient Active Problem List    Diagnosis Date Noted     Labor and delivery indication for care or intervention 2021     Priority: Medium     Supervision of other normal pregnancy 2021     Priority: Medium     [facility] CNM pt  Partner's name: John  [x ] Entered on Epic list  [x ] NOB folder  [x ]  Dating  [ ] First tri screen ordered; UNDECIDED, address at NOB  [ ] QS/AFP ordered declined  X] Fetal anatomy US ordered  [X] Rubella immune  [x ] Hep B immune  [x ] Pap - Last pap 2/23/18, NILM  [NA ] Started ASA   [NA ] NO plan utox in labor   _____________________________________  [x ] EOB folder  [X] PP Contraception plan: PP Contraceptive plans: Has used condoms in the past, may consider an IUD. Has a history of menstrual migraines, may choose combination estrogen/progesterone to manage her migraines better.  [x ] Labor plans: unmedicated  [x ] : Juan Ramon Rivero  [x ] Infant feeding plan breast  [x ] FLU shot - received 2/8/21  [x ] TDAP given  [NA ] Rhogam if needed, date:  [NA] TOLAC consent done  [x ] Waterbirth consent done  [ x] GCT, passed  ________________________________________  [X] GBS NEGATIVE  [ ] OTC PP meds sent           Thrombocytopenia (H) 09/25/2018     Priority: Medium     2021: Recheck q 4wks, then weekly at 36wks    7/23/2021: plts 134  8/13/21: plts 134  9/1/21:        Migraine without aura and without status migrainosus, not intractable 01/29/2018     Priority: Medium     2/8/21 - migraines stable   8/13/18  Has discussed with dr. Rayo, who says her meds are safe in pregnancy. Pt unsure still...  Given contact info for Dr. Godoy       Adjustment disorder with mixed anxiety and depressed mood 01/29/2018     Priority: Medium     Celexa Rx given, seeing therapist  7/2: has not started med yet-considering  8/20: decided against initiation during preg, will ask if she wants during pp period.         HISTORIES  ============  No Known Allergies  Past Medical History:   Diagnosis Date     Migraines     without aura     Past Surgical History:   Procedure Laterality Date     ANKLE SURGERY  2008     COLONOSCOPY  2009    normal result; done due to digestive issues     HERNIA REPAIR  1994     ORTHOPEDIC SURGERY  2008    Right ankle   .  Family History   Problem Relation Age of Onset      Lung Cancer Paternal Grandmother         nonsmoker     Breast Cancer Paternal Grandmother         over age 50     Alzheimer Disease Paternal Grandfather      Colon Cancer Paternal Aunt      Coronary Artery Disease Early Onset No family hx of      Hypertension No family hx of      Hyperlipidemia No family hx of      Diabetes No family hx of      Melanoma No family hx of      Skin Cancer No family hx of      Glaucoma No family hx of      Macular Degeneration No family hx of      Social History     Tobacco Use     Smoking status: Never Smoker     Smokeless tobacco: Never Used   Substance Use Topics     Alcohol use: Not Currently     Alcohol/week: 2.0 standard drinks     Comment: 3-5 drinks/week     OB History    Para Term  AB Living   2 1 1 0 0 1   SAB TAB Ectopic Multiple Live Births   0 0 0 0 1      # Outcome Date GA Lbr Allen/2nd Weight Sex Delivery Anes PTL Lv   2 Current            1 Term 18 41w0d 10:39 / 01:36 4.06 kg (8 lb 15.2 oz) M Vag-Spont None N HANH      Complications: Dysfunctional Labor      Name: Siddharth      Apgar1: 8  Apgar5: 9        LABS:   ===========  Prenatal Labs:  Rhogam not indicated   Lab Results   Component Value Date    ABO A 2021    RH Pos 2021    AS Neg 2021    RUQIGG 42 2021    HEPBANG Nonreactive 2021    HGB 12.9 2021    HIAGAB Nonreactive 2021    GLU1 106 06/15/2021     Rubella Immune   Lab Results   Component Value Date    GBS Negative 11/15/2018     Other labs:  COVID-19 PCR Results    COVID-19 PCR Results   No data to display.         COVID-19 Antibody Results, Testing for Immunity    COVID-19 Antibody Results, Testing for Immunity   No data to display.            No results found for this or any previous visit (from the past 24 hour(s)).    ROS  =========  Pt denies significant respiratory, cardiovacular, GI, or muscular/skeletalcomplaints.    See RN data base ROS.       PHYSICAL EXAM:  ===============  /82   Resp  16   LMP 11/29/2020   General appearance: uncomfortable with contractions  GENERAL APPEARANCE: healthy, alert and no distress  RESP: respirations unlabored   CV:no varicosities, edema, and well perfused.   ABDOMEN:  soft, nontender, no epigastric pain  SKIN: no suspicious lesions or rashes  NEURO: Denies headache, blurred vision, other vision changes  PSYCH: mentation appears normal. and affect normal/bright  Legs: Reflexes normal bilaterally and No edema     Abdomen: gravid, vertex fetus per Leopold's, non-tender between contractions.   Cephalic presentation confirmed by BSUS and SVE  EFW-  8.5 lbs.   CONTRACTIONS: every 2-3 minutes  FETAL HEART TONES: continuous EFM- baseline 130 with moderate variability and positive accelerations. No decelerations. Broken tracing due to maternal movement, RN applying different monitor to see if we can get an unbroken tracing.   PELVIC EXAM: 5.5/ 80%/ Mid/ soft/ 0   EBNSON SCORE: 11  BLOODY SHOW: no   ROM:no  FLUID: clear and none  ROMPLUS: not done    # Pain Assessment:  Current Pain Score 9/13/2021   Patient currently in pain? yes   - Sherita is experiencing pain due to labor. Pain management was discussed with Sherita and her spouse and the plan was created in a collaborative fashion.  Sherita's response to the current recommendations: engaged  - Sherita desires an un medicated water birth at this time. Would like you use the tube/shower to help with pain.         ASSESSMENT:  ==============  IUP @ 41w1d admitted in active labor   NST REACTIVE  Fetal Heart Tones - appears category one RN repositioning monitor  GBS- negative  Covid- pending    Patient Active Problem List   Diagnosis     Migraine without aura and without status migrainosus, not intractable     Adjustment disorder with mixed anxiety and depressed mood     Thrombocytopenia (H)     Supervision of other normal pregnancy     Labor and delivery indication for care or intervention  "      PLAN:  ===========  Admit - see IP orders  Declines IV at this time  Desires Water Birth and IA  Discussed Category one fetal tracing for IA and water birth  Continue to facilitate active labor and support patient in changing position, staying hydrated, and emptying bladder frequently.    Reevaluate PRN per maternal and fetal indications.   Anticipate     I, Nilo DESOUZA, am serving as a scribe to document services personally performed by CNM based on the provider's statements to me.\" -  LYLY Gonzalez      The encounter was performed by me and scribed by the SNM. The scribed note accurately reflects my personal services and decisions made by me.     Gordon Anton, BINH, VLADISLAV     "

## 2021-09-13 NOTE — PLAN OF CARE
Vaginal Delivery Note   of viable Male with Dina Liz CNM in attendance.  NICU called for decels. Nursery RN Angelica present.  Infant with spontaneous cry, to mother's abdomen, dried and stimulated. Placenta delivered with out complication, none, second laceration, with repair, shane cares provided. IM pit and Buccal Miso given. Mother and baby in stable condition.

## 2021-09-13 NOTE — PROVIDER NOTIFICATION
21 1142   Provider Notification   Provider Name/Title Gordon GILMORE CNM   Method of Notification Electronic Page   Request Evaluate in Person   Notification Reason Patient Arrived   FYI pt arrival. Hoping for water birth, 41.1 . Ctx Q5 min, palpate strong

## 2021-09-13 NOTE — PROGRESS NOTES
Blood pressure 110/64, temperature 98.4  F (36.9  C), temperature source Oral, resp. rate 18, last menstrual period 11/29/2020, not currently breastfeeding.  Patient Vitals for the past 24 hrs:   BP Temp Temp src Resp   09/13/21 1505 110/64 98.4  F (36.9  C) Oral 18   09/13/21 1138 111/82 98  F (36.7  C) Oral 16     General appearance: Notified by RN of SROM @ 1625, clear fluid. Pt in water birth tub. Leaning over edge of tub. Starting to feel pressure with contractions.   CONTRACTIONS: every 2-4 minutes  Pitocin- none,  Antibiotics- none  FETAL HEART TONES: IA: 130, no decreases heard  ROM: SROM clear fluid @1625  PELVIC EXAM:deferred    # Pain Assessment:  Current Pain Score 9/13/2021   Patient currently in pain? yes   - Sherita is experiencing pain due to contractions, pressure. Pain management was discussed and the plan was created in a collaborative fashion.  Sherita's response to the current recommendations: engaged  - water birth tub, support from  and , breathing    ASSESSMENT:  ==============  IUP @ 41w1d for active labor  , no decreases heard  GBS- negative    SROM @ 1625, clear    Patient Active Problem List   Diagnosis     Migraine without aura and without status migrainosus, not intractable     Adjustment disorder with mixed anxiety and depressed mood     Thrombocytopenia (H)     Supervision of other normal pregnancy     Labor and delivery indication for care or intervention     PLAN:  ===========  Continue labor support.   Reevaluate prn per maternal/fetal indications.    BINH Dumont CNM     Addendum @ 1725:    Pt feeling some urge to push with contractions. Does not feel urge during contractions.   SVE @ 1711: 9/90/+1    BINH Dumont CNM

## 2021-09-13 NOTE — PROVIDER NOTIFICATION
09/13/21 1323   Provider Notification   Provider Name/Title Gordon GILMORE CNM   Method of Notification Electronic Page   Request Evaluate - Remote   Notification Reason Status Update   Pt in shower and requesting to come off of EFM. Strip reviewed and pt ok to come off Tressa monitor. Continue to monitor Q30 via Doppler. RN to reapply EFM with any decreases in FHR.

## 2021-09-13 NOTE — PROGRESS NOTES
Blood pressure 110/64, temperature 98.4  F (36.9  C), temperature source Oral, resp. rate 18, last menstrual period 11/29/2020, not currently breastfeeding.  Patient Vitals for the past 24 hrs:   BP Temp Temp src Resp   09/13/21 1505 110/64 98.4  F (36.9  C) Oral 18   09/13/21 1138 111/82 98  F (36.7  C) Oral 16     General appearance: pt feeling urge to push with contractions, in water birth tub  CONTRACTIONS: every 2-3 minutes  Pitocin- none,  Antibiotics- none  FETAL HEART TONES: IA 130s, decrease heard during last several contractions to 110 bpm. Out of tub to transition to EFM  ROM: clear fluid  PELVIC EXAM: 9/100/+1, reduced with practice push but felt again after contraction    # Pain Assessment:  Current Pain Score 9/13/2021   Patient currently in pain? yes   - Sherita is experiencing pain due to contractions. Pain management was discussed and the plan was created in a collaborative fashion.  Sherita's response to the current recommendations: engaged  - unmedicated    ASSESSMENT:  ==============  IUP @ 41w1d in active labor   Fetal Heart Rate Tracing decreases heard with intermittent auscultation, EFM applied.  GBS- negative  Patient Active Problem List   Diagnosis     Migraine without aura and without status migrainosus, not intractable     Adjustment disorder with mixed anxiety and depressed mood     Thrombocytopenia (H)     Supervision of other normal pregnancy     Labor and delivery indication for care or intervention     PLAN:  ===========  Out of tub d/t decreases heard with last 3 contractions.  RN to apply EFM and toco now.     Gordon Anton, APRN, CNM

## 2021-09-13 NOTE — PROGRESS NOTES
Blood pressure 110/64, temperature 98.4  F (36.9  C), temperature source Oral, resp. rate 18, last menstrual period 11/29/2020, not currently breastfeeding.  Patient Vitals for the past 24 hrs:   BP Temp Temp src Resp   09/13/21 1505 110/64 98.4  F (36.9  C) Oral 18   09/13/21 1138 111/82 98  F (36.7  C) Oral 16     General appearance: Currently in side lying position, great support from  and . Pt coping well, breathing through contractions. Would like exam. Water birth tub being filled.  CONTRACTIONS: every 2-4 minutes  Pitocin- none,  Antibiotics- none  FETAL HEART TONES: IA: FHTs 130, no decreases heard  ROM: not ruptured  PELVIC EXAM: 7/90/0, intact    # Pain Assessment:  Current Pain Score 9/13/2021   Patient currently in pain? yes   - Sherita is experiencing pain due to contractions. Pain management was discussed and the plan was created in a collaborative fashion.  Sherita's response to the current recommendations: engaged  - position changes, ambulation, massage, water birth tub    ASSESSMENT:  ==============  IUP @ 41w1d in active labor   Intermittent auscultation- FHR 130s, no decreases heardd  GBS- negative    Cervical change- 7/90/0    Patient Active Problem List   Diagnosis     Migraine without aura and without status migrainosus, not intractable     Adjustment disorder with mixed anxiety and depressed mood     Thrombocytopenia (H)     Supervision of other normal pregnancy     Labor and delivery indication for care or intervention     PLAN:  ===========  Water birth tub is being filled.  Reviewed if decreases heard with IA, will need get out of tub and place EFM and toco.   If cat 1, may return to birthing tub.  Also reviewed other contraindications to waterbirth.  Plan expectant management at this time. AROM if pt desires.   Reevaluate prn per maternal/fetal indications.    BINH Dumont, CNM

## 2021-09-13 NOTE — TELEPHONE ENCOUNTER
Sherita is 41 1/7 weeks gestation.  Started robert around 1 AM every 10-15 minutes.  For the past hour, contractions have been every 3-4 minutes, lasting only 30-45 seconds, but increasing in intensity.  No leaking of fluid, no bleeding.  Positive fetal movement.    Discussed with Gordon, and advised Sherita that [facility] to [facility] when she feels ready to do so.  She plans to go soon-lives around 20 minutes away.

## 2021-09-14 LAB — HGB BLD-MCNC: 11.4 G/DL (ref 11.7–15.7)

## 2021-09-14 PROCEDURE — 85018 HEMOGLOBIN: CPT | Performed by: ADVANCED PRACTICE MIDWIFE

## 2021-09-14 PROCEDURE — 36415 COLL VENOUS BLD VENIPUNCTURE: CPT | Performed by: ADVANCED PRACTICE MIDWIFE

## 2021-09-14 PROCEDURE — 120N000002 HC R&B MED SURG/OB UMMC

## 2021-09-14 PROCEDURE — 250N000013 HC RX MED GY IP 250 OP 250 PS 637: Performed by: ADVANCED PRACTICE MIDWIFE

## 2021-09-14 RX ADMIN — ACETAMINOPHEN 650 MG: 325 TABLET, FILM COATED ORAL at 09:31

## 2021-09-14 RX ADMIN — IBUPROFEN 800 MG: 800 TABLET, FILM COATED ORAL at 18:43

## 2021-09-14 RX ADMIN — DOCUSATE SODIUM 100 MG: 100 CAPSULE, LIQUID FILLED ORAL at 09:31

## 2021-09-14 RX ADMIN — IBUPROFEN 800 MG: 800 TABLET, FILM COATED ORAL at 12:20

## 2021-09-14 RX ADMIN — ACETAMINOPHEN 650 MG: 325 TABLET, FILM COATED ORAL at 04:31

## 2021-09-14 RX ADMIN — IBUPROFEN 800 MG: 800 TABLET, FILM COATED ORAL at 04:31

## 2021-09-14 RX ADMIN — ACETAMINOPHEN 650 MG: 325 TABLET, FILM COATED ORAL at 20:07

## 2021-09-14 RX ADMIN — ACETAMINOPHEN 650 MG: 325 TABLET, FILM COATED ORAL at 16:09

## 2021-09-14 NOTE — PLAN OF CARE
Data: Sherita Glasgow transferred to [id number] via wheelchair at 2116. Baby transferred via parent's arms.  Action: Receiving unit notified of transfer: Yes. Patient and family notified of room change. Report given to Junie at 2118. Belongings sent to receiving unit. Accompanied by Registered Nurse. Oriented patient to surroundings. Call light within reach. ID bands double-checked with receiving RN.  Response: Patient tolerated transfer and is stable.

## 2021-09-14 NOTE — L&D DELIVERY NOTE
Delivery Summary    Sherita lGasgow MRN# [id number]   Age: 36 year old YOB: 1985     ASSESSMENT & PLAN:   DELIVERY NOTE:  Brief Labor Course: Patient admitted in spontaneous active labor.  Was planning water birth, and was in tub when audible decreases noted with intermittent monitoring.  Patient assisted out of water birth tub and continuous EFM applied.  Variable decelerations noted.  Patient consented to cervical exam, noted to be completely dilated and began second stage active pushing efforts.   Delivery Note:   Patient pushed on her side and then on her back with steady progress during active pushing efforts.  FHT's with continued variable decelerations during second stage, moderate variability maintained throughout.  NICU called to room for birth due to variable decelerations.  Fetal head brought to a crown, delivered MOA, restituted direct OP, then to ROT with left anterior shoulder delivery.  Anterior shoulder delivered with ease under maternal efforts and gentle downward traction.  Vigorous male infant placed on maternal abdomen.  IM Pitocin given.  Delayed cord clamping completed and cut by father of the baby.  Cord segment sent for gases.  Placenta delivered spontaneously via Schultze mechanism with 3 vessel cord.  Perineum inspected small right periurethral laceration noted, hemostatic not repaired.  2nd degree perineal laceration identified and repaired under local anesthetic in the usual fashion using 3-0 Vicryl.  Mother and baby stable in recovery.    IUP at 4.1 weeks gestation delivered on 2021.     delivery of a viable Male infant.  Weight : 8 pounds 13 ounces, 4000gms  Apgars of 8 at 1 minute and 9 at 5 minutes.  Labor was spontaneous.  Medications administered  in labor:  Pain Rx None; Antibiotics No; Other   Perineum: 2nd degree, Periurethral laceration  Placenta-mechanism: spontaneous, intact,  with a 3 vessel cord. IM oxytocin was given After delivery of baby  Before delivery of placenta  Quantitative Blood Loss was 900cc.  Complications of labor and delivery: Category two FHT tracing  Anticipated Discharge Date: 09/15/2021  Birth attendants: VLADISLAV Bond APRN CNM       Brendon GlasgowLisetteSherita [[id number]]      Labor Event Times      Labor onset date: 21 Onset time: 12:02 PM    Complete time:  6:10 PM   Start pushing date/time: 2021 1813          Labor Events     labor?: No   steroids: None  Labor Type: Spontaneous  Predominate monitoring during 1st stage: intermittent auscultation, continuous electronic fetal monitoring     Antibiotics received during labor?: No       Rupture date/time: 21 1625   Rupture type: Spontaneous rupture of membranes occuring during spontaneous labor or augmentation  Fluid color: Clear  Fluid odor: Normal     Augmentation: None  1:1 continuous labor support provided by?:  Labor partogram used?: no          Delivery/Placenta Date and Time      Delivery Date: 21 Delivery Time:  6:40 PM   Oxytocin given at the time of delivery: after delivery of baby  Delivering clinician: Dina Liz APRN CNM   Other personnel present at delivery:  Provider Role   Maryellen Castro RN Delivery Assist   Angelica Zhang RN              Vaginal Counts       Initial count performed by 2 team members:  Two Team Members   Maryellen Liz CNM         Stout Suture Needles Sponges (RETIRED) Instruments   Initial counts 2  5    Added to count       Relief counts       Final counts               Placed during labor Accounted for at the end of labor   FSE No NA   IUPC No NA   Cervadil No NA                             Apgars    Living status: Living   1 Minute 5 Minute 10 Minute 15 Minute 20 Minute   Skin color:        Heart rate:        Reflex irritability:        Muscle tone:        Respiratory effort:        Total:               Cord      Cord Complications: None               Cord  Blood Disposition: Lab    Gases Sent?: Yes    Delayed cord clamping?: Yes    Stem cell collection?: No           Labor Events and Shoulder Dystocia    Fetal Tracing Prior to Delivery: Category 2  Fetal Tracing Comments: Variable decelerations noted just prior to and during second stage.  Shoulder dystocia present?: Neg       Delivery (Maternal) (Provider to Complete) (002313)    Episiotomy: None  Perineal lacerations: 2nd Repaired?: Yes     Periurethral laceration: right Repaired?: No   Repair suture: 3-0 Vicryl  Number of repair packets: 1  Genital tract inspection done: Pos       Blood Loss  Mother: Sherita Glasgow #[id number]     Start of Mother's Information      Delivery Blood Loss  09/13/21 1202 - 09/13/21 1907      None                 End of Mother's Information  Mother: Sherita Glasgow #[id number]                Delivery - Provider to Complete ([id number])    Delivering clinician: Dina Liz APRN CNM  CNM Care: Exclusive CNM care in labor  Attempted Delivery Types (Choose all that apply): Spontaneous Vaginal Delivery  Delivery Type (Choose the 1 that will go to the Birth History): Vaginal, Spontaneous                         Other personnel:  Provider Role   Maryellen Castro, RN Delivery Assist   Angelica Zhang RN                     Placenta    Removal: Spontaneous  Comments: Intact via schultze mechanism with 3 vessel cord  Disposition: Hospital disposal             Anesthesia    Method: None                           BINH Cummins CNM

## 2021-09-14 NOTE — PLAN OF CARE
Patient arrived to [facility] unit via wheelchair at 2120,with belongings, accompanied by spouse/ significant other, with infant in arms. Received report from STEFANI Rodriguez and checked bands. Unit and room orientation completd. Call light given and within arms reach; no concerns present at this time. Continue with plan of care.

## 2021-09-14 NOTE — PROVIDER NOTIFICATION
09/13/21 1804   Provider Notification   Provider Name/Title Dina Liz CNM   Method of Notification Electronic Page   Request Evaluate in Person   Notification Reason Decels   Late entry due to patient care: Decels heard on IA while pt in birthing tub. RN assisted pt out of tub to the bed to apply external fetal monitors. FHR in 90's when monitor applied. Pt repositioned and midwife called. Prolonged decel to 90s for 4 minutes with spontaneous return to baseline. Dina Liz at bedside and pt found to be complete. Pushing started at 1813.  NICU updated.

## 2021-09-14 NOTE — PLAN OF CARE
Data: Vital signs within normal limits. Postpartum checks within normal limits - see flow record. Patient eating and drinking normally. Patient able to empty bladder independently and is up ambulating. Perineum healing well. Patient performing self cares and is able to care for infant.  Action: Patient medicated during the shift for perineum soreness.   Response: Positive attachment behaviors observed with infant.   Will continue to monitor and provide support.

## 2021-09-14 NOTE — PROGRESS NOTES
Postpartum Day #1 Note:  SIGNIFICANT PROBLEMS:  Patient Active Problem List    Diagnosis Date Noted     Labor and delivery indication for care or intervention 2021     Priority: Medium      (normal spontaneous vaginal delivery) 2021     Priority: Medium     Supervision of other normal pregnancy 2021     Priority: Medium     [facility] CNM pt  Partner's name: John  [x ] Entered on Epic list  [x ] NOB folder  [x ] Dating  [ ] First tri screen ordered; UNDECIDED, address at NOB  [ ] QS/AFP ordered declined  X] Fetal anatomy US ordered  [X] Rubella immune  [x ] Hep B immune  [x ] Pap - Last pap 18, NILM  [NA ] Started ASA   [NA ] NO plan utox in labor   _____________________________________  [x ] EOB folder  [X] PP Contraception plan: PP Contraceptive plans: Has used condoms in the past, may consider an IUD. Has a history of menstrual migraines, may choose combination estrogen/progesterone to manage her migraines better.  [x ] Labor plans: unmedicated  [x ] : Juan Ramon Odonnellleva  [x ] Infant feeding plan breast  [x ] FLU shot - received 21  [x ] TDAP given  [NA ] Rhogam if needed, date:  [NA] TOLAC consent done  [x ] Waterbirth consent done  [ x] GCT, passed  ________________________________________  [X] GBS NEGATIVE  [ ] OTC PP meds sent           Thrombocytopenia (H) 2018     Priority: Medium     : Recheck q 4wks, then weekly at 36wks    2021: plts 134  21: plts 134  21:        Migraine without aura and without status migrainosus, not intractable 2018     Priority: Medium     21 - migraines stable   18  Has discussed with dr. Rayo, who says her meds are safe in pregnancy. Pt unsure still...  Given contact info for Dr. Godoy       Adjustment disorder with mixed anxiety and depressed mood 2018     Priority: Medium     Celexa Rx given, seeing therapist  : has not started med yet-considering  : decided against initiation during preg,  will ask if she wants during pp period.         INTERVAL HISTORY:  /63   Pulse 63   Temp 98.1  F (36.7  C) (Oral)   Resp 16   LMP 11/29/2020   Breastfeeding Unknown   Pt stable, baby is rooming in.   Breast feeding status: initiated and going well with RN assistance.  Complications since 2 hours post delivery: None  Patient is tolerating activity well. Voiding without difficulty, no BM yet.  Cramping is minimal and is relieved by ibuprofen, lochia is decreasing and patient denies clots.  Perineal pain is relieved by ibuprofen and ice pack.  The perineum laceration is well approximated.    Physical Exam:  Breasts: soft, nontender  Abdomen: soft, nontender, fundus at 2 below U  Lochia: small amount, rubra, no clots or odor  Perineum: well approximated, no edema  Extremities: no edema    Postpartum hemoglobin   Hemoglobin   Date Value Ref Range Status   09/13/2021 13.8 11.7 - 15.7 g/dL Final   06/15/2021 11.9 11.7 - 15.7 g/dL Final     Blood type   Lab Results   Component Value Date    ABO A 02/08/2021       Lab Results   Component Value Date    RH Pos 02/08/2021     Rubella status: immune  History of depression: denies. Postpartum depression warning signs reviewed.    ASSESSMENT/PLAN:  Normal postpartum exam, stable postpartum day #1  Complications:none  Plan d/c home tomorrow. Home Visit Ordered- No  RTC 2 weeks and 6 weeks  Continue prenatal vitamins.  Has OTC postpartum meds.  Birthcontrol planned: discuss prior to discharge    Current Discharge Medication List      CONTINUE these medications which have NOT CHANGED    Details   !! acetaminophen (TYLENOL) 325 MG tablet Take 2 tablets (650 mg) by mouth every 6 hours as needed for mild pain Start after Delivery.  Qty: 100 tablet, Refills: 0    Associated Diagnoses: Supervision of other normal pregnancy      !! acetaminophen (TYLENOL) 500 MG tablet Take 1,000 mg by mouth every 6 hours as needed (headache)      docusate sodium (COLACE) 100 MG tablet Take 1  tablet (100 mg) by mouth daily  Qty: 30 tablet, Refills: 1    Associated Diagnoses: Supervision of other normal pregnancy      Omega-3 Fatty Acids (OMEGA 3 500 PO) Take 1 capsule by mouth 1-2 times per week      Prenatal Vit-Fe Fumarate-FA (PRENATAL MULTIVITAMIN PLUS IRON) 27-0.8 MG TABS per tablet Take 1 tablet by mouth daily      VITAMIN D, CHOLECALCIFEROL, PO Take 2,000 Units by mouth daily       !! - Potential duplicate medications found. Please discuss with provider.        BINH Reese CNM

## 2021-09-14 NOTE — PLAN OF CARE
Having cramping/soreness of uterus and perineum but  well controlled with motrin and tylenol. She also uses ice, witch hazel pad and shane bottle which are helpful. Encouraged to soak in the tub and plans to do that this evening. Breastfeeding on demand. Will continue with plan of care.

## 2021-09-14 NOTE — PLAN OF CARE
Data: Vital signs within normal limits. Postpartum checks within normal limits - see flow record. Patient eating and drinking normally. Patient able to empty bladder independently and is up ambulating with minimal assist. Complains of some generalized weakness bu ambulating independently. No apparent signs of infection.  Laceration  healing well. Utilizing shane bottle, ice packs, and tucks pads for comfort. Patient performing self cares and is able to care for infant.  Action: Patient medicated during the shift for pain with Tylenol and Ibuprofen. See MAR. Patient reassessed within 1 hour after each medication and pain was improved - patient stated she was comfortable. Patient education done about pain management and activity. Encouraged to empty bladder frequently. See flow record.  Response: Positive attachment behaviors observed with infant. Support persons, John, present and attentive.  Plan: Anticipate discharge on 9/16/21. Continue POC.

## 2021-09-15 VITALS
TEMPERATURE: 97.9 F | DIASTOLIC BLOOD PRESSURE: 70 MMHG | RESPIRATION RATE: 18 BRPM | SYSTOLIC BLOOD PRESSURE: 109 MMHG | HEART RATE: 72 BPM

## 2021-09-15 PROCEDURE — 250N000013 HC RX MED GY IP 250 OP 250 PS 637: Performed by: ADVANCED PRACTICE MIDWIFE

## 2021-09-15 RX ADMIN — ACETAMINOPHEN 650 MG: 325 TABLET, FILM COATED ORAL at 00:49

## 2021-09-15 RX ADMIN — IBUPROFEN 800 MG: 800 TABLET, FILM COATED ORAL at 08:37

## 2021-09-15 RX ADMIN — IBUPROFEN 800 MG: 800 TABLET, FILM COATED ORAL at 00:49

## 2021-09-15 RX ADMIN — DOCUSATE SODIUM 100 MG: 100 CAPSULE, LIQUID FILLED ORAL at 08:37

## 2021-09-15 RX ADMIN — ACETAMINOPHEN 650 MG: 325 TABLET, FILM COATED ORAL at 06:16

## 2021-09-15 NOTE — PROVIDER NOTIFICATION
09/14/21 2050   Provider Notification   Provider Name/Title onurda   Method of Notification Electronic Page   Request Evaluate-Remote   Notification Reason Status Update   notifying provider that pt scored a 10 on the EDS, score of 0 on the last question. History of anxiety and depression, not on meds at this time.May start post partum, see history note on Celexa script given but did not want to start while pregnant. Will put in social work consult.

## 2021-09-15 NOTE — PLAN OF CARE
Data: Vital signs within normal limits. Postpartum checks within normal limits - see flow record. Patient eating and drinking normally. Patient able to empty bladder independently and is up ambulating. No apparent signs of infection.  Laceration  healing well. Utilizing shane bottle and medicated pads for comfort. Complains of tender nipples. Using own nipple butter. Patient performing self cares and is able to care for infant.  Action: Patient medicated during the shift for pain with Tylenol and Ibuprofen. See MAR. Patient reassessed within 1 hour after each medication and pain was improved - patient stated she was comfortable. Patient education done about pain management and activity. Discussed normal amounts of bleeding postpartum and when to notify. See flow record.  Response: Positive attachment behaviors observed with infant. Support persons, John, present and attentive.  Plan: Anticipate discharge on 9/15/21. Continue POC.

## 2021-09-15 NOTE — DISCHARGE SUMMARY
[facility] Discharge Summary    Sherita Glasgow MRN# [id number]   Age: 36 year old YOB: 1985     Date of Admission:  2021  Date of Discharge::  9/15/2021  Admitting Physician:  BINH Cummins CNM  Discharge Physician:  BINH Armijo CNM, VLADISLAV, MS      Home clinic: [facility]          Admission Diagnoses:   Labor and delivery indication for care or intervention [O75.9]   (normal spontaneous vaginal delivery) [O80]          Discharge Diagnosis:     Normal spontaneous vaginal delivery          Procedures:     Procedure(s): Repair of second degree perineal laceration       No procedures performed during this admission           Medications Prior to Admission:     Medications Prior to Admission   Medication Sig Dispense Refill Last Dose     acetaminophen (TYLENOL) 325 MG tablet Take 2 tablets (650 mg) by mouth every 6 hours as needed for mild pain Start after Delivery. 100 tablet 0 More than a month at Unknown time     acetaminophen (TYLENOL) 500 MG tablet Take 1,000 mg by mouth every 6 hours as needed (headache)   More than a month at Unknown time     docusate sodium (COLACE) 100 MG tablet Take 1 tablet (100 mg) by mouth daily 30 tablet 1 2021 at Unknown time     Omega-3 Fatty Acids (OMEGA 3 500 PO) Take 1 capsule by mouth 1-2 times per week   2021 at Unknown time     Prenatal Vit-Fe Fumarate-FA (PRENATAL MULTIVITAMIN PLUS IRON) 27-0.8 MG TABS per tablet Take 1 tablet by mouth daily   2021 at Unknown time     VITAMIN D, CHOLECALCIFEROL, PO Take 2,000 Units by mouth daily   Past Month at Unknown time             Discharge Medications:     Current Discharge Medication List      CONTINUE these medications which have NOT CHANGED    Details   !! acetaminophen (TYLENOL) 325 MG tablet Take 2 tablets (650 mg) by mouth every 6 hours as needed for mild pain Start after Delivery.  Qty: 100 tablet, Refills: 0    Associated Diagnoses: Supervision of  other normal pregnancy      !! acetaminophen (TYLENOL) 500 MG tablet Take 1,000 mg by mouth every 6 hours as needed (headache)      docusate sodium (COLACE) 100 MG tablet Take 1 tablet (100 mg) by mouth daily  Qty: 30 tablet, Refills: 1    Associated Diagnoses: Supervision of other normal pregnancy      Omega-3 Fatty Acids (OMEGA 3 500 PO) Take 1 capsule by mouth 1-2 times per week      Prenatal Vit-Fe Fumarate-FA (PRENATAL MULTIVITAMIN PLUS IRON) 27-0.8 MG TABS per tablet Take 1 tablet by mouth daily      VITAMIN D, CHOLECALCIFEROL, PO Take 2,000 Units by mouth daily       !! - Potential duplicate medications found. Please discuss with provider.                Consultations:   No consultations were requested during this admission          Brief History of Labor:     DELIVERY NOTE:  Brief Labor Course: Patient admitted in spontaneous active labor.  Was planning water birth, and was in tub when audible decreases noted with intermittent monitoring.  Patient assisted out of water birth tub and continuous EFM applied.  Variable decelerations noted.  Patient consented to cervical exam, noted to be completely dilated and began second stage active pushing efforts.   Delivery Note:   Patient pushed on her side and then on her back with steady progress during active pushing efforts.  FHT's with continued variable decelerations during second stage, moderate variability maintained throughout.  NICU called to room for birth due to variable decelerations.  Fetal head brought to a crown, delivered MOA, restituted direct OP, then to ROT with left anterior shoulder delivery.  Anterior shoulder delivered with ease under maternal efforts and gentle downward traction.  Vigorous male infant placed on maternal abdomen.  IM Pitocin given.  Delayed cord clamping completed and cut by father of the baby.  Cord segment sent for gases.  Placenta delivered spontaneously via Schultze mechanism with 3 vessel cord.  Perineum inspected small right  "periurethral laceration noted, hemostatic not repaired.  2nd degree perineal laceration identified and repaired under local anesthetic in the usual fashion using 3-0 Vicryl.  Mother and baby stable in recovery.    IUP at 4.1 weeks gestation delivered on 2021.     delivery of a viable Male infant.  Weight : 8 pounds 13 ounces, 4000gms  Apgars of 8 at 1 minute and 9 at 5 minutes.  Labor was spontaneous.  Medications administered  in labor:  Pain Rx None; Antibiotics No; Other   Perineum: 2nd degree, Periurethral laceration  Placenta-mechanism: spontaneous, intact,  with a 3 vessel cord. IM oxytocin was given After delivery of baby Before delivery of placenta  Quantitative Blood Loss was 900cc.  Complications of labor and delivery: Category two FHT tracing  Anticipated Discharge Date: 09/15/2021  Birth attendants: VLADISLAV Bond APRN CNM     Assessment Day of Discharge      Breasts: soft, filling  Nipples:intact, without lesion  Fundus: firm @ umbilicus, midline, nontender  Abdomen: soft, bowel sounds present, nontender  Lochia: small rubra, no clots,  No odor  Perineum: well approximated, healing well, no erythema, edema, bruising, hematoma or s/s of infection  Legs: no edema, tenderness, or erythema           Hospital Course:     INTERVAL HISTORY:  /70 (BP Location: Right arm)   Pulse 72   Temp 97.9  F (36.6  C) (Oral)   Resp 18   LMP 2020   Breastfeeding Unknown    No fever  Pt stable, baby \"Eitan\" is rooming in.   Breast feeding status:well established. Has been feeding on cue with some assistance from nursing staff. Pt feel she has gotten many good tips and is comfortable with breastfeeding independently.   Complications since 2 hours post delivery: None  Patient is tolerating activity well, and normal diet. Voiding without difficulty, no bowel movement yet, cramping is relieved by Ibuprofen, lochia is decreasing and patient denies clots.  Perineal pain is " minimal with Ibuprofen.      Postpartum hemoglobin   Recent Labs   Lab 09/14/21  0940 09/13/21  1218   HGB 11.4* 13.8      Blood type   Lab Results   Component Value Date    ABO A 02/08/2021    RH Pos 02/08/2021      Rubella status: Immune      History of Anxiety/depressed mood. Pt was prescribed Celexa during pregnancy, but decided not to start. She has rx available at home and will consider starting if symptomatic. Postpartum depression warning signs reviewed. Discussed to call and discuss medication start if she is feeling down/anxious.       ASSESSMENT/PLAN:  Normal postpartum exam , Stable Post-partum day #2  Complications:none  Home Visit Ordered- No  Plan d/c home today  Follow-up: 2 weeks with phone visit and 6-8 weeks in clinic  Teaching done: D/C Instructions: Nutrition/Activity, Engorgement Management, Birth Control Options, Warning Signs/When to Call: Excessive Bleeding, Infection, PP Depression, RTC Clinic for PP Appointment and PNV    Postpartum warning s/s reviewed, including bleeding/clots, fever, mastitis, or depression, Kegels/ crunches.    Continue prenatal vitamins  Discharge medications ordered- has available at home     Birthcontrol planned: Considering options. Has hormonal migraines and would like something that may help with this as well. Discussed progestin only methods. Reviewed LNG IUD in detail. Pt is considering Mirena for her 6 week PP visit.     Plan d/c home today.   RTC 2 weeks         Discharge Instructions and Follow-Up:     Discharge diet: Regular   Discharge activity: Pelvic rest: abstain from intercourse and do not use tampons for 6 week(s)   Discharge follow-up: Follow up with [facility] in 2 weeks   Wound care: Drink plenty of fluids  Ice to area for comfort  Keep wound clean and dry  Witchhazel pads  Warm sitz baths            Discharge Disposition:     Discharged to home        BINH Armijo, VLADISLAV

## 2021-09-15 NOTE — PROGRESS NOTES
Text page received from RN that patient scored 10 on EPDS, SW consult placed by nursing. Patient was on celexa in the past, may want to restart it PP. Will pass on to CNM who will round in a.m. to discuss prior to discharge.  Nilo Mendoza CNM

## 2021-09-15 NOTE — PLAN OF CARE
VSS. Afebrile. Discharge instructions reviewed and given to pt. C/o some pain and medicated with relief. Breast feeding but needs help with a deeper latch. Offered lactation but pt declined as she wanted to discharge soon. Discharged today with baby.

## 2021-09-15 NOTE — PLAN OF CARE
Data: Vital signs within normal limits. Postpartum checks within normal limits - see flow record. Patient eating and drinking normally. Patient able to empty bladder independently and is up ambulating. No apparent signs of infection.  perineum  healing well. Patient performing self cares and is able to care for infant.  Action: Patient medicated during the shift for pain and cramping. See MAR. Patient reassessed within 1 hour after each medication and pain was improved - patient stated she was comfortable. Patient education done about pain management and discharge info. See flow record.  Response: Positive attachment behaviors observed with infant. Support persons John present.   Plan: Anticipate discharge on 9/15.

## 2021-09-15 NOTE — CONSULTS
"[facility]  MATERNAL CHILD HEALTH   SOCIAL WORK PROGRESS NOTE    DATA:     Pt is a 35yo  who delivered a term infant on 2021. SW was consulted to meet with pt for EPDS of 10 (answering \"no\" to question 10), and history of anxiety/depression.    INTERVENTION:       Chart review    Communication with interdisciplinary team, primarily  o STEFANI Tafoya    Assessed pt needs via bedside visit with Mom and Baby    ASSESSMENT:     Pt is already connected with a therapist and has experienced some postpartum anxiety with her previous child. She knows what to look for and when to be concerned, and has good support in the community. She and her  have open conversations about mental health, and pt also feels she can talk about mood with her midwifery team.    PLAN:     Pt plans to discharge today with all resources needed to manage her mental health.    Delphine Payan [facility]  Clinical   Maternal Child Health  [facility]  Direct: [phone]  Pager: [phone]  After Hours SW: [phone]      "

## 2021-09-21 ENCOUNTER — TELEPHONE (OUTPATIENT)
Dept: OBGYN | Facility: CLINIC | Age: 36
End: 2021-09-21

## 2021-09-21 ENCOUNTER — MYC MEDICAL ADVICE (OUTPATIENT)
Dept: OBGYN | Facility: CLINIC | Age: 36
End: 2021-09-21

## 2021-09-21 NOTE — TELEPHONE ENCOUNTER
Left message for patient to call back and schedule a 2 week PP telephone visit with Bela Talbot on either 10/6 or 10/8.    Chantal Mckee  Clinical Services Assistant

## 2021-09-21 NOTE — TELEPHONE ENCOUNTER
[facility] Call Center    Phone Message    May a detailed message be left on voicemail: yes     Reason for Call: Other: Sherita calling to schedule her 2 & 6 week postpartum appts. 6 week apt scheduled, however, writer could not get an appointment within 2 weeks w/a midwife. Sherita delivered on 9/13/21. Please reach out to Sherita to set up her two week post partum telephone appointment. Thank you!     Action Taken: Message routed to:  Other: kenrick rn    Travel Screening: Not Applicable

## 2021-10-05 NOTE — PROGRESS NOTES
2-week Postpartum Phone Visit:     Assessment:   35yo  at 2 weeks postpartum   NSVB 21, 2nd laceration  Lactating mother  Contraceptive Counseling, undecided re combination method versus Mirena  Hx of menstrual migraines no aura  Hx of PPH, will need CBC at 6 wk pp visit  Last pap 2018, NIL, HPV      Plan:   1. Adjustment to parenting, self care and importance of a support system discussed. Postpartum education given including: postpartum mood changes and postpartum depression.   2. Return of fertility discussed. Undecided regarding method of birth control - Discussed combination method versus Mirena to control her menstrual migraines. Discussed that combination method will likely control her menstrual migraines better than Mirena. However, she may have lactational anovulation for 7 months again. Sherita will consider her options.   Advised to abstain from intercourse until she has completed her 6 week physical examination.   3. Nutrition and supplements reviewed.  Advised continuation of a prenatal or multivitamin, also Vitamin D3 2000 IU geltab daily and an omega 3 fatty acid supplement.   4. Mental health: Encouraged good nutrition, protected sleep time, adequate support. Encouraged Sherita to seek out additional support with the baby  5. PPH with her birth, will plan CBC at 6 weeks pp  6. Follow-up at 6 weeks postpartum for routine visit or sooner as needed.    Phone conversation 11:35-12:05 =30 min      BINH Carrillo CNM     Subjective:      Sherita Glasgow is a 36 year old  female who requests a phone visit for her 2 week pp visit. She is 2 weeks postpartum following a spontaneous vaginal delivery. I have fully reviewed the prenatal and intrapartum course. The delivery was at 41/1 gestational weeks. Outcome: spontaneous vaginal delivery. The amount and color of the lochia is appropriate for the duration of recovery. The patient feels well. The baby is well and being  "fed by breast. Plans to breastfeed for 1 year. She got her period back after 7-9 months after Siddharth  Baby boy \"Eitan\" was 8 lbs 13 oz, 2nd degree laceration repaired. 900cc blood loss. Sherita is not currently taking her iron supplement, she is taking a prenatal vitamin.   Sherita was planning a waterbirth, but had variable decels and ended up needing to get out of the tub.  Voiding without difficulty, lochia normal, tolerating normal diet, and passing flatus.  Pain is well controlled with current medications.    Sherita has been going on 15 minute walks  Sherita has noticed that her bleeding has been about the same as it was a week ago. She is changing her pad 2-3 times a day. She has been taking a bath once a day  Sleeping 5-6 hours, trying to sleep when the baby sleeps during the day. This week it is harder to sleep during the day. She can utilize John a little during the day, as he works from home. Sherita does not have anyone else to ask for to help.     Sherita and John are adjusting okay to having two children.  Weekends Siddharth is home from day care, so weekends are \"no longer relaxing\".    Sherita has a history of menstrual migraines and is considering Mirena versus OCPs  Sherita has a history of three days of migraines during her luteal phase  Sherita does have light sensitivity, but denies aura.     Sherita has noticed some baby blues, some days are worse when she does not get enough sleep  The following portions of the patient's history were reviewed and updated as appropriate: allergies, current medications and problem list    Review of Systems  General:  Denies problem  Eyes: Denies problem  Ears/Nose/Throat: Denies problem  Cardiovascular: Denies problem  Respiratory:  Denies problem  Gastrointestinal:  Denies problem, Genitourinary: Denies problem  Musculoskeletal:  Denies problem  Skin: Denies problem  Neurologic: Denies problem  Psychiatric: Denies problem  Endocrine: Denies " problem  Heme/Lymphatic: Denies problem   Allergic/Immunologic: Denies problem    Objective:      Phone visit, no vitals assessed

## 2021-10-06 ENCOUNTER — VIRTUAL VISIT (OUTPATIENT)
Dept: OBGYN | Facility: CLINIC | Age: 36
End: 2021-10-06
Attending: ADVANCED PRACTICE MIDWIFE
Payer: COMMERCIAL

## 2021-10-06 PROCEDURE — 99207 PR NO BILLABLE SERVICE THIS VISIT: CPT | Performed by: ADVANCED PRACTICE MIDWIFE

## 2021-10-06 NOTE — LETTER
10/6/2021     RE: Sherita Glasgow  [address]  [facility] [address]     Dear Colleague,    Thank you for referring your patient, Sherita Glasgow, to the [facility] [place] at [facility]. Please see a copy of my visit note below.    2-week Postpartum Phone Visit:     Assessment:   35yo  at 2 weeks postpartum   NSVB 21, 2nd laceration  Lactating mother  Contraceptive Counseling, undecided re combination method versus Mirena  Hx of menstrual migraines no aura  Hx of PPH, will need CBC at 6 wk pp visit  Last pap 2018, NIL, HPV    Plan:   1. Adjustment to parenting, self care and importance of a support system discussed. Postpartum education given including: postpartum mood changes and postpartum depression.   2. Return of fertility discussed. Undecided regarding method of birth control - Discussed combination method versus Mirena to control her menstrual migraines. Discussed that combination method will likely control her menstrual migraines better than Mirena. However, she may have lactational anovulation for 7 months again. Sherita will consider her options.   Advised to abstain from intercourse until she has completed her 6 week physical examination.   3. Nutrition and supplements reviewed.  Advised continuation of a prenatal or multivitamin, also Vitamin D3 2000 IU geltab daily and an omega 3 fatty acid supplement.   4. Mental health: Encouraged good nutrition, protected sleep time, adequate support. Encouraged Sherita to seek out additional support with the baby  5. PPH with her birth, will plan CBC at 6 weeks pp  6. Follow-up at 6 weeks postpartum for routine visit or sooner as needed.    Phone conversation 11:35-12:05 =30 min      BINH Carrillo, VLADISLAV     Subjective:      Sherita Glasgow is a 36 year old  female who requests a phone visit for her 2 week pp visit. She is 2 weeks postpartum  "following a spontaneous vaginal delivery. I have fully reviewed the prenatal and intrapartum course. The delivery was at 41/1 gestational weeks. Outcome: spontaneous vaginal delivery. The amount and color of the lochia is appropriate for the duration of recovery. The patient feels well. The baby is well and being fed by breast. Plans to breastfeed for 1 year. She got her period back after 7-9 months after Siddharth  Baby boy \"Eitan\" was 8 lbs 13 oz, 2nd degree laceration repaired. 900cc blood loss. Sherita is not currently taking her iron supplement, she is taking a prenatal vitamin.   Sherita was planning a waterbirth, but had variable decels and ended up needing to get out of the tub.  Voiding without difficulty, lochia normal, tolerating normal diet, and passing flatus.  Pain is well controlled with current medications.    Sherita has been going on 15 minute walks  Sherita has noticed that her bleeding has been about the same as it was a week ago. She is changing her pad 2-3 times a day. She has been taking a bath once a day  Sleeping 5-6 hours, trying to sleep when the baby sleeps during the day. This week it is harder to sleep during the day. She can utilize John a little during the day, as he works from home. Sherita does not have anyone else to ask for to help.     Sherita and John are adjusting okay to having two children.  Weekends Siddharth is home from day care, so weekends are \"no longer relaxing\".    Sherita has a history of menstrual migraines and is considering Mirena versus OCPs  Sherita has a history of three days of migraines during her luteal phase  Sherita does have light sensitivity, but denies aura.     Sherita has noticed some baby blues, some days are worse when she does not get enough sleep  The following portions of the patient's history were reviewed and updated as appropriate: allergies, current medications and problem list    Review of Systems  General:  Denies problem  Eyes: " Denies problem  Ears/Nose/Throat: Denies problem  Cardiovascular: Denies problem  Respiratory:  Denies problem  Gastrointestinal:  Denies problem, Genitourinary: Denies problem  Musculoskeletal:  Denies problem  Skin: Denies problem  Neurologic: Denies problem  Psychiatric: Denies problem  Endocrine: Denies problem  Heme/Lymphatic: Denies problem   Allergic/Immunologic: Denies problem    Objective:    Phone visit, no vitals assessed

## 2021-10-10 ENCOUNTER — HEALTH MAINTENANCE LETTER (OUTPATIENT)
Age: 36
End: 2021-10-10

## 2021-10-26 ENCOUNTER — LAB (OUTPATIENT)
Dept: LAB | Facility: CLINIC | Age: 36
End: 2021-10-26
Attending: ADVANCED PRACTICE MIDWIFE
Payer: COMMERCIAL

## 2021-10-26 ENCOUNTER — OFFICE VISIT (OUTPATIENT)
Dept: OBGYN | Facility: CLINIC | Age: 36
End: 2021-10-26
Attending: ADVANCED PRACTICE MIDWIFE
Payer: COMMERCIAL

## 2021-10-26 VITALS
HEIGHT: 65 IN | HEART RATE: 82 BPM | SYSTOLIC BLOOD PRESSURE: 117 MMHG | BODY MASS INDEX: 23.69 KG/M2 | DIASTOLIC BLOOD PRESSURE: 80 MMHG | WEIGHT: 142.2 LBS

## 2021-10-26 PROBLEM — D69.6 THROMBOCYTOPENIA (H): Status: RESOLVED | Noted: 2018-09-25 | Resolved: 2021-10-26

## 2021-10-26 PROBLEM — Z34.80 SUPERVISION OF OTHER NORMAL PREGNANCY: Status: RESOLVED | Noted: 2021-02-08 | Resolved: 2021-10-26

## 2021-10-26 LAB
ERYTHROCYTE [DISTWIDTH] IN BLOOD BY AUTOMATED COUNT: 11.1 % (ref 10–15)
HCT VFR BLD AUTO: 39 % (ref 35–47)
HGB BLD-MCNC: 13.2 G/DL (ref 11.7–15.7)
MCH RBC QN AUTO: 30.3 PG (ref 26.5–33)
MCHC RBC AUTO-ENTMCNC: 33.8 G/DL (ref 31.5–36.5)
MCV RBC AUTO: 89 FL (ref 78–100)
PLATELET # BLD AUTO: 222 10E3/UL (ref 150–450)
RBC # BLD AUTO: 4.36 10E6/UL (ref 3.8–5.2)
WBC # BLD AUTO: 5.9 10E3/UL (ref 4–11)

## 2021-10-26 PROCEDURE — 99207 PR POST PARTUM EXAM: CPT | Performed by: ADVANCED PRACTICE MIDWIFE

## 2021-10-26 PROCEDURE — G0463 HOSPITAL OUTPT CLINIC VISIT: HCPCS | Performed by: ADVANCED PRACTICE MIDWIFE

## 2021-10-26 PROCEDURE — 250N000011 HC RX IP 250 OP 636

## 2021-10-26 PROCEDURE — 36415 COLL VENOUS BLD VENIPUNCTURE: CPT

## 2021-10-26 PROCEDURE — 90686 IIV4 VACC NO PRSV 0.5 ML IM: CPT

## 2021-10-26 PROCEDURE — G0008 ADMIN INFLUENZA VIRUS VAC: HCPCS

## 2021-10-26 PROCEDURE — 85027 COMPLETE CBC AUTOMATED: CPT

## 2021-10-26 ASSESSMENT — ANXIETY QUESTIONNAIRES
GAD7 TOTAL SCORE: 5
6. BECOMING EASILY ANNOYED OR IRRITABLE: SEVERAL DAYS
7. FEELING AFRAID AS IF SOMETHING AWFUL MIGHT HAPPEN: NOT AT ALL
3. WORRYING TOO MUCH ABOUT DIFFERENT THINGS: SEVERAL DAYS
1. FEELING NERVOUS, ANXIOUS, OR ON EDGE: SEVERAL DAYS
2. NOT BEING ABLE TO STOP OR CONTROL WORRYING: SEVERAL DAYS
5. BEING SO RESTLESS THAT IT IS HARD TO SIT STILL: NOT AT ALL

## 2021-10-26 ASSESSMENT — PATIENT HEALTH QUESTIONNAIRE - PHQ9: 5. POOR APPETITE OR OVEREATING: SEVERAL DAYS

## 2021-10-26 ASSESSMENT — MIFFLIN-ST. JEOR: SCORE: 1345.89

## 2021-10-26 NOTE — NURSING NOTE
SUBJECTIVE:   Sherita Glasgow is here for her 6-week postpartum checkup.     PHQ-9 score: 6  Hx of Abuse:  No    Delivery Date: 2021.    Delivering provider:  Dina Liz CNM.    Type of delivery:  .  Perineum:  tear, with repair.     Delivery complications: None  Infant gender:  boy, weight 8 pounds 13.1 oz.  Feeding Method:  .  Complications reported with feeding:  none, infant thriving .    Bleeding:  None.  Duration:  5weeks.  Menses resumed:  No  Bowel/Urinary problems:  No    Contraception Planned:  Will discuss  She  has not had intercourse since delivery..

## 2021-10-26 NOTE — LETTER
"10/26/2021       RE: Sherita Glasgow  [address]  [facility] [address]     Dear Colleague,    Thank you for referring your patient, Sherita Glasgow, to the [facility] [place] at [facility]. Please see a copy of my visit note below.    Nursing Notes:   Janel JaneANNAMARIA  10/26/2021 11:26 AM  Signed  SUBJECTIVE:   Sherita Glasgow is here for her 6-week postpartum checkup.     PHQ-9 score: 6  Hx of Abuse:  No    Delivery Date: 2021.    Delivering provider:  Dina Liz CNM.    Type of delivery:  .  Perineum:  tear, with repair.     Delivery complications: None  Infant gender:  boy, weight 8 pounds 13.1 oz.  Feeding Method:  .  Complications reported with feeding:  none, infant thriving .    Bleeding:  None.  Duration:  5weeks.  Menses resumed:  No  Bowel/Urinary problems:  No    Contraception Planned: Considering Mirena  She has not had intercourse since delivery.          ================================================================  ROS: 10 point ROS neg other than the symptoms noted above in the HPI.       EXAM:  /80 (BP Location: Left arm, Patient Position: Chair)   Pulse 82   Ht 1.651 m (5' 5\")   Wt 64.5 kg (142 lb 3.2 oz)   LMP 2020   BMI 23.66 kg/m      General: healthy, alert and no distress  Psych: NEGATIVE  Last PHQ-9 score on record= No Value exists for the : HP#PHQ9  Breasts:  Lactating, Nipples intact with no lesions, Non-tender and No S/S of yeast or mastitis  Abdomen: Benign, Soft, flat, non-tender, No masses, organomegaly and Diastasis less than 1-2 FB  Incision:  None   Vulva:  Normal genitalia and Bartholin's, Urethra, [place]'s normal  Vagina:  normal with good muscle tone  Cervix:  Multiparous, and no lesions.    Uterus:  fully involuted and non-tender    Adnexa:  Within normal limits and No masses, nodularity, tenderness  Recto-vaginal:   anus normal        ASSESSMENT: "   Encounter Diagnosis   Name Primary?     Routine postpartum follow-up Yes      Normal postpartum exam after   Pregnancy was complicated by:  Excessive blood loss at delivery 900cc QBL    PLAN:  Orders Placed This Encounter   Procedures     INFLUENZA VACCINE IM >6 MO VALENT IIV4 (AFLURIA/FLUZONE)     CBC with Platelets           Risks and benefits of prescribed medications discussed.  Medication instructions reviewed.  Contraception methods discussed  Discussed calcium intake, vitamins and supplements including Vitamin D  Exercise encouraged  Follow up in 1 year, or sooner for IUD insertion when she feels ready.  Physical therapy referral given   BINH Cummins CNM

## 2021-10-26 NOTE — PROGRESS NOTES
"Nursing Notes:   Janel Jane CMA  10/26/2021 11:26 AM  Signed  SUBJECTIVE:   Sherita Glasgow is here for her 6-week postpartum checkup.     PHQ-9 score: 6  Hx of Abuse:  No    Delivery Date: 2021.    Delivering provider:  Dina Liz CNM.    Type of delivery:  .  Perineum:  tear, with repair.     Delivery complications: None  Infant gender:  boy, weight 8 pounds 13.1 oz.  Feeding Method:  .  Complications reported with feeding:  none, infant thriving .    Bleeding:  None.  Duration:  5weeks.  Menses resumed:  No  Bowel/Urinary problems:  No    Contraception Planned: Considering Mirena  She has not had intercourse since delivery.          ================================================================  ROS: 10 point ROS neg other than the symptoms noted above in the HPI.       EXAM:  /80 (BP Location: Left arm, Patient Position: Chair)   Pulse 82   Ht 1.651 m (5' 5\")   Wt 64.5 kg (142 lb 3.2 oz)   LMP 2020   BMI 23.66 kg/m      General: healthy, alert and no distress  Psych: NEGATIVE  Last PHQ-9 score on record= No Value exists for the : HP#PHQ9  Breasts:  Lactating, Nipples intact with no lesions, Non-tender and No S/S of yeast or mastitis  Abdomen: Benign, Soft, flat, non-tender, No masses, organomegaly and Diastasis less than 1-2 FB  Incision:  None   Vulva:  Normal genitalia and Bartholin's, Urethra, [place]'s normal  Vagina:  normal with good muscle tone  Cervix:  Multiparous, and no lesions.    Uterus:  fully involuted and non-tender    Adnexa:  Within normal limits and No masses, nodularity, tenderness  Recto-vaginal:   anus normal        ASSESSMENT:   Encounter Diagnosis   Name Primary?     Routine postpartum follow-up Yes      Normal postpartum exam after   Pregnancy was complicated by:  Excessive blood loss at delivery 900cc QBL    PLAN:  Orders Placed This Encounter   Procedures     INFLUENZA VACCINE IM >6 MO VALENT IIV4 (AFLURIA/FLUZONE)     CBC with " Platelets           Risks and benefits of prescribed medications discussed.  Medication instructions reviewed.  Contraception methods discussed  Discussed calcium intake, vitamins and supplements including Vitamin D  Exercise encouraged  Follow up in 1 year, or sooner for IUD insertion when she feels ready.  Physical therapy referral given   BINH Cummins CNM

## 2021-10-27 ASSESSMENT — ANXIETY QUESTIONNAIRES: GAD7 TOTAL SCORE: 5

## 2021-11-23 ENCOUNTER — E-VISIT (OUTPATIENT)
Dept: FAMILY MEDICINE | Facility: CLINIC | Age: 36
End: 2021-11-23
Payer: COMMERCIAL

## 2021-11-23 ENCOUNTER — TELEPHONE (OUTPATIENT)
Dept: OBGYN | Facility: CLINIC | Age: 36
End: 2021-11-23
Payer: COMMERCIAL

## 2021-11-23 DIAGNOSIS — J01.90 ACUTE SINUSITIS WITH SYMPTOMS > 10 DAYS: Primary | ICD-10-CM

## 2021-11-23 PROCEDURE — 99421 OL DIG E/M SVC 5-10 MIN: CPT | Performed by: PHYSICIAN ASSISTANT

## 2021-11-23 NOTE — TELEPHONE ENCOUNTER
----- Message from Matilde Mitchell RN sent at 11/23/2021 10:55 AM CST -----  Regarding: URI meds in breastfeeding  10 weeks PP, CNM patient.  Both kids pos RSV in past few weeks.  Now she has had URI x 2 weeks, headaches, nasal congestion, losing voice.  Now maybe sinus infection.  Can she take anything while breastfeeding?

## 2021-11-23 NOTE — PATIENT INSTRUCTIONS
You may want to try a nasal lavage (also known as nasal irrigation). You can find over-the-counter products, such as Neti-Pot, at retail locations or make your own at home. Instructions for homemade nasal lavage and more information on the process are available online at http://www.aafp.org/[email or web address]/1115/[email or web address].    Dear Sherita Glasgow    After reviewing your responses, I've been able to diagnose you with?a sinus infection caused by bacteria.?     Based on your responses and diagnosis, I have prescribed Augmentin to treat your symptoms which is safe while breastfeeding. I have sent this to your pharmacy.?     It is also important to stay well hydrated, get lots of rest and take over-the-counter decongestants,?tylenol?or ibuprofen if you?are able to?take those medications per your primary care provider to help relieve discomfort.?     It is important that you take?all of?your prescribed medication even if your symptoms are improving after a few doses.? Taking?all of?your medicine helps prevent the symptoms from returning.?     If your symptoms worsen, you develop severe headache, vomiting, high fever (>102), or are not improving in 7 days, please contact your primary care provider for an appointment or visit any of our convenient Walk-in Care or Urgent Care Centers to be seen which can be found on our website?here.?     Thanks again for choosing?us?as your health care partner,?   ?  Rosaura Miller PA-C?

## 2021-11-23 NOTE — TELEPHONE ENCOUNTER
States that both children were tested for covid and were negative.  She has not been tested.    Encouraged eval through [email or web address], and likely covid testing given her symptoms    Reviewed OTC symptom management with breastfeeding

## 2022-01-28 ENCOUNTER — OFFICE VISIT (OUTPATIENT)
Dept: INTERNAL MEDICINE | Facility: CLINIC | Age: 37
End: 2022-01-28
Attending: INTERNAL MEDICINE
Payer: COMMERCIAL

## 2022-01-28 VITALS
DIASTOLIC BLOOD PRESSURE: 66 MMHG | SYSTOLIC BLOOD PRESSURE: 117 MMHG | BODY MASS INDEX: 23.63 KG/M2 | HEART RATE: 76 BPM | WEIGHT: 142 LBS

## 2022-01-28 DIAGNOSIS — S16.1XXS STRAIN OF NECK MUSCLE, SEQUELA: ICD-10-CM

## 2022-01-28 DIAGNOSIS — G43.009 MIGRAINE WITHOUT AURA AND WITHOUT STATUS MIGRAINOSUS, NOT INTRACTABLE: Primary | ICD-10-CM

## 2022-01-28 DIAGNOSIS — F41.1 GENERALIZED ANXIETY DISORDER: ICD-10-CM

## 2022-01-28 PROCEDURE — 99215 OFFICE O/P EST HI 40 MIN: CPT | Performed by: INTERNAL MEDICINE

## 2022-01-28 PROCEDURE — G0463 HOSPITAL OUTPT CLINIC VISIT: HCPCS

## 2022-01-28 RX ORDER — CITALOPRAM HYDROBROMIDE 10 MG/1
10 TABLET ORAL DAILY
Qty: 30 TABLET | Refills: 4 | Status: SHIPPED | OUTPATIENT
Start: 2022-01-28 | End: 2022-03-01

## 2022-01-28 RX ORDER — VITAMIN B COMPLEX
100 TABLET ORAL DAILY
Qty: 90 CAPSULE | Refills: 3 | Status: SHIPPED | OUTPATIENT
Start: 2022-01-28 | End: 2023-02-13

## 2022-01-28 ASSESSMENT — ANXIETY QUESTIONNAIRES
GAD7 TOTAL SCORE: 16
1. FEELING NERVOUS, ANXIOUS, OR ON EDGE: NEARLY EVERY DAY
7. FEELING AFRAID AS IF SOMETHING AWFUL MIGHT HAPPEN: MORE THAN HALF THE DAYS
5. BEING SO RESTLESS THAT IT IS HARD TO SIT STILL: SEVERAL DAYS
2. NOT BEING ABLE TO STOP OR CONTROL WORRYING: MORE THAN HALF THE DAYS
6. BECOMING EASILY ANNOYED OR IRRITABLE: NEARLY EVERY DAY
3. WORRYING TOO MUCH ABOUT DIFFERENT THINGS: MORE THAN HALF THE DAYS

## 2022-01-28 ASSESSMENT — PATIENT HEALTH QUESTIONNAIRE - PHQ9
5. POOR APPETITE OR OVEREATING: NEARLY EVERY DAY
SUM OF ALL RESPONSES TO PHQ QUESTIONS 1-9: 11

## 2022-01-28 ASSESSMENT — PAIN SCALES - GENERAL: PAINLEVEL: MILD PAIN (2)

## 2022-01-28 NOTE — LETTER
1/28/2022       RE: Sherita Glasgow  [address]  [facility] [address]     Dear Colleague,    Thank you for referring your patient, Sherita Glasgow, to the [facility] at [facility]. Please see a copy of my visit note below.    Chief Complaint   Patient presents with     Follow Up     C/O migraines   Neida Culp LPN      Assessment & Plan     Migraine without aura and without status migrainosus, not intractable  Discussed management of migraines with patient. She was advised on use of supplements, such as vitamin B2, coenzyme Q 10, as well as magnesium for prevention of headaches. Will continue with use of current triptan for management of acute episodes. Also reviewed role of stress, sleep deprivation, and neck muscle strain in migraine. Referral to neurology was placed today.  - Adult Neurology  Referral; Future  - riboflavin 400 MG CAPS; Take 400 mg by mouth daily  - Coenzyme Q10 (COQ10) 100 MG CAPS; Take 1 capsule (100 mg) by mouth daily  - magnesium oxide (MAG-OX) 400 (241.3 Mg) MG tablet; Take 1 tablet (400 mg) by mouth daily    Strain of neck muscle, sequela  Discussed management of muscle strain. Patient was referred to physical therapy.  - Physical Therapy Referral; Future    Generalized anxiety disorder  Extensive conversation regarding management of anxiety disorder. Given current scores on PHQ-9 as well as VESNA-7, recommend starting antidepressant therapy. Reviewed side effects of citalopram as well as expected timing of response. Recommend follow-up in 2 to 3weeks to discuss side effects and possible titration of therapy.  - citalopram (CELEXA) 10 MG tablet; Take 1 tablet (10 mg) by mouth daily      I spent a total of 40 minutes on the day of the visit.   Time spent doing chart review, history and exam, documentation and further activities per the note       Depression Screening Follow Up    PHQ  1/28/2022   PHQ-9 Total Score 11   Q9: Thoughts of better off dead/self-harm past 2 weeks Not at all         Follow Up Actions Taken  Crisis resource information provided in After Visit Summary  Patient counseled, no additional follow up at this time.     MEDICATIONS:   Orders Placed This Encounter   Medications     riboflavin 400 MG CAPS     Sig: Take 400 mg by mouth daily     Dispense:  90 capsule     Refill:  3     Coenzyme Q10 (COQ10) 100 MG CAPS     Sig: Take 1 capsule (100 mg) by mouth daily     Dispense:  90 capsule     Refill:  3     magnesium oxide (MAG-OX) 400 (241.3 Mg) MG tablet     Sig: Take 1 tablet (400 mg) by mouth daily     Dispense:  90 tablet     Refill:  3     citalopram (CELEXA) 10 MG tablet     Sig: Take 1 tablet (10 mg) by mouth daily     Dispense:  30 tablet     Refill:  4          - Continue other medications without change    Return in about 2 weeks (around 2/11/2022).    Brittney Godoy MD  [facility] DARNELL Magallon is a 36 year old who presents for the following health issues     HPI     Patient reports that she has been dealing with a long history of migraines. She reports that both of her parents have migraines. She has been on Maxalt and other triptans. She also has tried ibuprofen with some relief. However, she has not been on a preventive medication consistently. She has tried gluten-free diet which has helped. She has tried magnesium supplements in the past.   Patient reports that she has noticed increased frequency of migraines. She is under a lot of stress.       VESNA-7 SCORE 3/8/2021 8/13/2021 10/26/2021   Total Score 4 (minimal anxiety) - -   Total Score 4 6 5     PHQ 9/1/2020/1/2020 2/8/2021 8/13/2021   PHQ-9 Total Score 4 4 -   Q9: Thoughts of better off dead/self-harm past 2 weeks Not at all Not at all Not at all         Review of Systems   Constitutional, HEENT, cardiovascular, pulmonary, GI, , musculoskeletal, neuro, skin,  endocrine and psych systems are negative, except as otherwise noted.      Objective    /66   Pulse 76   Wt 64.4 kg (142 lb)   Breastfeeding Yes   BMI 23.63 kg/m    Body mass index is 23.63 kg/m .  Physical Exam   GENERAL: healthy, alert and no distress  EYES: Eyes grossly normal to inspection, PERRL and conjunctivae and sclerae normal  RESP: noraml effort, no wheezing  CV: regular rates and rhythm and no peripheral edema  MS: no gross musculoskeletal defects noted, no edema  SKIN: no suspicious lesions or rashes  NEURO: Normal strength and tone, mentation intact and speech normal  PSYCH: mentation appears normal, affect normal/bright

## 2022-01-28 NOTE — PROGRESS NOTES
Assessment & Plan     Migraine without aura and without status migrainosus, not intractable  Discussed management of migraines with patient. She was advised on use of supplements, such as vitamin B2, coenzyme Q 10, as well as magnesium for prevention of headaches. Will continue with use of current triptan for management of acute episodes. Also reviewed role of stress, sleep deprivation, and neck muscle strain in migraine. Referral to neurology was placed today.  - Adult Neurology  Referral; Future  - riboflavin 400 MG CAPS; Take 400 mg by mouth daily  - Coenzyme Q10 (COQ10) 100 MG CAPS; Take 1 capsule (100 mg) by mouth daily  - magnesium oxide (MAG-OX) 400 (241.3 Mg) MG tablet; Take 1 tablet (400 mg) by mouth daily    Strain of neck muscle, sequela  Discussed management of muscle strain. Patient was referred to physical therapy.  - Physical Therapy Referral; Future    Generalized anxiety disorder  Extensive conversation regarding management of anxiety disorder. Given current scores on PHQ-9 as well as VESNA-7, recommend starting antidepressant therapy. Reviewed side effects of citalopram as well as expected timing of response. Recommend follow-up in 2 to 3weeks to discuss side effects and possible titration of therapy.  - citalopram (CELEXA) 10 MG tablet; Take 1 tablet (10 mg) by mouth daily      I spent a total of 40 minutes on the day of the visit.   Time spent doing chart review, history and exam, documentation and further activities per the note       Depression Screening Follow Up    PHQ 1/28/2022   PHQ-9 Total Score 11   Q9: Thoughts of better off dead/self-harm past 2 weeks Not at all         Follow Up Actions Taken  Crisis resource information provided in After Visit Summary  Patient counseled, no additional follow up at this time.     MEDICATIONS:   Orders Placed This Encounter   Medications     riboflavin 400 MG CAPS     Sig: Take 400 mg by mouth daily     Dispense:  90 capsule     Refill:  3      Coenzyme Q10 (COQ10) 100 MG CAPS     Sig: Take 1 capsule (100 mg) by mouth daily     Dispense:  90 capsule     Refill:  3     magnesium oxide (MAG-OX) 400 (241.3 Mg) MG tablet     Sig: Take 1 tablet (400 mg) by mouth daily     Dispense:  90 tablet     Refill:  3     citalopram (CELEXA) 10 MG tablet     Sig: Take 1 tablet (10 mg) by mouth daily     Dispense:  30 tablet     Refill:  4          - Continue other medications without change    Return in about 2 weeks (around 2/11/2022).    Brittney Godoy MD  [facility]    Guillermo Magallon is a 36 year old who presents for the following health issues     HPI     Patient reports that she has been dealing with a long history of migraines. She reports that both of her parents have migraines. She has been on Maxalt and other triptans. She also has tried ibuprofen with some relief. However, she has not been on a preventive medication consistently. She has tried gluten-free diet which has helped. She has tried magnesium supplements in the past.   Patient reports that she has noticed increased frequency of migraines. She is under a lot of stress.       VESNA-7 SCORE 3/8/2021 8/13/2021 10/26/2021   Total Score 4 (minimal anxiety) - -   Total Score 4 6 5     PHQ 9/1/2020 2/8/2021 8/13/2021   PHQ-9 Total Score 4 4 -   Q9: Thoughts of better off dead/self-harm past 2 weeks Not at all Not at all Not at all         Review of Systems   Constitutional, HEENT, cardiovascular, pulmonary, GI, , musculoskeletal, neuro, skin, endocrine and psych systems are negative, except as otherwise noted.      Objective    /66   Pulse 76   Wt 64.4 kg (142 lb)   Breastfeeding Yes   BMI 23.63 kg/m    Body mass index is 23.63 kg/m .  Physical Exam   GENERAL: healthy, alert and no distress  EYES: Eyes grossly normal to inspection, PERRL and conjunctivae and sclerae normal  RESP: noraml effort, no wheezing  CV: regular rates and rhythm and no peripheral  edema  MS: no gross musculoskeletal defects noted, no edema  SKIN: no suspicious lesions or rashes  NEURO: Normal strength and tone, mentation intact and speech normal  PSYCH: mentation appears normal, affect normal/bright

## 2022-01-29 ASSESSMENT — ANXIETY QUESTIONNAIRES: GAD7 TOTAL SCORE: 16

## 2022-02-08 ENCOUNTER — THERAPY VISIT (OUTPATIENT)
Dept: PHYSICAL THERAPY | Facility: CLINIC | Age: 37
End: 2022-02-08
Attending: INTERNAL MEDICINE
Payer: COMMERCIAL

## 2022-02-08 DIAGNOSIS — S16.1XXS STRAIN OF NECK MUSCLE, SEQUELA: ICD-10-CM

## 2022-02-08 PROCEDURE — 97530 THERAPEUTIC ACTIVITIES: CPT | Mod: GP | Performed by: PHYSICAL THERAPIST

## 2022-02-08 PROCEDURE — 97161 PT EVAL LOW COMPLEX 20 MIN: CPT | Mod: GP | Performed by: PHYSICAL THERAPIST

## 2022-02-08 PROCEDURE — 97140 MANUAL THERAPY 1/> REGIONS: CPT | Mod: GP | Performed by: PHYSICAL THERAPIST

## 2022-02-08 NOTE — PROGRESS NOTES
Physical Therapy Initial Evaluation  Subjective:  The history is provided by the patient. No  was used.   Therapist Generated HPI Evaluation  Problem details: DOI: January 2011 of insidous nature, unknown reasons.         Type of problem:  Cervical spine.    This is a chronic condition.      Patient reports pain:  Cervical left side and cervical right side (3/10).  Pain is described as shooting, aching and cramping and is intermittent.  Radiates to: Bilatera upper back, right greater than left posterior occiput. Pain timing: none.  Since onset symptoms are gradually improving.  Associated symptoms:  Headache. Symptoms are exacerbated by lifting  and relieved by rest (medication, ice).  Imaging testing: none.  Previous treatment includes chiropractic. Improved with treatment: minimal   Work activity restrictions: not presently working   Barriers include:  None as reported by patient.                        Objective:  System              Cervical/Thoracic Evaluation    AROM:  AROM Cervical:    Flexion:            90%  Extension:       WNL   Rotation:         Left: 80%     Right: 85% with pain   Side Bend:      Left: 25%     Right:  25%      Headaches: cervical and migraine  Cervical Myotomes:  not assessed                  DTR's:  not assessed          Cervical Dermatomes:  not assessed                    Cervical Palpation:  : moderate muscle tightness right UT, left greater than right sternocleidomastoid muscle, right levator scapula muscle.      Functional Tests:  not assessed    Cervical Stability/Joint Clearing:  not assessed        Cord Sign:  not assessed                                        moderate upper cervical hypomobility with OA greater than AA restriction, mild decrease in mid to lower cervical joint hypomobility and moderate upper to mid thoracic hypomobility, sits with fair posture with increased thoracic kyphosis and forward head posture. Right elevated scapula, passive  shoulder er (L) limited to 75 degrees WNL (R), Passive shoulder IR wnl (B), passive shoulder flexion: 178 (B)    General     ROS    Assessment/Plan:    Patient is a 36 year old female with cervical complaints.    Patient has the following significant findings with corresponding treatment plan.                Diagnosis 1:  Strain of neck muscle  Pain -  hot/cold therapy, manual therapy, self management, education, directional preference exercise and home program  Decreased ROM/flexibility - manual therapy, therapeutic exercise, therapeutic activity and home program  Decreased joint mobility - manual therapy, therapeutic exercise, therapeutic activity and home program  Decreased strength - therapeutic exercise, therapeutic activities and home program  Impaired muscle performance - neuro re-education and home program  Decreased function - therapeutic activities and home program    Therapy Evaluation Codes:   1) Clinical presentation characteristics are:   Stable/Uncomplicated.  2) Decision-Making    Low complexity using standardized patient assessment instrument and/or measureable assessment of functional outcome.  Cumulative Therapy Evaluation is: Low complexity.    Previous and current functional limitations:  (See Goal Flow Sheet for this information)    Short term and Long term goals: (See Goal Flow Sheet for this information)     Communication ability:  Patient appears to be able to clearly communicate and understand verbal and written communication and follow directions correctly.  Treatment Explanation - The following has been discussed with the patient:   RX ordered/plan of care  Anticipated outcomes  Possible risks and side effects  This patient would benefit from PT intervention to resume normal activities.   Rehab potential is good.    Frequency:  1 X week, once daily  Duration:  for 6 weeks  Discharge Plan:  Achieve all LTG.  Independent in home treatment program.  Reach maximal therapeutic  benefit.    Please refer to the daily flowsheet for treatment today, total treatment time and time spent performing 1:1 timed codes.

## 2022-02-09 NOTE — PROGRESS NOTES
Physical Therapy Initial Evaluation  Subjective:    Patient Health History         Pain is reported as 3/10 on pain scale.  General health as reported by patient is good.  Pertinent medical history includes: migraines/headaches.   Red flags:  None as reported by patient.  Medical allergies: none.   Surgeries include:  Orthopedic surgery and other. Other surgery history details: R ankle 2008, Hernia 1994.    Current medications:  Anti-depressants. Other medications details: Migraine medication.    Current occupation is on maternity leave, in between jobs.   Primary job tasks include:  Computer work, lifting/carrying and prolonged standing.   Other job/home tasks details: carrying and nursing 18 pound baby.                                  Objective:  System    Physical Exam    General     ROS    Assessment/Plan:

## 2022-02-24 ENCOUNTER — TELEPHONE (OUTPATIENT)
Dept: INTERNAL MEDICINE | Facility: CLINIC | Age: 37
End: 2022-02-24
Payer: COMMERCIAL

## 2022-02-24 NOTE — TELEPHONE ENCOUNTER
[facility] Call Center    Phone Message    May a detailed message be left on voicemail: yes     Reason for Call: Patient needed to scheduled a follow up with Dr. Godoy in 2-3 weeks (around 2/28) but writer not able to locate appt until April. Also patient is wondering if it can just be a telephone call, Instead of in person. Please reach out to patient if anything can be scheduled. Thank you    Action Taken: Other: [facility]    Travel Screening: Not Applicable

## 2022-02-28 NOTE — TELEPHONE ENCOUNTER
The patient called again regarding schedule a virtual follow up visit. Writer tried scheduling next available, 4/5/22 but patient states is supposed to follow up this week per Dr. Godoy. Please advise. Thank you.

## 2022-03-01 NOTE — TELEPHONE ENCOUNTER
Called patient and offered telephone visit with Dr. Godoy 3/1 at 0930, 1100, or 1130. Instructed patient to call back to confirm.

## 2022-03-04 ENCOUNTER — TELEPHONE (OUTPATIENT)
Dept: INTERNAL MEDICINE | Facility: CLINIC | Age: 37
End: 2022-03-04
Payer: COMMERCIAL

## 2022-03-04 DIAGNOSIS — G43.009 MIGRAINE WITHOUT AURA AND WITHOUT STATUS MIGRAINOSUS, NOT INTRACTABLE: ICD-10-CM

## 2022-03-05 NOTE — TELEPHONE ENCOUNTER
PA Initiation    Medication: rizatriptan (MAXALT) 10 MG tablet   Insurance Company: [employer] - Phone [phone] Fax [phone]  Pharmacy Filling the Rx: [id number] [place] [id number] AT [facility] [address]  Filling Pharmacy Phone: [phone]  Filling Pharmacy Fax: [phone]  Start Date: 3/4/2022

## 2022-03-07 NOTE — TELEPHONE ENCOUNTER
PRIOR AUTHORIZATION DENIED    Medication: rizatriptan (MAXALT) 10 MG tablet--DENIED    Denial Date: 3/5/2022    Denial Rational: Quantity is not FDA approved.  Plan covers up to 18 tablets per month    Appeal Information:

## 2022-03-14 RX ORDER — RIZATRIPTAN BENZOATE 10 MG/1
TABLET ORAL
Qty: 18 TABLET | Refills: 3 | Status: SHIPPED | OUTPATIENT
Start: 2022-03-14 | End: 2022-05-23

## 2022-03-15 NOTE — TELEPHONE ENCOUNTER
Rx rewritten by Dr. Godoy. [employer] message sent to patient informing her this was done.

## 2022-03-26 ENCOUNTER — HEALTH MAINTENANCE LETTER (OUTPATIENT)
Age: 37
End: 2022-03-26

## 2022-05-19 NOTE — TELEPHONE ENCOUNTER
FUTURE VISIT INFORMATION      FUTURE VISIT INFORMATION:    Date: 5/23/2022    Time: 1010am    [place] [facility] [place]  REFERRAL INFORMATION:    Referring provider:  Dr. Godoy     Referring providers clinic:      Reason for visit/diagnosis  Headaches     RECORDS REQUESTED FROM:       Clinic name Comments Records Status Imaging Status   Internal LEIGHANN Razo-1/29/2018    Dr. Godoy-1/28/2022, 3/1/2022 Epic No Images

## 2022-05-23 ENCOUNTER — OFFICE VISIT (OUTPATIENT)
Dept: NEUROLOGY | Facility: CLINIC | Age: 37
End: 2022-05-23
Attending: INTERNAL MEDICINE
Payer: COMMERCIAL

## 2022-05-23 ENCOUNTER — PRE VISIT (OUTPATIENT)
Dept: NEUROLOGY | Facility: CLINIC | Age: 37
End: 2022-05-23

## 2022-05-23 VITALS
OXYGEN SATURATION: 98 % | DIASTOLIC BLOOD PRESSURE: 81 MMHG | RESPIRATION RATE: 16 BRPM | WEIGHT: 140 LBS | HEART RATE: 56 BPM | BODY MASS INDEX: 23.3 KG/M2 | SYSTOLIC BLOOD PRESSURE: 120 MMHG

## 2022-05-23 DIAGNOSIS — G43.709 CHRONIC MIGRAINE WITHOUT AURA WITHOUT STATUS MIGRAINOSUS, NOT INTRACTABLE: Primary | ICD-10-CM

## 2022-05-23 DIAGNOSIS — G43.009 MIGRAINE WITHOUT AURA AND WITHOUT STATUS MIGRAINOSUS, NOT INTRACTABLE: ICD-10-CM

## 2022-05-23 PROCEDURE — 99204 OFFICE O/P NEW MOD 45 MIN: CPT | Performed by: NURSE PRACTITIONER

## 2022-05-23 RX ORDER — RIZATRIPTAN BENZOATE 10 MG/1
10 TABLET, ORALLY DISINTEGRATING ORAL
Qty: 18 TABLET | Refills: 6 | Status: SHIPPED | OUTPATIENT
Start: 2022-05-23 | End: 2022-08-30

## 2022-05-23 ASSESSMENT — PAIN SCALES - GENERAL: PAINLEVEL: MILD PAIN (3)

## 2022-05-23 NOTE — LETTER
5/23/2022       RE: Sherita Glasgow  [address]  [facility] [address]     Dear Colleague,    Thank you for referring your patient, Sherita Glasgow, to the [facility] [place] at [facility]. Please see a copy of my visit note below.    ASSESSMENT AND PLAN:  Headaches and reported headache symptoms appear to be chronic migraine in phenotype. Possible genetic in etiology. Imaging -will wait unless changes in the symptoms.  Treatment options reviewed-traditional, CGRPs and Botox. Would avoid BBB due to history of depression, would avoid CCB -bradycardia and topiramate -no relievable birth control   Plan:  Headache prevention  Recommended a trial of migraine preventive treatment with Emgality. Side effects-allergic reaction or pain in the injection side or redness in the injection side. Unknown side effects with a long term use.   Rescue treatment -rizatriptan as needed and a trial of Nurtec as needed instead of rizatriptan or the same day when rizatriptan does not work or start with Nurtec initially   Follow up in 3 months after starting Emgality or sooner if needed     Subjective   Sherita is a 36 year old who presents for the following health issues -headache   HPI   Headaches for 20 years. In the last year headaches worse -5-6/week and more 15 per month in the last year and duration more than 4 hours   Second pregnancy and post partum -headaches worse and just stopped nursing and headaches a little bit better. Headaches are effecting daily life and ready to manage headaches.   Took rizatriptan once in a few weeks since stopping breastfeeding  Started citalopram in Jan 2022 and mood is better.   Loc-in the right eyeball, right neck tightness and less frequently left and pain is pounding  Associated -occasional nausea and vomiting, sensitivity to light and sound,   In the last year when headache is severe -rizatriptan and in a few hours  headache is relieved but headaches more around menstrual cycle.     Other headache treatment   Rizatriptan-works about 50% of time , naratriptan, sumatriptan citalopram     Considering IUD but not not sure yet but no pregnancy planned.     FH-both parents -migraine headaches. Mother had menstrual migraine headaches    SH-3.5 years and 3 months.   Worked in tech and [employer]      Objective    /81   Pulse 56   Resp 16   Wt 63.5 kg (140 lb)   SpO2 98%   BMI 23.30 kg/m    Body mass index is 23.3 kg/m .  Physical Exam   GENERAL: healthy, alert and no distress  EYES: Eyes grossly normal to inspection, PERRL and conjunctivae and sclerae normal  NECK: no adenopathy, no asymmetry, masses, or scars and thyroid normal to palpation  RESP: lungs clear to auscultation - no rales, rhonchi or wheezes  CV: regular rate and rhythm,no murmur, click or rub  MS: no gross musculoskeletal defects noted, no edema  NEURO: Normal strength and tone, mentation intact, speech normal, cranial nerves 2-12 intact, gait normal including heel/toe/tandem walking and Romberg normal  PSYCH: mentation appears normal, affect normal/bright    I discussed all my recommendations with Sherita Glasgow who verbalizes understanding and comfortable with the plan.  All of patient's questions were answered from the best of my knowledge.  Patient is in agreement with the plan.     52 minutes spent on the date of the encounter doing face to face, chart  review, exam, results review,  meds review, treatment plan, documentation and further activities as noted above      BINH Hanson, CNP [facility]  Headache certified  [facility]

## 2022-05-23 NOTE — PROGRESS NOTES
ASSESSMENT AND PLAN:  Headaches and reported headache symptoms appear to be chronic migraine in phenotype. Possible genetic in etiology. Imaging -will wait unless changes in the symptoms.  Treatment options reviewed-traditional, CGRPs and Botox. Would avoid BBB due to history of depression, would avoid CCB -bradycardia and topiramate -no relievable birth control   Plan:  Headache prevention  Recommended a trial of migraine preventive treatment with Emgality. Side effects-allergic reaction or pain in the injection side or redness in the injection side. Unknown side effects with a long term use.   Rescue treatment -rizatriptan as needed and a trial of Nurtec as needed instead of rizatriptan or the same day when rizatriptan does not work or start with Nurtec initially   Follow up in 3 months after starting Emgality or sooner if needed     Subjective   Sherita is a 36 year old who presents for the following health issues -headache   HPI   Headaches for 20 years. In the last year headaches worse -5-6/week and more 15 per month in the last year and duration more than 4 hours   Second pregnancy and post partum -headaches worse and just stopped nursing and headaches a little bit better. Headaches are effecting daily life and ready to manage headaches.   Took rizatriptan once in a few weeks since stopping breastfeeding  Started citalopram in Jan 2022 and mood is better.   Loc-in the right eyeball, right neck tightness and less frequently left and pain is pounding  Associated -occasional nausea and vomiting, sensitivity to light and sound,   In the last year when headache is severe -rizatriptan and in a few hours headache is relieved but headaches more around menstrual cycle.     Other headache treatment   Rizatriptan-works about 50% of time , naratriptan, sumatriptan citalopram     Considering IUD but not not sure yet but no pregnancy planned.     FH-both parents -migraine headaches. Mother had menstrual migraine  headaches    SH-3.5 years and 3 months.   Worked in tech and sustainability      Objective    /81   Pulse 56   Resp 16   Wt 63.5 kg (140 lb)   SpO2 98%   BMI 23.30 kg/m    Body mass index is 23.3 kg/m .  Physical Exam   GENERAL: healthy, alert and no distress  EYES: Eyes grossly normal to inspection, PERRL and conjunctivae and sclerae normal  NECK: no adenopathy, no asymmetry, masses, or scars and thyroid normal to palpation  RESP: lungs clear to auscultation - no rales, rhonchi or wheezes  CV: regular rate and rhythm,no murmur, click or rub  MS: no gross musculoskeletal defects noted, no edema  NEURO: Normal strength and tone, mentation intact, speech normal, cranial nerves 2-12 intact, gait normal including heel/toe/tandem walking and Romberg normal  PSYCH: mentation appears normal, affect normal/bright    I discussed all my recommendations with Sherita Glasgow who verbalizes understanding and comfortable with the plan.  All of patient's questions were answered from the best of my knowledge.  Patient is in agreement with the plan.     52 minutes spent on the date of the encounter doing face to face, chart  review, exam, results review,  meds review, treatment plan, documentation and further activities as noted above    BINH Hanson, CNP [facility]  Headache certified  [facility]

## 2022-05-23 NOTE — PATIENT INSTRUCTIONS
Plan:  Headache prevention  Recommended a trial of migraine preventive treatment with Emgality. Side effects-allergic reaction or pain in the injection side or redness in the injection side. Unknown side effects with a long term use.   Rescue treatment -rizatriptan as needed and a trial of Nurtec as needed instead of rizatriptan or the same day when rizatriptan does not work or start with Nurtec initially   Follow up in 3 months after starting Emgality or sooner if needed

## 2022-05-24 ENCOUNTER — TELEPHONE (OUTPATIENT)
Dept: NEUROLOGY | Facility: CLINIC | Age: 37
End: 2022-05-24
Payer: COMMERCIAL

## 2022-05-24 NOTE — TELEPHONE ENCOUNTER
Prior Authorization Retail Medication Request    Medication/Dose: Nurtec 75 mg  ICD code (if different than what is on RX): Chronic migraine  Previously Tried and Failed:  Would avoid BBB due to history of depression, would avoid CCB -bradycardia and topiramate -no relievable birth control   Rizatriptan-works about 50% of time , naratriptan, sumatriptan citalopram      Rationale:   last year headaches worse -5-6/week and more 15 per month in the last year and duration more than 4 hours      Insurance Name:  [facility]  Insurance ID:  [id number]      Pharmacy Information (if different than what is on RX)  Name:    Phone:

## 2022-05-24 NOTE — TELEPHONE ENCOUNTER
Prior Authorization Retail Medication Request    Medication/Dose: Emgality 240 mg loading dose then 120 mg every 28 days  ICD code (if different than what is on RX):  Chronic migraine  Previously Tried and Failed:  Would avoid BBB due to history of depression, would avoid CCB -bradycardia and topiramate -no relievable birth control   Rizatriptan-works about 50% of time , naratriptan, sumatriptan citalopram      Rationale:   last year headaches worse -5-6/week and more 15 per month in the last year and duration more than 4 hours     Insurance Name:  [facility]  Insurance ID:  [id number]       Pharmacy Information (if different than what is on RX)  Name:    Phone:

## 2022-05-24 NOTE — TELEPHONE ENCOUNTER
Central Prior Authorization Team   Phone: [phone]      PA Initiation    Medication: Emgality 240 mg loading dose then 120 mg every 28 days  Insurance Company: [employer] - Phone [phone] Fax [phone]  Pharmacy Filling the Rx: [facility] #[id number] [place] [phone] AT [facility] OF [address]  Filling Pharmacy Phone: [phone]  Filling Pharmacy Fax:    Start Date: 5/24/2022

## 2022-05-24 NOTE — TELEPHONE ENCOUNTER
Central Prior Authorization Team   Phone: [phone]      PA Initiation    Medication: Nurtec 75 mg  Insurance Company: [employer] - Phone [phone] Fax [phone]  Pharmacy Filling the Rx: [facility] #[id number] [place] [id number] AT [facility] OF [address]  Filling Pharmacy Phone: [phone]  Filling Pharmacy Fax:    Start Date: 5/24/2022

## 2022-05-25 NOTE — TELEPHONE ENCOUNTER
Prior Authorization Approval    Authorization Effective Date: 5/25/2022  Authorization Expiration Date: 11/25/2022  Medication: Emgality 240 mg -APPROVED  Approved Dose/Quantity:   Reference #:     Insurance Company: [employer] - Phone [phone] Fax [phone]  Expected CoPay:       CoPay Card Available:      Foundation Assistance Needed:    Which Pharmacy is filling the prescription (Not needed for infusion/clinic administered): [facility] #[id number] [place] [id number] [address] AT [facility] OF [address]  Pharmacy Notified: Yes-Pharmacy will notify patient when ready.  Patient Notified: No

## 2022-05-25 NOTE — TELEPHONE ENCOUNTER
Prior Authorization Approval    Authorization Effective Date: 5/25/2022  Authorization Expiration Date: 5/25/2023  Medication: Nurtec 75 mg-APPROVED  Approved Dose/Quantity:   Reference #:     Insurance Company: [employer] - Phone [phone] Fax [phone]  Expected CoPay:       CoPay Card Available:      Foundation Assistance Needed:    Which Pharmacy is filling the prescription (Not needed for infusion/clinic administered): [facility] #[id number] [place] [id number] [address] AT [facility] OF [address]  Pharmacy Notified: Yes-Pharmacy will notify patient when ready.  Patient Notified: No

## 2022-06-16 ENCOUNTER — TELEPHONE (OUTPATIENT)
Dept: NEUROLOGY | Facility: CLINIC | Age: 37
End: 2022-06-16
Payer: COMMERCIAL

## 2022-06-16 NOTE — TELEPHONE ENCOUNTER
I called pt pharmacy to discuss Emgality PA. They said they have maintenance dose approved but need loading dose approval. From PA approval letter on 5/25 it looks like loading dose was approved. I will reach out to our PA team to look into this and update pharmacy with approval number.     Alisa KO

## 2022-06-16 NOTE — TELEPHONE ENCOUNTER
Pharmacy was having trouble processing Emgality as the loading dose what not going through insurance. I called insurance they were able to fix the problem. I called pharmacy back and they were able to get a paid claim. Patient will get a call or text depending on their preference when ready.

## 2022-06-22 DIAGNOSIS — F41.1 GENERALIZED ANXIETY DISORDER: ICD-10-CM

## 2022-06-22 NOTE — TELEPHONE ENCOUNTER
citalopram (CELEXA) 20 MG tablet   Take 1 tablet (20 mg) by mouth daily     Last Written Prescription Date:  3/1/22  Last Fill Quantity: 30,   # refills: 3  Last Office Visit : 3/1/22  Future Office visit:  none    Referred to provider for additional quantity. Per last visit 3/1/22, - Recommend increasing the dose of citalopram. Patient will follow up in 1 month. She is in agreement with the plan.   - citalopram (CELEXA) 20 MG tablet; Take 1 tablet (20 mg) by mouth daily. No future appointment. Please advise.

## 2022-06-23 RX ORDER — CITALOPRAM HYDROBROMIDE 20 MG/1
20 TABLET ORAL DAILY
Qty: 30 TABLET | Refills: 0 | Status: SHIPPED | OUTPATIENT
Start: 2022-06-23 | End: 2022-07-28

## 2022-06-28 ENCOUNTER — VIRTUAL VISIT (OUTPATIENT)
Dept: FAMILY MEDICINE | Facility: CLINIC | Age: 37
End: 2022-06-28
Payer: COMMERCIAL

## 2022-06-28 DIAGNOSIS — U07.1 INFECTION DUE TO 2019 NOVEL CORONAVIRUS: Primary | ICD-10-CM

## 2022-06-28 PROCEDURE — 99213 OFFICE O/P EST LOW 20 MIN: CPT | Mod: 95 | Performed by: NURSE PRACTITIONER

## 2022-06-28 NOTE — PROGRESS NOTES
Sherita is a 36 year old who is being evaluated via a billable video visit.      How would you like to obtain your AVS? MyChart  If the video visit is dropped, the invitation should be resent by: Send to e-mail at: [email or web address]  Will anyone else be joining your video visit? No        Assessment & Plan     (U07.1) Infection due to 2019 novel coronavirus  (primary encounter diagnosis)  Comment:   Plan: COVID-19 GetWell Loop Referral        Patient is stable for home plan. Does not qualify for treatment unfortunately.   Quarantine advised until 5 days through AND symptoms have improved. Then 5 days with strict masking in public with good handwashing.   Home cares discussed.   Warning signs of Covid reviewed- if any breathing difficulty to call RN nurseline.   Oximeter recommended. Report if sats < 95%  Return/call to clinic with any new or worsening symptoms, and as needed.                       Return in about 4 weeks (around 7/26/2022) for Physical Exam, re-check office visit.    BINH Singleton [facility] MADDENCLEMENTE Magallon is a 36 year old, presenting for the following health issues:  Covid Concern      HPI       COVID-19 Symptom Review  How many days ago did these symptoms start? 3     Are any of the following symptoms significant for you?    New or worsening difficulty breathing? No    Worsening cough? Yes, I am coughing up mucus.    Fever or chills? Yes, I felt feverish or had chills. Through yesterday. No fever.     Headache: YES    Sore throat: no    Chest pain: YES    Diarrhea: no    Body aches? YES    cough developed 2 days ago. Has some chest tightness.     Has LAZO walking up stairs.     Feels more tired with activity.       What treatments has patient tried? Guaifenesin (mucinex) and Acetaminophen   Does patient live in a nursing home, group home, or shelter? no  Does patient have a way to get food/medications during quarantined? Yes, I have a friend or family  member who can help me.    History of chronic migraines.   2 young children.   Post-partum depression. VESNA in  History.   Overweight- no.           Review of Systems   Constitutional, HEENT, cardiovascular, pulmonary, gi and gu systems are negative, except as otherwise noted.      Objective           Vitals:  No vitals were obtained today due to virtual visit.    Physical Exam   GENERAL: Healthy, alert and no distress  EYES: Eyes grossly normal to inspection.  No discharge or erythema, or obvious scleral/conjunctival abnormalities.  RESP: No audible wheeze, cough, or visible cyanosis.  No visible retractions or increased work of breathing.    SKIN: Visible skin clear. No significant rash, abnormal pigmentation or lesions.  NEURO: Cranial nerves grossly intact.  Mentation and speech appropriate for age.  PSYCH: Mentation appears normal, affect normal/anxious, judgement and insight intact, normal speech and appearance well-groomed. Tearful when discussing not qualifying for treatment                Video-Visit Details    Video Start Time: 1034    Type of service:  Video Visit    Video End Time:10:52 AM    Originating Location (pt. Location): Home    Distant Location (provider location):  [facility] [place]     Platform used for Video Visit: Secerno    Usha Tipton

## 2022-06-28 NOTE — PATIENT INSTRUCTIONS
"  Instructions for Patients  Your COVID-19 test was positive. This means you have the virus. Please follow the \"How can I take care of myself\" and \"How do I self-isolate?\" guidelines in these instructions.    What treatments are available?  Over-the-counter medicines may help with your symptoms such as runny or stuffy nose, cough, chills, and fever. Talk to your care team about your options.       Please check your oxygen level and report to our nurseline at [phone] if under 95%.    What are the symptoms of COVID-19?  Symptoms can include fever, cough, shortness of breath, chills, headache, muscle pain sore throat, fatigue, runny or stuffy nose, and loss of taste and smell. Other less common symptoms include nausea, vomiting, or diarrhea (watery stools).    Know when to call 911. Emergency warning signs include:    Trouble breathing or shortness of breath    Pain or pressure in the chest that doesn't go away    Feeling confused like you haven't felt before, or not being able to wake up    Bluish-colored lips or face    How can I take care of myself?  1. Get lots of rest. Drink extra fluids (unless a doctor has told you not to).  2. Take Tylenol (acetaminophen) for fever or pain. If you have liver or kidney problems, ask your family doctor if it's okay to take Tylenol   Adults:   650 mg (two 325 mg pills or tablets) every 4 to 6 hours, or...   1,000 mg (two 500 mg pills or tablets) every 8 hours as needed.  Note: Don't take more than 3,000 mg in one day. Acetaminophen is found in many medicines (both prescribed and over the counter). Read all labels to be sure you don't take too much.  For children, check the Tylenol bottle for the right dose. The dose is based on the child's age or weight.  3. Take over the counter medicines for your symptoms as needed. Talk to your pharmacist.  4. If you have other health problems (like cancer, heart failure, an organ transplant, or severe kidney disease): Call your " specialty clinic if you don't feel better in the next 2 days.    These guidelines are for isolating and quarantining before returning to work, school or .     For employers, schools and day cares: This is an official notice for this person s medical guidelines for returning in-person.     For health care sites: The CDC gives different isolation and quarantine guidelines for healthcare sites, please check with these sites before arriving.     How do I self-isolate?  You isolate when you have symptoms of COVID or a test shows you have COVID, even if you don t have symptoms.     If you DO have symptoms:  o Stay home and away from others  - For at least 5 days after your symptoms started, AND   - You are fever free for 24 hours (with no medicine that reduces fever), AND  - Your other symptoms are better.  o Wear a mask for 10 full days any time you are around others.    If you DON T have symptoms:  o Stay at home and away from others for at least 5 days after your positive test.  o Wear a mask for 10 full days any time you are around others.    How and when do I quarantine?  You quarantine when you may have been exposed to the virus and DON T have symptoms.     Stay home and away from others.     You must quarantine for 5 days after your last contact with a person who has COVID.  o Note: If you are fully vaccinated, you don t need to quarantine. You should still follow the steps below.     Wear a mask for 10 full days any time you re around others.    Get tested at least 5 days after you were exposed, even if you don t have symptoms.     If you start to have symptoms, isolate right away and get tested.    Where can I get more information?    [facility] COVID-19 Resource Hub: [email or web address]     CDC Quarantine & Isolation: https://www.cdc.gov/coronavirus/2019-ncov/your-health/quarantine-isolation.html     CDC - What to Do If You're Sick:  https://www.cdc.gov/coronavirus/2019-ncov/if-you-are-sick/index.html    [facility] clinical trials (COVID-19 research studies): clinicalaffairs.[facility]/umn-clinical-trials    [place] Department of Health COVID-19 Public Hotline: 1-100.821.3544  Coping with Life After COVID-19  Being in the hospital because of COVID-19 is scary. Going home can be scary, too. You may face changes to your life, the way you work or what you can eat. It s hard to adjust to change, and it s normal to feel afraid, frustrated or even angry. These feelings usually go away over time. If your feelings don t start to get better, it s called  adjustment disorder.      What signs should I look out for?  Adjustment disorder can happen to anyone in a time of stress. It makes it hard to cope with daily life. You may feel lonely or fight with loved ones, even if you re glad to be home. Watch for these signs:  Fear or worry  Hard time focusing  Sadness or anger  Trouble talking to family or friends  Feeling like you don t fit in or isolating yourself  Problems with sleep   Drinking alcohol or taking drugs to cope    What can I do?  You can help yourself get better. Feeling you have control helps you move forward. You may wonder if you ll be able to do things you did before. Be patient. Do your best to make the most of every day. Try to build relationships, be as active as you can, eat right and keep a sense of humor. Avoid smoking and drinking too much alcohol. Call your family doctor or clinic if you re not sure what to do. They can guide you to care or other services.    When should I get help?  Think about getting counseling if your sadness or frustration gets worse. Together with a trained counselor, you can talk about your problems adjusting to life after your hospital stay. You can come up with new ways to handle changes that give you more control. Your family doctor or care team can help you find a counselor.     Get help if  you re thinking about hurting yourself. If you need help right away, call 911 or go to the nearest emergency room. You can also try the Crisis Text Line.    Crisis Text Line: 204-664 (http://www.crisistextline.org)  The Crisis Text Line serves anyone, in any crisis. It gives free, 24/7 support. Here's how it works:  Text HOME to 623877 from anywhere in the [place], anytime, about any type of crisis.  A live, trained Crisis Counselor will text you back quickly.  The volunteer Crisis Counselor can help you move from a  hot moment  to a  cool moment.  They can also help you work out a safety plan.

## 2022-07-20 ENCOUNTER — OFFICE VISIT (OUTPATIENT)
Dept: OPHTHALMOLOGY | Facility: CLINIC | Age: 37
End: 2022-07-20
Payer: COMMERCIAL

## 2022-07-20 DIAGNOSIS — D31.41 IRIS NEVUS, RIGHT: ICD-10-CM

## 2022-07-20 DIAGNOSIS — H52.13 MYOPIA OF BOTH EYES: Primary | ICD-10-CM

## 2022-07-20 PROCEDURE — 92014 COMPRE OPH EXAM EST PT 1/>: CPT | Performed by: OPTOMETRIST

## 2022-07-20 PROCEDURE — 92310 CONTACT LENS FITTING OU: CPT | Performed by: OPTOMETRIST

## 2022-07-20 PROCEDURE — 92015 DETERMINE REFRACTIVE STATE: CPT | Performed by: OPTOMETRIST

## 2022-07-20 ASSESSMENT — REFRACTION_CURRENTRX
OD_SPHERE: -5.00
OS_BRAND: BIOFINITY
OS_BASECURVE: 8.6
OD_BRAND: BIOFINITY
OS_SPHERE: -4.00
OD_BASECURVE: 8.6
OS_DIAMETER: 14.0
OD_DIAMETER: 14.0

## 2022-07-20 ASSESSMENT — REFRACTION_MANIFEST
OD_SPHERE: -4.50
OD_AXIS: 005
OS_CYLINDER: +1.00
OS_SPHERE: -4.00
OS_AXIS: 170
OD_CYLINDER: +0.50

## 2022-07-20 ASSESSMENT — CONF VISUAL FIELD
OS_NORMAL: 1
OD_NORMAL: 1

## 2022-07-20 ASSESSMENT — TONOMETRY
OD_IOP_MMHG: 18
OS_IOP_MMHG: 14
IOP_METHOD: ICARE

## 2022-07-20 ASSESSMENT — CUP TO DISC RATIO
OS_RATIO: 0.3
OD_RATIO: 0.3

## 2022-07-20 ASSESSMENT — EXTERNAL EXAM - RIGHT EYE: OD_EXAM: NORMAL

## 2022-07-20 ASSESSMENT — VISUAL ACUITY
CORRECTION_TYPE: CONTACTS
OD_CC: 20/20
OS_CC: 20/20
METHOD: SNELLEN - LINEAR

## 2022-07-20 ASSESSMENT — EXTERNAL EXAM - LEFT EYE: OS_EXAM: NORMAL

## 2022-07-20 ASSESSMENT — SLIT LAMP EXAM - LIDS
COMMENTS: NORMAL
COMMENTS: NORMAL
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
## 2022-07-28 DIAGNOSIS — F41.1 GENERALIZED ANXIETY DISORDER: ICD-10-CM

## 2022-07-28 RX ORDER — CITALOPRAM HYDROBROMIDE 20 MG/1
TABLET ORAL
Qty: 30 TABLET | Refills: 0 | Status: SHIPPED | OUTPATIENT
Start: 2022-07-28 | End: 2022-08-30

## 2022-08-25 DIAGNOSIS — F41.1 GENERALIZED ANXIETY DISORDER: ICD-10-CM

## 2022-08-30 ENCOUNTER — TELEPHONE (OUTPATIENT)
Dept: NEUROLOGY | Facility: CLINIC | Age: 37
End: 2022-08-30

## 2022-08-30 ENCOUNTER — VIRTUAL VISIT (OUTPATIENT)
Dept: NEUROLOGY | Facility: CLINIC | Age: 37
End: 2022-08-30
Payer: COMMERCIAL

## 2022-08-30 DIAGNOSIS — G43.009 MIGRAINE WITHOUT AURA AND WITHOUT STATUS MIGRAINOSUS, NOT INTRACTABLE: ICD-10-CM

## 2022-08-30 PROCEDURE — 99214 OFFICE O/P EST MOD 30 MIN: CPT | Mod: 95 | Performed by: NURSE PRACTITIONER

## 2022-08-30 RX ORDER — SUMATRIPTAN 50 MG/1
50-100 TABLET, FILM COATED ORAL
Qty: 12 TABLET | Refills: 9 | Status: SHIPPED | OUTPATIENT
Start: 2022-08-30 | End: 2023-06-02 | Stop reason: ALTCHOICE

## 2022-08-30 ASSESSMENT — MIGRAINE DISABILITY ASSESSMENT (MIDAS)
ON A SCALE FROM 0-10 ON AVERAGE HOW PAINFUL WERE HEADACHES: 7
HOW MANY DAYS IN THE PAST 3 MONTHS HAVE YOU HAD A HEADACHE: 6
HOW MANY DAYS WAS YOUR PRODUCTIVITY CUT IN HALF BECAUSE OF HEADACHES: 5
HOW OFTEN WERE SOCIAL ACTIVITIES MISSED DUE TO HEADACHES: 3
HOW MANY DAYS WAS HOUSEWORK PRODUCTIVITY CUT IN HALF DUE TO HEADACHES: 6
HOW MANY DAYS DID YOU MISS WORK OR SCHOOL BECAUSE OF HEADACHES: 5
TOTAL SCORE: 25
HOW MANY DAYS DID YOU NOT DO HOUSEWORK BECAUSE OF HEADACHES: 6

## 2022-08-30 ASSESSMENT — HEADACHE IMPACT TEST (HIT 6)
HOW OFTEN DO HEADACHES LIMIT YOUR DAILY ACTIVITIES: SOMETIMES
HIT6 TOTAL SCORE: 60
HOW OFTEN HAVE YOU FELT FED UP OR IRRITATED BECAUSE OF YOUR HEADACHES: RARELY
WHEN YOU HAVE HEADACHES HOW OFTEN IS THE PAIN SEVERE: VERY OFTEN
HOW OFTEN HAVE YOU FELT FED UP OR IRRITATED BECAUSE OF YOUR HEADACHES: RARELY
HOW OFTEN HAVE YOU FELT TOO TIRED TO WORK BECAUSE OF YOUR HEADACHES: RARELY
HOW OFTEN HAVE YOU FELT TOO TIRED TO WORK BECAUSE OF YOUR HEADACHES: RARELY
HOW OFTEN DO HEADACHES LIMIT YOUR DAILY ACTIVITIES: SOMETIMES
WHEN YOU HAVE A HEADACHE HOW OFTEN DO YOU WISH YOU COULD LIE DOWN: ALWAYS
HIT6 TOTAL SCORE: 60
HOW OFTEN DID HEADACHS LIMIT CONCENTRATION ON WORK OR DAILY ACTIVITY: SOMETIMES
HOW OFTEN DID HEADACHS LIMIT CONCENTRATION ON WORK OR DAILY ACTIVITY: SOMETIMES
WHEN YOU HAVE A HEADACHE HOW OFTEN DO YOU WISH YOU COULD LIE DOWN: ALWAYS
WHEN YOU HAVE HEADACHES HOW OFTEN IS THE PAIN SEVERE: VERY OFTEN

## 2022-08-30 NOTE — LETTER
8/30/2022       RE: Sherita Glasgow  [address]  [facility] [address]     Dear Colleague,    Thank you for referring your patient, Sherita Glasgow, to the [facility] NEUROLOGY [facility]INIC [place] [facility]. Please see a copy of my visit note below.    Has been doing a little bit better.   Nurtec works well when headache comes on and much better than rizatriptan. Took it 8-10 /in the last few months. Headaches in general decreased a little bit.   Menstrual period and headaches are not as bed as used to be. The Nurtec working.   [employer] did not start it because $800 with insurance coverage.   Headaches 4-5/months in the past couple of month. Nurtec working and mood is better (may be due to celexa).     Plan:  Monitor headaches and will wait and see with adding any other preventative treatment.  Nurtec as needed one tab in 24 hours and every 48 hours and may try Nurtec every other day for prevention if needed.    Retrial of sumatriptan as needed for rescue treatment and Ok to take it the same day with Nurtec.   Follow up 6-9 months or sooner via MyChart or return if headaches were getting worse or any new symptoms.     Patient is alert and no in apparent acute distress,  mentation appears normal, judgement and insight intact, normal speech.    I discussed all my recommendations with Sherita Glasgow who verbalizes understanding and comfortable with the plan.  All of patient's questions were answered from the best of my knowledge.  Patient is in agreement with the plan.     33 minutes spent on the date of the encounter doing video access, chart  review,  results review,  meds review, treatment plan, documentation and further activities as noted above      BINH Hanson, CNP [facility]  Headache certified  [facility]

## 2022-08-30 NOTE — TELEPHONE ENCOUNTER
CITALOPRAM 20MG TABLETS      Last Written Prescription Date:  7/28/22  Last Fill Quantity: 30,   # refills: 0  Last Office Visit : 3/1/22 recommended 1 month follow up  Future Office visit:  None scheduled    Routing refill request to provider for review/approval because:  Last prescribed limited quantity, no refills

## 2022-08-30 NOTE — TELEPHONE ENCOUNTER
Prior Authorization Retail Medication Request  NURTEC FOR PREVENTION    Medication/Dose: Nurtec 75 mg every other day  ICD code (if different than what is on RX):  Chronic migraine  Previously Tried and Failed:  Would avoid BBB due to history of depression, would avoid CCB -bradycardia and topiramate -no relievable birth control   Rizatriptan-works about 50% of time , naratriptan, sumatriptan citalopram      Rationale:  Headaches 4-5/months in the past couple of month. Nurtec working and mood is better (may be due to celexa)     Insurance Name:  [facility]  Insurance ID:  [id number]      Pharmacy Information (if different than what is on RX)  Name:    Phone:

## 2022-08-30 NOTE — PROGRESS NOTES
Sherita is a 36 year old who is being evaluated via a billable video visit.      How would you like to obtain your AVS? MyChart  If the video visit is dropped, the invitation should be resent by: Send to e-mail at: [email or web address]  Will anyone else be joining your video visit? No        Video-Visit Details    Video Start Time: 9:05 AM    Type of service:  Video Visit    Video End Time:9:27 AM    Originating Location (pt. Location): Home    Distant Location (provider location):  [facility] NEUROLOGY [facility]     Platform used for Video Visit: AmWell     Has been doing a little bit better.   Nurtec works well when headache comes on and much better than rizatriptan. Took it 8-10 /in the last few months. Headaches in general decreased a little bit.   Menstrual period and headaches are not as bed as used to be. The Nurtec working.   [employer] did not start it because $800 with insurance coverage.   Headaches 4-5/months in the past couple of month. Nurtec working and mood is better (may be due to celexa).     Plan:  Monitor headaches and will wait and see with adding any other preventative treatment.  Nurtec as needed one tab in 24 hours and every 48 hours and may try Nurtec every other day for prevention if needed.    Retrial of sumatriptan as needed for rescue treatment and Ok to take it the same day with Nurtec.   Follow up 6-9 months or sooner via Eventohart or return if headaches were getting worse or any new symptoms.     Patient is alert and no in apparent acute distress,  mentation appears normal, judgement and insight intact, normal speech.    I discussed all my recommendations with Sherita Glasgow who verbalizes understanding and comfortable with the plan.  All of patient's questions were answered from the best of my knowledge.  Patient is in agreement with the plan.     33 minutes spent on the date of the encounter doing video access, chart  review,  results review,  meds review, treatment  plan, documentation and further activities as noted above    BINH Hanson, CNP [facility]  Headache certified  [facility]

## 2022-08-30 NOTE — TELEPHONE ENCOUNTER
Central Prior Authorization Team   Phone: [phone]      PA Initiation    Medication: Nurtec 75 mg every other day  Insurance Company: [employer] - Phone [phone] Fax [phone]  Pharmacy Filling the Rx: [id number] [place] [id number] AT [facility] OF [address]  Filling Pharmacy Phone: [phone]  Filling Pharmacy Fax:    Start Date: 8/30/2022

## 2022-08-30 NOTE — PATIENT INSTRUCTIONS
Plan:  Monitor headaches and will wait and see with adding any other preventative treatment.  Nurtec as needed one tab in 24 hours and every 48 hours and may try Nurtec every other day for prevention if needed.    Retrial of sumatriptan as needed for rescue treatment and Ok to take it the same day with Nurtec.   Follow up 6-9 month or sooner via MyChart or return if headaches were getting worse or any new symptoms.

## 2022-08-31 RX ORDER — CITALOPRAM HYDROBROMIDE 20 MG/1
20 TABLET ORAL DAILY
Qty: 30 TABLET | Refills: 0 | Status: SHIPPED | OUTPATIENT
Start: 2022-08-31 | End: 2022-10-08

## 2022-08-31 NOTE — TELEPHONE ENCOUNTER
Prior Authorization Not Needed per Insurance    Medication: Nurtec 75 mg every other day-PA Not Needed  Insurance Company: [employer] - Phone [phone] Fax [phone]  Expected CoPay:      Pharmacy Filling the Rx: [facility] #[id number] [id number] AT [facility] OF [address]  Pharmacy Notified: Yes  Patient Notified: No    Per pharmacy, patient picked up on 8/22/22 for #8 tabs but is able to fill again until 9/2/22.

## 2022-09-16 ENCOUNTER — OFFICE VISIT (OUTPATIENT)
Dept: URGENT CARE | Facility: URGENT CARE | Age: 37
End: 2022-09-16
Payer: COMMERCIAL

## 2022-09-16 ENCOUNTER — ANCILLARY PROCEDURE (OUTPATIENT)
Dept: GENERAL RADIOLOGY | Facility: CLINIC | Age: 37
End: 2022-09-16
Attending: PHYSICIAN ASSISTANT
Payer: COMMERCIAL

## 2022-09-16 VITALS
DIASTOLIC BLOOD PRESSURE: 70 MMHG | TEMPERATURE: 98.3 F | HEART RATE: 80 BPM | SYSTOLIC BLOOD PRESSURE: 123 MMHG | OXYGEN SATURATION: 98 % | RESPIRATION RATE: 18 BRPM

## 2022-09-16 DIAGNOSIS — J06.9 UPPER RESPIRATORY TRACT INFECTION, UNSPECIFIED TYPE: ICD-10-CM

## 2022-09-16 DIAGNOSIS — Z20.822 SUSPECTED 2019 NOVEL CORONAVIRUS INFECTION: ICD-10-CM

## 2022-09-16 DIAGNOSIS — J22 LRTI (LOWER RESPIRATORY TRACT INFECTION): Primary | ICD-10-CM

## 2022-09-16 PROCEDURE — U0003 INFECTIOUS AGENT DETECTION BY NUCLEIC ACID (DNA OR RNA); SEVERE ACUTE RESPIRATORY SYNDROME CORONAVIRUS 2 (SARS-COV-2) (CORONAVIRUS DISEASE [COVID-19]), AMPLIFIED PROBE TECHNIQUE, MAKING USE OF HIGH THROUGHPUT TECHNOLOGIES AS DESCRIBED BY CMS-2020-01-R: HCPCS | Performed by: PHYSICIAN ASSISTANT

## 2022-09-16 PROCEDURE — 99214 OFFICE O/P EST MOD 30 MIN: CPT | Mod: CS | Performed by: PHYSICIAN ASSISTANT

## 2022-09-16 PROCEDURE — 71046 X-RAY EXAM CHEST 2 VIEWS: CPT | Mod: TC | Performed by: RADIOLOGY

## 2022-09-16 PROCEDURE — U0005 INFEC AGEN DETEC AMPLI PROBE: HCPCS | Performed by: PHYSICIAN ASSISTANT

## 2022-09-16 RX ORDER — BENZONATATE 100 MG/1
CAPSULE ORAL
COMMUNITY
Start: 2022-09-13 | End: 2022-11-10

## 2022-09-16 RX ORDER — AZITHROMYCIN 250 MG/1
TABLET, FILM COATED ORAL
Qty: 6 TABLET | Refills: 0 | Status: SHIPPED | OUTPATIENT
Start: 2022-09-16 | End: 2022-09-21

## 2022-09-16 RX ORDER — POLYMYXIN B SULFATE AND TRIMETHOPRIM 1; 10000 MG/ML; [USP'U]/ML
SOLUTION OPHTHALMIC
COMMUNITY
Start: 2022-09-13 | End: 2022-11-10

## 2022-09-16 RX ORDER — RIBOFLAVIN (VITAMIN B2) 400 MG
1 TABLET ORAL DAILY
COMMUNITY
Start: 2022-08-22 | End: 2022-11-10

## 2022-09-16 NOTE — PROGRESS NOTES
SUBJECTIVE:   Sherita Glasgow is a 37 year old female presenting with a chief complaint of cough for 5 days now fever, some pain in back bilaterally.  Some focal pain at left sterum. Pink eye is improving.    SUBJECTIVE:  Chief Complaint:   Chief Complaint   Patient presents with     Cough     Pt states she has a cough and pink eye X 5 days. Pt states her cough has been getting worse. Pt had covid 2.5 months ago        Past Medical History:   Diagnosis Date     Migraines     without aura     Current Outpatient Medications   Medication Sig Dispense Refill     benzonatate (TESSALON) 100 MG capsule TAKE 1 TO 2 CAPSULES BY MOUTH THREE TIMES DAILY AS NEEDED FOR COUGH       citalopram (CELEXA) 20 MG tablet Take 1 tablet (20 mg) by mouth daily For additional refills, please schedule a follow-up appointment at [phone] 30 tablet 0     Coenzyme Q10 (COQ10) 100 MG CAPS Take 1 capsule (100 mg) by mouth daily 90 capsule 3     magnesium oxide (MAG-OX) 400 (241.3 Mg) MG tablet Take 1 tablet (400 mg) by mouth daily 90 tablet 3     Omega-3 Fatty Acids (OMEGA 3 500 PO) Take 1 capsule by mouth 1-2 times per week       Prenatal Vit-Fe Fumarate-FA (PRENATAL MULTIVITAMIN PLUS IRON) 27-0.8 MG TABS per tablet Take 1 tablet by mouth daily       riboflavin 400 MG CAPS Take 400 mg by mouth daily 90 capsule 3     Riboflavin 400 MG TABS Take 1 tablet by mouth daily       Rimegepant Sulfate 75 MG TBDP Take 75 mg by mouth See Admin Instructions Take 75 mg orally per 24 hours every 48 hours as needed 16 tablet 11     SUMAtriptan (IMITREX) 50 MG tablet Take 1-2 tablets ( mg) by mouth at onset of headache for migraine May repeat in 2 hours. Max 4 tablets/24 hours. 12 tablet 9     trimethoprim-polymyxin b (POLYTRIM) 06821-6.1 UNIT/ML-% ophthalmic solution        VITAMIN D, CHOLECALCIFEROL, PO Take 2,000 Units by mouth daily          ROS:  Review of systems negative except as stated above.    OBJECTIVE:  /70   Pulse 80   Temp  98.3  F (36.8  C) (Tympanic)   Resp 18   SpO2 98%   GENERAL APPEARANCE: healthy, alert and no distress  EYES:  conjunctiva clear  HENT: ear canals and TM's normal.  Nose and mouth without ulcers, erythema or lesions  NECK: supple, nontender, no lymphadenopathy  RESP: Lungs clear to auscultation - no rales, rhonchi or wheezes  CV: regular rates and rhythm, normal S1 S2, no murmur noted  NEURO: Normal strength and tone  SKIN: no suspicious cyanosis, lesions or rashes      Results for orders placed or performed in visit on 09/16/22   XR Chest 2 Views     Status: None    Narrative    XR CHEST 2 VIEWS 9/16/2022 3:15 PM    HISTORY: Upper respiratory tract infection, unspecified type    COMPARISON: None.      Impression    IMPRESSION: Negative chest.    RYAN CARLIN MD         SYSTEM ID:  [id number]       ASSESSMENT:  (J22) LRTI (lower respiratory tract infection)  (primary encounter diagnosis)  Plan: azithromycin (ZITHROMAX) 250 MG tablet      (J06.9) Upper respiratory tract infection, unspecified type  Plan: XR Chest 2 Views      (Z20.822) Suspected 2019 novel coronavirus infection  Plan: Symptomatic; Unknown COVID-19 Virus         (Coronavirus) by PCR Nose      Red flags and emergent follow up discussed, and understood by patient  Follow up with PCP if symptoms worsen or fail to improve

## 2022-09-17 ENCOUNTER — HEALTH MAINTENANCE LETTER (OUTPATIENT)
Age: 37
End: 2022-09-17

## 2022-09-17 LAB — SARS-COV-2 RNA RESP QL NAA+PROBE: NEGATIVE

## 2022-09-29 DIAGNOSIS — F41.1 GENERALIZED ANXIETY DISORDER: ICD-10-CM

## 2022-10-08 NOTE — TELEPHONE ENCOUNTER
"  citalopram (CELEXA) 20 MG tablet   Last Written Prescription Date:8/31/22  Last Fill Quantity: 30,   # refills: 0  Last Office Visit : 3/1/22  Future Office visit: none    \" Patient will follow up in 1 month \"      Routing refill request to provider for review/approval because: needs appt. rf or refuse?       "

## 2022-10-10 RX ORDER — CITALOPRAM HYDROBROMIDE 20 MG/1
20 TABLET ORAL DAILY
Qty: 30 TABLET | Refills: 0 | Status: SHIPPED | OUTPATIENT
Start: 2022-10-10 | End: 2022-11-10

## 2022-10-12 DIAGNOSIS — F41.1 GENERALIZED ANXIETY DISORDER: ICD-10-CM

## 2022-10-19 RX ORDER — CITALOPRAM HYDROBROMIDE 20 MG/1
TABLET ORAL
Qty: 30 TABLET | Refills: 0 | OUTPATIENT
Start: 2022-10-19

## 2022-11-10 ENCOUNTER — OFFICE VISIT (OUTPATIENT)
Dept: FAMILY MEDICINE | Facility: CLINIC | Age: 37
End: 2022-11-10
Payer: COMMERCIAL

## 2022-11-10 VITALS
HEART RATE: 63 BPM | SYSTOLIC BLOOD PRESSURE: 105 MMHG | OXYGEN SATURATION: 96 % | BODY MASS INDEX: 22.91 KG/M2 | HEIGHT: 67 IN | DIASTOLIC BLOOD PRESSURE: 72 MMHG | TEMPERATURE: 97.7 F | WEIGHT: 146 LBS | RESPIRATION RATE: 12 BRPM

## 2022-11-10 DIAGNOSIS — G43.009 MIGRAINE WITHOUT AURA AND WITHOUT STATUS MIGRAINOSUS, NOT INTRACTABLE: ICD-10-CM

## 2022-11-10 DIAGNOSIS — Z00.00 ROUTINE GENERAL MEDICAL EXAMINATION AT A HEALTH CARE FACILITY: Primary | ICD-10-CM

## 2022-11-10 DIAGNOSIS — F41.1 GENERALIZED ANXIETY DISORDER: ICD-10-CM

## 2022-11-10 RX ORDER — CITALOPRAM HYDROBROMIDE 20 MG/1
20 TABLET ORAL DAILY
Qty: 90 TABLET | Refills: 4 | Status: SHIPPED | OUTPATIENT
Start: 2022-11-10 | End: 2023-02-20

## 2022-11-10 ASSESSMENT — ENCOUNTER SYMPTOMS
ARTHRALGIAS: 0
BREAST MASS: 0
DYSURIA: 0
SORE THROAT: 0
CHILLS: 0
HEMATOCHEZIA: 0
HEMATURIA: 0
JOINT SWELLING: 0
PARESTHESIAS: 0
EYE PAIN: 0
NAUSEA: 0
DIZZINESS: 0
SHORTNESS OF BREATH: 0
DIARRHEA: 0
MYALGIAS: 0
FREQUENCY: 0
HEARTBURN: 0
CONSTIPATION: 0
FEVER: 0
ABDOMINAL PAIN: 0
PALPITATIONS: 0
WEAKNESS: 0
HEADACHES: 0
NERVOUS/ANXIOUS: 0
COUGH: 0

## 2022-11-10 NOTE — PROGRESS NOTES
"CC: Physical      Sherita is a 37 year old female who presents to clinic for an annual exam.       Chronic health conditions:  Patient Active Problem List    Diagnosis Date Noted     Migraine without aura and without status migrainosus, not intractable 2018     Priority: Medium     Since middle school.   Sees neurology. Takes Nurtec as needed, takes Coenzyme Q10, magnesium & riboflavin  Went away during her first pregnancy, but during her 2nd pregnancy and post-partum she had a lot of headaches.   Triggers: stress, menses.        Adjustment disorder with mixed anxiety and depressed mood 2018     Priority: Medium     Citalopram 20 mg daily since 2022. Has not had any other medication trials.   Sees a therapist every 2 weeks.         We reviewed and updated her medication list as necessary. Her past medical and surgical history as well as her family history were reviewed and updated as necessary.    Gynecological history:  Menstrual symptoms: menses came back this summer 8 months post-partum  Last Pap:  2018, result Normal and HPV negative  History of abnormal Paps: No  Concern for STIs: no  Safety: Feels safe in relationship  Would you like to become pregnant in the next year? no   Contraceptive method: condoms - plans to discuss vasectomy w/  vs IUD insertion  OB history:     Other health-related habits:  Nutrition: Varied & balanced  Physical activity: Regular physical activity 5 days/week  Tobacco: None    Alcohol: Occassional  Drug use: no   Vaccines: UTD  Hep C & HIV screenin2021 - negative  DM screening: negative DM screening during pregnancy  Lipids: 2018 - , TG 44, HDL 75, LDL 77      OBJECTIVE:   /72 (BP Location: Right arm, Patient Position: Sitting, Cuff Size: Adult Regular)   Pulse 63   Temp 97.7  F (36.5  C) (Skin)   Resp 12   Ht 1.705 m (5' 7.13\")   Wt 66.2 kg (146 lb)   LMP 10/29/2022 (Exact Date)   SpO2 96%   Breastfeeding No   BMI 22.78 " kg/m     General: Healthy female in NAD.  Cooperative and pleasant.  HEENT: Normocephalic, atraumatic. TMs normal. Supple neck, without lymphadenopathy or thyromegaly.    Breasts: No obvious masses, axillary adenopathy or fibrocystic changes.    Lungs: CTA bilaterally.  CV: RRR, normal S1 and S2, without murmurs, rubs, or gallops appreciated.    Abdomen: Soft, NT, ND.  No masses or hepatosplenomegaly appreciated.    Extremities: WWW, without deformity, edema.  Skin: Clear without lesions or rashes.   Neuro: Grossly intact, nonfocal.  Psych: Mood and affect appropriate.      ASSESSMENT AND PLAN:   Sherita was seen today for physical.    Diagnoses and all orders for this visit:    Routine general medical examination at a health care facility    Generalized anxiety disorder  -     citalopram (CELEXA) 20 MG tablet; Take 1 tablet (20 mg) by mouth daily    Migraine without aura and without status migrainosus, not intractable      Health maintenance updates: will RTC for pap in 2023.  Continue citalopram 20 mg daily and therapy.   Continue follow-up with neurology re:migraines.      Return in about 1 year (around 11/10/2023) for physical.    Martine Hand MD  Family Medicine

## 2022-11-10 NOTE — NURSING NOTE
"    37 year old  Chief Complaint   Patient presents with     Physical       Blood pressure 105/72, pulse 63, temperature 97.7  F (36.5  C), temperature source Skin, resp. rate 12, height 1.705 m (5' 7.13\"), weight 66.2 kg (146 lb), last menstrual period 10/29/2022, SpO2 96 %, not currently breastfeeding. Body mass index is 22.78 kg/m .  Patient Active Problem List   Diagnosis     Migraine without aura and without status migrainosus, not intractable     Adjustment disorder with mixed anxiety and depressed mood       Wt Readings from Last 2 Encounters:   11/10/22 66.2 kg (146 lb)   05/23/22 63.5 kg (140 lb)     BP Readings from Last 3 Encounters:   11/10/22 105/72   09/16/22 123/70   05/23/22 120/81         Current Outpatient Medications   Medication     citalopram (CELEXA) 20 MG tablet     Coenzyme Q10 (COQ10) 100 MG CAPS     magnesium oxide (MAG-OX) 400 (241.3 Mg) MG tablet     Omega-3 Fatty Acids (OMEGA 3 500 PO)     riboflavin 400 MG CAPS     Rimegepant Sulfate 75 MG TBDP     SUMAtriptan (IMITREX) 50 MG tablet     VITAMIN D, CHOLECALCIFEROL, PO     benzonatate (TESSALON) 100 MG capsule     Prenatal Vit-Fe Fumarate-FA (PRENATAL MULTIVITAMIN PLUS IRON) 27-0.8 MG TABS per tablet     Riboflavin 400 MG TABS     trimethoprim-polymyxin b (POLYTRIM) 18167-3.1 UNIT/ML-% ophthalmic solution     No current facility-administered medications for this visit.       Social History     Tobacco Use     Smoking status: Never     Smokeless tobacco: Never   Vaping Use     Vaping Use: Never used   Substance Use Topics     Alcohol use: Yes     Comment: 3-5 drinks/week     Drug use: No       Health Maintenance Due   Topic Date Due     ADVANCE CARE PLANNING  Never done     YEARLY PREVENTIVE VISIT  01/14/2021     HPV TEST  02/23/2023     PAP  02/23/2023       Lab Results   Component Value Date    PAP NIL 02/23/2018         November 10, 2022 9:15 AM    "

## 2022-12-02 DIAGNOSIS — G43.009 MIGRAINE WITHOUT AURA AND WITHOUT STATUS MIGRAINOSUS, NOT INTRACTABLE: ICD-10-CM

## 2022-12-06 RX ORDER — MAGNESIUM OXIDE TAB 400 MG (241.3 MG ELEMENTAL MG) 400 (241.3 MG) MG
TAB ORAL
Qty: 90 TABLET | Refills: 0 | Status: SHIPPED | OUTPATIENT
Start: 2022-12-06 | End: 2023-06-02

## 2023-01-02 NOTE — PROGRESS NOTES
Refer to initial evaluation as discharge summary as patient did return to complete recommended course of therapy.

## 2023-01-04 DIAGNOSIS — G43.009 MIGRAINE WITHOUT AURA AND WITHOUT STATUS MIGRAINOSUS, NOT INTRACTABLE: ICD-10-CM

## 2023-01-05 RX ORDER — RIBOFLAVIN (VITAMIN B2) 400 MG
TABLET ORAL
Qty: 90 TABLET | Refills: 3 | Status: SHIPPED | OUTPATIENT
Start: 2023-01-05 | End: 2024-05-28

## 2023-01-17 ENCOUNTER — VIRTUAL VISIT (OUTPATIENT)
Dept: NEUROLOGY | Facility: CLINIC | Age: 38
End: 2023-01-17
Payer: COMMERCIAL

## 2023-01-17 DIAGNOSIS — G43.009 MIGRAINE WITHOUT AURA AND WITHOUT STATUS MIGRAINOSUS, NOT INTRACTABLE: ICD-10-CM

## 2023-01-17 DIAGNOSIS — G43.709 CHRONIC MIGRAINE WITHOUT AURA WITHOUT STATUS MIGRAINOSUS, NOT INTRACTABLE: Primary | ICD-10-CM

## 2023-01-17 PROCEDURE — 99214 OFFICE O/P EST MOD 30 MIN: CPT | Mod: 95 | Performed by: NURSE PRACTITIONER

## 2023-01-17 RX ORDER — RIZATRIPTAN BENZOATE 10 MG/1
10 TABLET, ORALLY DISINTEGRATING ORAL
Qty: 18 TABLET | Refills: 11 | Status: SHIPPED | OUTPATIENT
Start: 2023-01-17 | End: 2023-12-18

## 2023-01-17 ASSESSMENT — HEADACHE IMPACT TEST (HIT 6)
WHEN YOU HAVE A HEADACHE HOW OFTEN DO YOU WISH YOU COULD LIE DOWN: ALWAYS
HOW OFTEN DID HEADACHS LIMIT CONCENTRATION ON WORK OR DAILY ACTIVITY: VERY OFTEN
HOW OFTEN HAVE YOU FELT TOO TIRED TO WORK BECAUSE OF YOUR HEADACHES: VERY OFTEN
HOW OFTEN HAVE YOU FELT FED UP OR IRRITATED BECAUSE OF YOUR HEADACHES: VERY OFTEN
WHEN YOU HAVE HEADACHES HOW OFTEN IS THE PAIN SEVERE: VERY OFTEN
HOW OFTEN DO HEADACHES LIMIT YOUR DAILY ACTIVITIES: VERY OFTEN
HIT6 TOTAL SCORE: 68

## 2023-01-17 ASSESSMENT — MIGRAINE DISABILITY ASSESSMENT (MIDAS)
HOW MANY DAYS DID YOU NOT DO HOUSEWORK BECAUSE OF HEADACHES: 20
ON A SCALE FROM 0-10 ON AVERAGE HOW PAINFUL WERE HEADACHES: 7
HOW MANY DAYS IN THE PAST 3 MONTHS HAVE YOU HAD A HEADACHE: 40
HOW MANY DAYS DID YOU MISS WORK OR SCHOOL BECAUSE OF HEADACHES: 10
HOW MANY DAYS WAS HOUSEWORK PRODUCTIVITY CUT IN HALF DUE TO HEADACHES: 20
HOW MANY DAYS WAS YOUR PRODUCTIVITY CUT IN HALF BECAUSE OF HEADACHES: 40
HOW OFTEN WERE SOCIAL ACTIVITIES MISSED DUE TO HEADACHES: 10
TOTAL SCORE: 100

## 2023-01-17 NOTE — LETTER
"1/17/2023       RE: Sherita ALBERTO Pee  [address]  [facility] [address]     Dear Colleague,    Thank you for referring your patient, Sherita Glasgow, to the [facility]IN[facility] [place] at [facility]. Please see a copy of my visit note below.    Headache Clinic follow up visit note:  Last Headache visit 8/30/2022, see note for details  Per Correlix message from 1/13,   \"In late December I had a bad hormonal migraine that lasted 3-4 days and I took Nurtec and Maxalt both many times (Nurtec only every 2 days, but I took Maxalt a few times a day during this period with little relief). I have generally needed to take either Nurtec or Maxalt almost every other day in the last 4-6 weeks which feels much too frequent. I have had an increased in stress due to new job, holidays, travel, etc., but I really don't want to be struggling with a headache nearly every day.\"     Reports that Headaches were Ok in the fall and particularly bad in past couple of month. Has been having particularly severe headaches around menstrual period.   Nurtec took it twice only and it does help a little bit.   Rizatriptan helps when takes a nap  Sumatriptan occasionally as needed -a couple times in the last month.   Headaches around menstrual cycle for 4 days   Migraines frequecy at least 15/month and feels needs rescue treatment.     BBB/CCB -not fav -low BP at baseline    Plan:  Nurtec every other day for migraine headache prevention  Rescue treatment -rizatriptan or sumatriptan as needed but limit use to no more than 9 days per month  Naproxen or ibuprofen as needed. Limit use to no more than 14 days per month   Follow up -via [employer] or otherwise 3-5 months or sooner if needed     Patient is alert and no in apparent acute distress,  mentation appears normal, judgement and insight intact, normal speech.    I discussed all my recommendations with Sherita Glasgow who " verbalizes understanding and comfortable with the plan.  All of patient's questions were answered from the best of my knowledge.  Patient is in agreement with the plan.     30 minutes spent on the date of the encounter doing video access, chart  review,   meds review, treatment plan, documentation and further activities as noted above    BINH Hanson, CNP [facility]  Headache certified  [facility]

## 2023-01-17 NOTE — PROGRESS NOTES
"Sherita is a 37 year old who is being evaluated via a billable video visit.      How would you like to obtain your AVS? [employer]  If the video visit is dropped, the invitation should be resent by: Text to cell phone: [phone]  Will anyone else be joining your video visit? No        Video-Visit Details    Type of service:  Video Visit   Start 11:32 AM  End 12:06 PM  Originating Location (pt. Location): Home  Distant Location (provider location):  Off-site  Platform used for Video Visit: [facility]       Headache Clinic follow up visit note:  Last Headache visit 8/30/2022, see note for details  Per Qufenqi message from 1/13,   \"In late December I had a bad hormonal migraine that lasted 3-4 days and I took Nurtec and Maxalt both many times (Nurtec only every 2 days, but I took Maxalt a few times a day during this period with little relief). I have generally needed to take either Nurtec or Maxalt almost every other day in the last 4-6 weeks which feels much too frequent. I have had an increased in stress due to new job, holidays, travel, etc., but I really don't want to be struggling with a headache nearly every day.\"     Reports that Headaches were Ok in the fall and particularly bad in past couple of month. Has been having particularly severe headaches around menstrual period.   Nurtec took it twice only and it does help a little bit.   Rizatriptan helps when takes a nap  Sumatriptan occasionally as needed -a couple times in the last month.   Headaches around menstrual cycle for 4 days   Migraines frequecy at least 15/month and feels needs rescue treatment.     BBB/CCB -not fav -low BP at baseline    Plan:  Nurtec every other day for migraine headache prevention  Rescue treatment -rizatriptan or sumatriptan as needed but limit use to no more than 9 days per month  Naproxen or ibuprofen as needed. Limit use to no more than 14 days per month   Follow up -via [employer] or otherwise 3-5 months or sooner if needed "     Patient is alert and no in apparent acute distress,  mentation appears normal, judgement and insight intact, normal speech.    I discussed all my recommendations with Sherita Glasgow who verbalizes understanding and comfortable with the plan.  All of patient's questions were answered from the best of my knowledge.  Patient is in agreement with the plan.     30 minutes spent on the date of the encounter doing video access, chart  review,   meds review, treatment plan, documentation and further activities as noted above    BINH Hanson, CNP [facility]  Headache certified  [facility]

## 2023-02-10 DIAGNOSIS — G43.009 MIGRAINE WITHOUT AURA AND WITHOUT STATUS MIGRAINOSUS, NOT INTRACTABLE: ICD-10-CM

## 2023-02-13 NOTE — TELEPHONE ENCOUNTER
Coenzyme Q10 (COQ10) 100 MG CAPS      Last Written Prescription Date:  1/28/2022  Last Fill Quantity: 90,   # refills: 3  Last Office Visit : 3/1/2022  [facility]/Dr Godoy  Future Office visit:  2/20/2023  [facility]    Routing refill request to provider for review/approval because:  Medication not on the refill protocol.  - assoc Dx Migraine

## 2023-02-14 ENCOUNTER — TELEPHONE (OUTPATIENT)
Dept: NEUROLOGY | Facility: CLINIC | Age: 38
End: 2023-02-14
Payer: COMMERCIAL

## 2023-02-14 NOTE — TELEPHONE ENCOUNTER
[facility] Call Center    Phone Message    May a detailed message be left on voicemail: yes     Reason for Call: Other: Pt is returning a call from the nurse Valery and would like a call back. She says she can call her back today after 3:30 pm but if she doesn't hear back, then she will send a tok tok tok message.     Action Taken: Message routed to:  [facility] NEUROLOGY    Travel Screening: Not Applicable

## 2023-02-14 NOTE — TELEPHONE ENCOUNTER
I called Sherita to discuss her my chart message regarding Nurtec.  I asked that she call me back or send me a my chart message

## 2023-02-15 ENCOUNTER — TELEPHONE (OUTPATIENT)
Dept: NEUROLOGY | Facility: CLINIC | Age: 38
End: 2023-02-15
Payer: COMMERCIAL

## 2023-02-15 RX ORDER — VITAMIN B COMPLEX
100 TABLET ORAL DAILY
Qty: 90 CAPSULE | Refills: 0 | Status: SHIPPED | OUTPATIENT
Start: 2023-02-15 | End: 2023-06-02

## 2023-02-15 NOTE — TELEPHONE ENCOUNTER
Prior Authorization Retail Medication Request    Medication/Dose: Nurtec 75 mg  ICD code (if different than what is on RX):   G43.709  Previously Tried and Failed:  Would avoid BBB due to history of depression, would avoid CCB -bradycardia and topiramate -no relievable birth control   Rizatriptan-works about 50% of time , naratriptan, sumatriptan citalopram      Rationale:  Headaches 4-5/months in the past couple of month. Nurtec working and mood is better (may be due to celexa)     Insurance Name:  [facility]  Insurance ID:  [id number]      Pharmacy Information (if different than what is on RX)  Name:    Phone:

## 2023-02-17 NOTE — TELEPHONE ENCOUNTER
Prior Authorization Not Needed per Insurance    Medication: rimegepant (NURTEC) 75 MG ODT tablet--NO PA NEEDED  Insurance Company: [employer] - Phone [phone] Fax [phone]  Expected CoPay:      Pharmacy Filling the Rx: [id number] - [facility] [address] [place] [facility] [address]  Pharmacy Notified: Yes  Patient Notified: Yes **Instructed pharmacy to notify patient when script is ready to /ship.**    Per pharmacy no PA is needed, its just expensive. Patient may have a deductible to meet.

## 2023-02-20 ENCOUNTER — VIRTUAL VISIT (OUTPATIENT)
Dept: INTERNAL MEDICINE | Facility: CLINIC | Age: 38
End: 2023-02-20
Attending: INTERNAL MEDICINE
Payer: COMMERCIAL

## 2023-02-20 DIAGNOSIS — R53.83 OTHER FATIGUE: ICD-10-CM

## 2023-02-20 DIAGNOSIS — F41.1 GENERALIZED ANXIETY DISORDER: Primary | ICD-10-CM

## 2023-02-20 PROCEDURE — 99214 OFFICE O/P EST MOD 30 MIN: CPT | Mod: VID | Performed by: INTERNAL MEDICINE

## 2023-02-20 RX ORDER — CITALOPRAM HYDROBROMIDE 40 MG/1
40 TABLET ORAL DAILY
Qty: 30 TABLET | Refills: 4 | Status: SHIPPED | OUTPATIENT
Start: 2023-02-20 | End: 2023-07-27

## 2023-02-20 ASSESSMENT — ANXIETY QUESTIONNAIRES
GAD7 TOTAL SCORE: 12
GAD7 TOTAL SCORE: 12
IF YOU CHECKED OFF ANY PROBLEMS ON THIS QUESTIONNAIRE, HOW DIFFICULT HAVE THESE PROBLEMS MADE IT FOR YOU TO DO YOUR WORK, TAKE CARE OF THINGS AT HOME, OR GET ALONG WITH OTHER PEOPLE: SOMEWHAT DIFFICULT
3. WORRYING TOO MUCH ABOUT DIFFERENT THINGS: NEARLY EVERY DAY
7. FEELING AFRAID AS IF SOMETHING AWFUL MIGHT HAPPEN: NOT AT ALL
1. FEELING NERVOUS, ANXIOUS, OR ON EDGE: NEARLY EVERY DAY
5. BEING SO RESTLESS THAT IT IS HARD TO SIT STILL: SEVERAL DAYS
6. BECOMING EASILY ANNOYED OR IRRITABLE: SEVERAL DAYS
2. NOT BEING ABLE TO STOP OR CONTROL WORRYING: SEVERAL DAYS

## 2023-02-20 ASSESSMENT — PATIENT HEALTH QUESTIONNAIRE - PHQ9
5. POOR APPETITE OR OVEREATING: NEARLY EVERY DAY
SUM OF ALL RESPONSES TO PHQ QUESTIONS 1-9: 9

## 2023-02-20 NOTE — LETTER
2/20/2023       RE: Sherita Glasgow  [address]  [place] [facility] [address]     Dear Colleague,    Thank you for referring your patient, Sherita Glasgow, to the [facility] at [facility]. Please see a copy of my visit note below.    Shertia is a 37 year old who is being evaluated via a billable video visit.      How would you like to obtain your AVS? MyChart  If the video visit is dropped, the invitation should be resent by: Text to cell phone: [phone]  Will anyone else be joining your video visit? No          Assessment & Plan     Generalized anxiety disorder  Reviewed management of generalized anxiety disorder.  Given patient's symptoms, recommend increasing the dose of citalopram to 40 mg.  She was also encouraged to continue with counseling.  We will reassess in approximately 6 to 8 weeks.  Patient expressed understanding and agreement with the plan.  She will contact the clinic with any concerns.  - citalopram (CELEXA) 40 MG tablet; Take 1 tablet (40 mg) by mouth daily    Other fatigue  Reviewed possible causes of fatigue with patient.  Recommend checking for metabolic and endocrine abnormalities that could explain fatigue.  Patient will be advised on test results accordingly.  - TSH with free T4 reflex; Future  - Basic Metabolic Panel; Future  - CBC with platelets; Future  - Ferritin; Future      I spent a total of 35 minutes on the day of the visit.   Time spent doing chart review, history and exam, documentation and further activities per the note           Return in about 6 weeks (around 4/3/2023).    Brittney Godoy MD  [facility]    Subjective   Sherita is a 37 year old, presenting for the following health issues:  No chief complaint on file.      HPI     Patient is following up on medication for depression. She states that she is feeling better on current dose of citalopram.  She has changed her job in December, reports increase in stress level. She also reports more issues with migraine. Patient states that her insurance in not covering preventive medications migraines. She is very frustrated with lack of coverage. She is striggling with severe migraines around the time of her periods.     Review of Systems   Constitutional, HEENT, cardiovascular, pulmonary, GI, , musculoskeletal, neuro, skin, endocrine and psych systems are negative, except as otherwise noted.     Objective           Vitals:  No vitals were obtained today due to virtual visit.    Physical Exam   GENERAL: Healthy, alert and no distress  EYES: Eyes grossly normal to inspection.  No discharge or erythema, or obvious scleral/conjunctival abnormalities.  RESP: No audible wheeze, cough, or visible cyanosis.  No visible retractions or increased work of breathing.    SKIN: Visible skin clear. No significant rash, abnormal pigmentation or lesions.  NEURO: Cranial nerves grossly intact.  Mentation and speech appropriate for age.  PSYCH: Mentation appears normal, affect normal/bright, judgement and insight intact, normal speech and appearance well-groomed.               Video-Visit Details    Type of service:  Video Visit     Originating Location (pt. Location): Home    Distant Location (provider location):  Off-site  Platform used for Video Visit: [employer]

## 2023-02-20 NOTE — PROGRESS NOTES
Sherita is a 37 year old who is being evaluated via a billable video visit.      How would you like to obtain your AVS? MyChart  If the video visit is dropped, the invitation should be resent by: Text to cell phone: [phone]  Will anyone else be joining your video visit? No          Assessment & Plan     Generalized anxiety disorder  Reviewed management of generalized anxiety disorder.  Given patient's symptoms, recommend increasing the dose of citalopram to 40 mg.  She was also encouraged to continue with counseling.  We will reassess in approximately 6 to 8 weeks.  Patient expressed understanding and agreement with the plan.  She will contact the clinic with any concerns.  - citalopram (CELEXA) 40 MG tablet; Take 1 tablet (40 mg) by mouth daily    Other fatigue  Reviewed possible causes of fatigue with patient.  Recommend checking for metabolic and endocrine abnormalities that could explain fatigue.  Patient will be advised on test results accordingly.  - TSH with free T4 reflex; Future  - Basic Metabolic Panel; Future  - CBC with platelets; Future  - Ferritin; Future      I spent a total of 35 minutes on the day of the visit.   Time spent doing chart review, history and exam, documentation and further activities per the note           Return in about 6 weeks (around 4/3/2023).    Brittney Godoy MD  [facility]    Guillermo Magallon is a 37 year old, presenting for the following health issues:  No chief complaint on file.      HPI     Patient is following up on medication for depression. She states that she is feeling better on current dose of citalopram. She has changed her job in December, reports increase in stress level. She also reports more issues with migraine. Patient states that her insurance in not covering preventive medications migraines. She is very frustrated with lack of coverage. She is striggling with severe migraines around the time of her periods.      Review of Systems   Constitutional, HEENT, cardiovascular, pulmonary, GI, , musculoskeletal, neuro, skin, endocrine and psych systems are negative, except as otherwise noted.      Objective           Vitals:  No vitals were obtained today due to virtual visit.    Physical Exam   GENERAL: Healthy, alert and no distress  EYES: Eyes grossly normal to inspection.  No discharge or erythema, or obvious scleral/conjunctival abnormalities.  RESP: No audible wheeze, cough, or visible cyanosis.  No visible retractions or increased work of breathing.    SKIN: Visible skin clear. No significant rash, abnormal pigmentation or lesions.  NEURO: Cranial nerves grossly intact.  Mentation and speech appropriate for age.  PSYCH: Mentation appears normal, affect normal/bright, judgement and insight intact, normal speech and appearance well-groomed.                Video-Visit Details    Type of service:  Video Visit     Originating Location (pt. Location): Home    Distant Location (provider location):  Off-site  Platform used for Video Visit: Beat[employer]

## 2023-03-09 ENCOUNTER — E-VISIT (OUTPATIENT)
Dept: URGENT CARE | Facility: CLINIC | Age: 38
End: 2023-03-09
Payer: COMMERCIAL

## 2023-03-09 DIAGNOSIS — H10.33 ACUTE BACTERIAL CONJUNCTIVITIS OF BOTH EYES: Primary | ICD-10-CM

## 2023-03-09 PROCEDURE — 99421 OL DIG E/M SVC 5-10 MIN: CPT | Performed by: PHYSICIAN ASSISTANT

## 2023-03-09 RX ORDER — OFLOXACIN 3 MG/ML
SOLUTION/ DROPS OPHTHALMIC
Qty: 5 ML | Refills: 0 | Status: SHIPPED | OUTPATIENT
Start: 2023-03-09 | End: 2023-03-16

## 2023-03-09 NOTE — PATIENT INSTRUCTIONS
Thank you for choosing us for your care. I have placed an order for a prescription so that you can start treatment. View your full visit summary for details by clicking on the link below. Your pharmacist will able to address any questions you may have about the medication.     If you re not feeling better within 2-3 days, please schedule an appointment.  You can schedule an appointment right here in [facility], or call [phone]  If the visit is for the same symptoms as your eVisit, we ll refund the cost of your eVisit if seen within seven days.      Bacterial Conjunctivitis    You have an infection in the membranes covering the white part of the eye. This part of the eye is called the conjunctiva. The infection is called conjunctivitis. The most common symptoms of conjunctivitis include a thick, pus-like discharge from the eye, swollen eyelids, redness, eyelids sticking together upon awakening, and a gritty or scratchy feeling in the eye. Your infection was caused by bacteria. It may be treated with medicine. With treatment, the infection takes about 7 to 10 days to resolve.   Home care    Use prescribed antibiotic eye drops or ointment as directed to treat the infection.    Apply a warm compress (towel soaked in warm water) to the affected eye 3 to 4 times a day. Do this just before applying medicine to the eye.    Use a warm, wet cloth to wipe away crusting of the eyelids in the morning. This is caused by mucus drainage during the night. You may also use saline irrigating solution or artificial tears to rinse away mucus in the eye. Do not put a patch over the eye.    Wash your hands before and after touching the infected eye. This is to prevent spreading the infection to the other eye, and to other people. Don't share your towels or washcloths with others.    You may use acetaminophen or ibuprofen to control pain, unless another medicine was prescribed. Talk with your healthcare provider before using these  medicines if you have chronic liver or kidney disease. Also talk with your provider if you have ever had a stomach ulcer or digestive bleeding.    Don't wear contact lenses until your eyes have healed and all symptoms are gone.    Follow-up care  Follow up with your healthcare provider, or as advised.  When to seek medical advice  Call your healthcare provider right away if any of these occur:    Worsening vision    Increasing pain in the eye    Increasing swelling or redness of the eyelid    Redness spreading around the eye  [employer] last reviewed this educational content on 4/1/2020 2000-2021 [employer]. All rights reserved. This information is not intended as a substitute for professional medical care. Always follow your healthcare professional's instructions.

## 2023-06-02 ENCOUNTER — TELEPHONE (OUTPATIENT)
Dept: NEUROLOGY | Facility: CLINIC | Age: 38
End: 2023-06-02

## 2023-06-02 ENCOUNTER — VIRTUAL VISIT (OUTPATIENT)
Dept: NEUROLOGY | Facility: CLINIC | Age: 38
End: 2023-06-02
Payer: COMMERCIAL

## 2023-06-02 DIAGNOSIS — G43.009 MIGRAINE WITHOUT AURA AND WITHOUT STATUS MIGRAINOSUS, NOT INTRACTABLE: ICD-10-CM

## 2023-06-02 DIAGNOSIS — G43.709 CHRONIC MIGRAINE WITHOUT AURA WITHOUT STATUS MIGRAINOSUS, NOT INTRACTABLE: Primary | ICD-10-CM

## 2023-06-02 PROCEDURE — 99214 OFFICE O/P EST MOD 30 MIN: CPT | Mod: 95 | Performed by: NURSE PRACTITIONER

## 2023-06-02 RX ORDER — VITAMIN B COMPLEX
100 TABLET ORAL DAILY
Qty: 90 CAPSULE | Refills: 4 | Status: SHIPPED | OUTPATIENT
Start: 2023-06-02

## 2023-06-02 RX ORDER — NARATRIPTAN 2.5 MG/1
2.5 TABLET ORAL
Qty: 18 TABLET | Refills: 11 | Status: SHIPPED | OUTPATIENT
Start: 2023-06-02

## 2023-06-02 RX ORDER — LANOLIN ALCOHOL/MO/W.PET/CERES
400 CREAM (GRAM) TOPICAL DAILY
Qty: 90 TABLET | Refills: 4 | Status: SHIPPED | OUTPATIENT
Start: 2023-06-02

## 2023-06-02 ASSESSMENT — HEADACHE IMPACT TEST (HIT 6)
HOW OFTEN DO HEADACHES LIMIT YOUR DAILY ACTIVITIES: SOMETIMES
HOW OFTEN HAVE YOU FELT FED UP OR IRRITATED BECAUSE OF YOUR HEADACHES: SOMETIMES
HOW OFTEN DID HEADACHS LIMIT CONCENTRATION ON WORK OR DAILY ACTIVITY: SOMETIMES
HOW OFTEN HAVE YOU FELT TOO TIRED TO WORK BECAUSE OF YOUR HEADACHES: SOMETIMES
WHEN YOU HAVE HEADACHES HOW OFTEN IS THE PAIN SEVERE: SOMETIMES
WHEN YOU HAVE A HEADACHE HOW OFTEN DO YOU WISH YOU COULD LIE DOWN: ALWAYS
HIT6 TOTAL SCORE: 63

## 2023-06-02 ASSESSMENT — MIGRAINE DISABILITY ASSESSMENT (MIDAS)
HOW MANY DAYS DID YOU MISS WORK OR SCHOOL BECAUSE OF HEADACHES: 6
HOW OFTEN WERE SOCIAL ACTIVITIES MISSED DUE TO HEADACHES: 5
HOW MANY DAYS IN THE PAST 3 MONTHS HAVE YOU HAD A HEADACHE: 20
HOW MANY DAYS WAS YOUR PRODUCTIVITY CUT IN HALF BECAUSE OF HEADACHES: 6
ON A SCALE FROM 0-10 ON AVERAGE HOW PAINFUL WERE HEADACHES: 5
HOW MANY DAYS WAS HOUSEWORK PRODUCTIVITY CUT IN HALF DUE TO HEADACHES: 6
HOW MANY DAYS DID YOU NOT DO HOUSEWORK BECAUSE OF HEADACHES: 3
TOTAL SCORE: 26

## 2023-06-02 NOTE — LETTER
6/2/2023       RE: Sherita Glasgow  [address]  [place] [facility] [address]     Dear Colleague,    Thank you for referring your patient, Sherita Glasgow, to the [facility] NEUROLOGY CLINIC [place] at [facility]. Please see a copy of my visit note below.    Sherita is a 37 year old who is being evaluated via a billable video visit.      Subjective   Sherita is a 37 year old, presenting for the following health issues:headache   RECHECK    Tried to fill Nurtec and always come with $2000 monthly supply. Unaffordable for prevention every other day use.   Total headache days per month -12-15 headache days per month Duration -up to 4 days especially around menstrual cycle.   Sumatriptan or rizatriptan helps but outside of the cycle -not with menstrual migraines   Nurtec was helpful and would like to have it as an option.   Emgality -was very expensive   Botox was discussed in the past and hesitant to do it     Have tried   Citalopram, nurtec, sumatriptan, rizatriptan, topiramate sometime ago, propranolol -in the past and both were not effective.      No pregnancy plans    Chronic migraine needs to optimize migraine prevention.   Treatment Plan:  A trial of Emgality. Recommended a trial of migraine preventive treatment with Emgality. Side effects-allergic reaction or pain in the injection side or redness in the injection side. Unknown full scope of side effects with a long term use. Pregnancy is contraindicated.   Rescue treatment -a trial of naratriptan as needed for menstrual related migraines. Limit use to no more than 9 days per month. May try naratriptan 1-2 days before menstrual period and 2-3 days into period. Helps if menstrual cycle regular. Side effects-dizziness, drowsiness/tiredness   Rizatriptan as needed   May try Ubrelvy as needed around menstrual related migraines or regular migraines and Ok to take the same day with naratriptan. Side effects of  "ubrelvy-dizziness, nausea   Follow up in 3-5 months or sooner if needed -         Objective    Vitals - Patient Reported  Weight (Patient Reported): 62.6 kg (138 lb)  Height (Patient Reported): 167.6 cm (5' 6\")  BMI (Based on Pt Reported Ht/Wt): 22.27  Pain Score: Moderate Pain (4)  Pain Loc: Head        Physical Exam   Patient is alert and no in apparent acute distress,  mentation appears normal, judgement and insight intact, normal speech.    I discussed all my recommendations with Sherita Glasgow who verbalizes understanding and comfortable with the plan.     32 minutes spent on the date of the encounter doing video access, chart  review, meds review, treatment plan, documentation and further activities as noted above    BINH Hanson, CNP [facility]  Headache certified  [facility]      Video-Visit Details    Type of service:  Video Visit   Originating Location (pt. Location): Home  Distant Location (provider location):  Off-site  Platform used for Video Visit: [employer]    "

## 2023-06-02 NOTE — TELEPHONE ENCOUNTER
Prior Authorization Retail Medication Request    Medication/Dose: ubrogepant (UBRELVY) 50 MG tablet  ICD code (if different than what is on RX):    G43.709  Previously Tried and Failed:  Would avoid BBB due to history of depression, would avoid CCB -bradycardia and topiramate -no relievable birth control   Rizatriptan-works about 50% of time , naratriptan, sumatriptan citalopram   Nurtec unaffordable   Total headache days per month -12-15 headache days per month Duration -up to 4 days especially around menstrual cycle.   Sumatriptan or rizatriptan helps but outside of the cycle -not with menstrual migraines      Rationale:  Migraine      Insurance Name:  [facility]  Insurance ID:  [id number]     Pharmacy Information (if different than what is on RX)  Name:    Phone:

## 2023-06-02 NOTE — TELEPHONE ENCOUNTER
Prior Authorization Retail Medication Request    Medication/Dose: galcanezumab-gnlm (EMGALITY) 120 MG/ML injection 2 mL 11 6/2/2023  --  Sig: Inject 240 mg subcutaneous first month and then inject 120 mg subcutaneous every 28 days      ICD code (if different than what is on RX):    G43.709  Previously Tried and Failed:  Would avoid BBB due to history of depression, would avoid CCB -bradycardia and topiramate -no relievable birth control   Rizatriptan-works about 50% of time , naratriptan, sumatriptan citalopram   Nurtec unaffordable   Total headache days per month -12-15 headache days per month Duration -up to 4 days especially around menstrual cycle.   Sumatriptan or rizatriptan helps but outside of the cycle -not with menstrual migraines         Rationale:  Migraine      Insurance Name:  [facility]  Insurance ID:  [id number]      Pharmacy Information (if different than what is on RX)  Name:    Phone:

## 2023-06-02 NOTE — PATIENT INSTRUCTIONS
Treatment Plan:  A trial of Emgality. Recommended a trial of migraine preventive treatment with Emgality. Side effects-allergic reaction or pain in the injection side or redness in the injection side. Unknown full scope of side effects with a long term use. Pregnancy is contraindicated.   Rescue treatment -a trial of naratriptan as needed for menstrual related migraines. Limit use to no more than 9 days per month. May try naratriptan 1-2 days before menstrual period and 2-3 days into period. Helps if menstrual cycle regular. Side effects-dizziness, drowsiness/tiredness   Rizatriptan as needed   May try Ubrelvy as needed around menstrual related migraines or regular migraines and Ok to take the same day with naratriptan. Side effects of ubrelvy-dizziness, nausea   Follow up in 3-5 months or sooner if needed -

## 2023-06-02 NOTE — NURSING NOTE
Is the patient currently in the state of [place]? YES    Visit mode:VIDEO    If the visit is dropped, the patient can be reconnected by: VIDEO VISIT: Text to cell phone: [phone]    Will anyone else be joining the visit? NO      How would you like to obtain your AVS? MyChart    Are changes needed to the allergy or medication list? NO    Reason for visit: RECHECK

## 2023-06-02 NOTE — PROGRESS NOTES
"Sherita is a 37 year old who is being evaluated via a billable video visit.      Subjective   Sherita is a 37 year old, presenting for the following health issues:headache   RECHECK    Tried to fill Nurtec and always come with $2000 monthly supply. Unaffordable for prevention every other day use.   Total headache days per month -12-15 headache days per month Duration -up to 4 days especially around menstrual cycle.   Sumatriptan or rizatriptan helps but outside of the cycle -not with menstrual migraines   Nurtec was helpful and would like to have it as an option.   Emgality -was very expensive   Botox was discussed in the past and hesitant to do it     Have tried   Citalopram, nurtec, sumatriptan, rizatriptan, topiramate sometime ago, propranolol -in the past and both were not effective.      No pregnancy plans    Chronic migraine needs to optimize migraine prevention.   Treatment Plan:  A trial of Emgality. Recommended a trial of migraine preventive treatment with Emgality. Side effects-allergic reaction or pain in the injection side or redness in the injection side. Unknown full scope of side effects with a long term use. Pregnancy is contraindicated.   Rescue treatment -a trial of naratriptan as needed for menstrual related migraines. Limit use to no more than 9 days per month. May try naratriptan 1-2 days before menstrual period and 2-3 days into period. Helps if menstrual cycle regular. Side effects-dizziness, drowsiness/tiredness   Rizatriptan as needed   May try Ubrelvy as needed around menstrual related migraines or regular migraines and Ok to take the same day with naratriptan. Side effects of ubrelvy-dizziness, nausea   Follow up in 3-5 months or sooner if needed -          Objective    Vitals - Patient Reported  Weight (Patient Reported): 62.6 kg (138 lb)  Height (Patient Reported): 167.6 cm (5' 6\")  BMI (Based on Pt Reported Ht/Wt): 22.27  Pain Score: Moderate Pain (4)  Pain Loc: Head        Physical " Exam   Patient is alert and no in apparent acute distress,  mentation appears normal, judgement and insight intact, normal speech.    I discussed all my recommendations with Sherita Glasgow who verbalizes understanding and comfortable with the plan.     32 minutes spent on the date of the encounter doing video access, chart  review, meds review, treatment plan, documentation and further activities as noted above    BINH Hanson, CNP [facility]  Headache certified  [facility]      Video-Visit Details    Type of service:  Video Visit   Originating Location (pt. Location): Home  Distant Location (provider location):  Off-site  Platform used for Video Visit: [employer]

## 2023-06-05 ENCOUNTER — VIRTUAL VISIT (OUTPATIENT)
Dept: INTERNAL MEDICINE | Facility: CLINIC | Age: 38
End: 2023-06-05
Attending: INTERNAL MEDICINE
Payer: COMMERCIAL

## 2023-06-05 DIAGNOSIS — F41.1 GENERALIZED ANXIETY DISORDER: Primary | ICD-10-CM

## 2023-06-05 PROCEDURE — 99213 OFFICE O/P EST LOW 20 MIN: CPT | Mod: VID | Performed by: INTERNAL MEDICINE

## 2023-06-05 ASSESSMENT — PATIENT HEALTH QUESTIONNAIRE - PHQ9
5. POOR APPETITE OR OVEREATING: MORE THAN HALF THE DAYS
SUM OF ALL RESPONSES TO PHQ QUESTIONS 1-9: 9

## 2023-06-05 ASSESSMENT — ANXIETY QUESTIONNAIRES
2. NOT BEING ABLE TO STOP OR CONTROL WORRYING: MORE THAN HALF THE DAYS
3. WORRYING TOO MUCH ABOUT DIFFERENT THINGS: MORE THAN HALF THE DAYS
6. BECOMING EASILY ANNOYED OR IRRITABLE: MORE THAN HALF THE DAYS
IF YOU CHECKED OFF ANY PROBLEMS ON THIS QUESTIONNAIRE, HOW DIFFICULT HAVE THESE PROBLEMS MADE IT FOR YOU TO DO YOUR WORK, TAKE CARE OF THINGS AT HOME, OR GET ALONG WITH OTHER PEOPLE: SOMEWHAT DIFFICULT
5. BEING SO RESTLESS THAT IT IS HARD TO SIT STILL: NOT AT ALL
1. FEELING NERVOUS, ANXIOUS, OR ON EDGE: MORE THAN HALF THE DAYS
GAD7 TOTAL SCORE: 11
7. FEELING AFRAID AS IF SOMETHING AWFUL MIGHT HAPPEN: SEVERAL DAYS
GAD7 TOTAL SCORE: 11

## 2023-06-05 NOTE — PROGRESS NOTES
Sherita is a 37 year old who is being evaluated via a billable video visit.      How would you like to obtain your AVS? MyChart  If the video visit is dropped, the invitation should be resent by: Text to cell phone: [phone]  Will anyone else be joining your video visit? No        Assessment & Plan     Generalized anxiety disorder  Discussed management of VESNA with patient. She would prefer to continue with current medical therapy and counseling. Recommend follow up in 2 months. Patient will follow up in clinic, may consider switching to fluoxetine if symptoms do not improve.        I spent a total of 20 minutes on the day of the visit.   Time spent by me doing chart review, history and exam, documentation and further activities per the note           Return in about 2 months (around 8/5/2023).    Brittney Godoy MD  [facility] [place]   Sherita is a 37 year old, presenting for the following health issues:  No chief complaint on file.         View : No data to display.              HPI       Patient is following up on generalized anxiety disorder. She reports that she had a busy year. She states that overall the medication has been helpful. She is also working with a therapist, currently is on every 2 week schedule. She has been working on incorporating management techniques in every day life.       Review of Systems   Constitutional, HEENT, cardiovascular, pulmonary, GI, , musculoskeletal, neuro, skin, endocrine and psych systems are negative, except as otherwise noted.      Objective           Vitals:  No vitals were obtained today due to virtual visit.    Physical Exam   GENERAL: Healthy, alert and no distress  EYES: Eyes grossly normal to inspection.  No discharge or erythema, or obvious scleral/conjunctival abnormalities.  RESP: No audible wheeze, cough, or visible cyanosis.  No visible retractions or increased work of breathing.    SKIN: Visible skin clear.  No significant rash, abnormal pigmentation or lesions.  NEURO: Cranial nerves grossly intact.  Mentation and speech appropriate for age.  PSYCH: Mentation appears normal, affect normal/bright, judgement and insight intact, normal speech and appearance well-groomed.                Video-Visit Details    Type of service:  Video Visit     Originating Location (pt. Location): Home  Distant Location (provider location):  Off-site  Platform used for Video Visit: [employer]

## 2023-06-05 NOTE — LETTER
6/5/2023       RE: Sherita Glasgow  [address]  [place] [address]     Dear Colleague,    Thank you for referring your patient, Sherita Glasgow, to the [facility]. Please see a copy of my visit note below.    Sherita is a 37 year old who is being evaluated via a billable video visit.      How would you like to obtain your AVS? MyChart  If the video visit is dropped, the invitation should be resent by: Text to cell phone: [phone]  Will anyone else be joining your video visit? No        Assessment & Plan     Generalized anxiety disorder  Discussed management of VESNA with patient. She would prefer to continue with current medical therapy and counseling. Recommend follow up in 2 months. Patient will follow up in clinic, may consider switching to fluoxetine if symptoms do not improve.        I spent a total of 20 minutes on the day of the visit.   Time spent by me doing chart review, history and exam, documentation and further activities per the note           Return in about 2 months (around 8/5/2023).    Brittney Godoy MD  [facility]    Subjective   Sherita is a 37 year old, presenting for the following health issues:  No chief complaint on file.         View : No data to display.              HPI       Patient is following up on generalized anxiety disorder. She reports that she had a busy year. She states that overall the medication has been helpful. She is also working with a therapist, currently is on every 2 week schedule. She has been working on incorporating management techniques in every day life.       Review of Systems   Constitutional, HEENT, cardiovascular, pulmonary, GI, , musculoskeletal, neuro, skin, endocrine and psych systems are negative, except as otherwise noted.     Objective           Vitals:  No vitals were obtained today due to virtual  visit.    Physical Exam   GENERAL: Healthy, alert and no distress  EYES: Eyes grossly normal to inspection.  No discharge or erythema, or obvious scleral/conjunctival abnormalities.  RESP: No audible wheeze, cough, or visible cyanosis.  No visible retractions or increased work of breathing.    SKIN: Visible skin clear. No significant rash, abnormal pigmentation or lesions.  NEURO: Cranial nerves grossly intact.  Mentation and speech appropriate for age.  PSYCH: Mentation appears normal, affect normal/bright, judgement and insight intact, normal speech and appearance well-groomed.         Video-Visit Details    Type of service:  Video Visit     Originating Location (pt. Location): Home  Distant Location (provider location):  Off-site  Platform used for Video Visit: [employer]

## 2023-06-06 ENCOUNTER — TELEPHONE (OUTPATIENT)
Dept: NEUROLOGY | Facility: CLINIC | Age: 38
End: 2023-06-06
Payer: COMMERCIAL

## 2023-06-06 NOTE — TELEPHONE ENCOUNTER
Prior Authorization Approval    Medication: EMGALITY 120 MG/ML SC SOAJ  Authorization Effective Date: 5/3/2023  Authorization Expiration Date: 6/1/2024  Approved Dose/Quantity:   Reference #:     Insurance Company: [employer] - Phone [phone] Fax [phone]  Expected CoPay:       CoPay Card Available:      Financial Assistance Needed:   Which Pharmacy is filling the prescription: [id number] [id number] AT [facility]  Pharmacy Notified: Yes  Patient Notified: No

## 2023-06-06 NOTE — TELEPHONE ENCOUNTER
Prior Authorization Approval    Medication: UBRELVY 50 MG PO TABS  Authorization Effective Date: 5/3/2023  Authorization Expiration Date: 6/1/2024  Approved Dose/Quantity:   Reference #:     Insurance Company: [employer] - Phone [phone] Fax [phone]  Expected CoPay:       CoPay Card Available:      Financial Assistance Needed:   Which Pharmacy is filling the prescription: [id number] [address] AT [facility] & [facility]  Pharmacy Notified: Yes  Patient Notified: No

## 2023-06-06 NOTE — TELEPHONE ENCOUNTER
Left Voicemail (1st Attempt) and Sent Mychart (1st Attempt) for the patient to call back and schedule the following:    Appointment type: follow up   Provider: Amrita Bassett APRN CNP  Return date: 3-4 months   Specialty phone number: [phone]  Additional appointment(s) needed: N/A  Additonal Notes: LV, sent Roberta Lazo on 6/6/2023 at 9:50 AM

## 2023-07-21 DIAGNOSIS — G43.009 MIGRAINE WITHOUT AURA AND WITHOUT STATUS MIGRAINOSUS, NOT INTRACTABLE: ICD-10-CM

## 2023-07-27 DIAGNOSIS — F41.1 GENERALIZED ANXIETY DISORDER: ICD-10-CM

## 2023-07-27 RX ORDER — CITALOPRAM HYDROBROMIDE 40 MG/1
40 TABLET ORAL DAILY
Qty: 30 TABLET | Refills: 4 | Status: SHIPPED | OUTPATIENT
Start: 2023-07-27 | End: 2023-11-14

## 2023-07-27 NOTE — TELEPHONE ENCOUNTER
6/5/2023  [facility]   PHQ9 done 6/5/23  Brittney Godoy MD  Internal Medicine

## 2023-11-14 NOTE — PROGRESS NOTES
CC: Physical      Sherita is a 38 year old female who presents to clinic for an annual exam.       She has always felt fatigue, since childhood. Sleeps 7 hrs/night. Doesn't feel well-rested. Doesn't snore. Has labs ordered by Dr. Godoy for evaluation (ferritin, CBC, BMP, TSH).     Chronic health conditions:  Patient Active Problem List    Diagnosis Date Noted    Migraine without aura and without status migrainosus, not intractable 2018     Priority: Medium     Since middle school.   Sees neurology. On Emgality. Takes rizatriptan as needed, takes Coenzyme Q10, magnesium & riboflavin  Went away during her first pregnancy, but during her 2nd pregnancy and post-partum she had a lot of headaches.   Triggers: stress, menses.       Generalized anxiety disorder 2018     Priority: Medium     Citalopram daily since 2022, dose increased to 40 mg daily earlier in . Has not had any other medication trials.   Sees a therapist regularly.         We reviewed and updated her medication list as necessary. Her past medical and surgical history as well as her family history were reviewed and updated as necessary.    Gynecological history:  Menstrual symptoms: regular monthly menses  Last Pap:  2018, result Normal and HPV negative  History of abnormal Paps: No  Concern for STIs: no  Contraceptive method: condoms - has discussed vasectomy w/   OB history:     Other health-related habits:  Nutrition: Varied & balanced  Physical activity: Regular physical activity 5 days/week  Tobacco: None    Alcohol: Occassional  Drug use: no   Vaccines: UTD  Hep C & HIV screenin2021 - negative  DM screening: negative DM screening during pregnancy  Lipids: 2018 - , TG 44, HDL 75, LDL 77      OBJECTIVE:   /72   Pulse 63   Temp 97.1  F (36.2  C)   Wt 67.1 kg (148 lb)   LMP 2023 (Approximate)   SpO2 100%   BMI 23.09 kg/m     General: Healthy female in NAD.  Cooperative and pleasant.  HEENT:  Normocephalic, atraumatic. OP moist without erythema or exudate. TMs normal. Supple neck, without lymphadenopathy or thyromegaly.    Breasts: No obvious masses, axillary adenopathy or fibrocystic changes.    Lungs: CTA bilaterally.  CV: RRR, normal S1 and S2, without murmurs, rubs, or gallops appreciated.    Abdomen: Soft, NT, ND.  No masses or hepatosplenomegaly appreciated.    Gyn: Normal appearing external genitalia without lesion.  Vaginal mucosa pink and moist.  Cervix visualized and Pap performed.  Bimanual exam revealed no uterine or ovarian masses.   Extremities: Warm and well perfused, without deformity, edema.  Skin: Clear without lesions or rashes.   Neuro: Grossly intact, nonfocal.  Psych: Mood and affect appropriate.      ASSESSMENT AND PLAN:   Sherita was seen today for physical.    Diagnoses and all orders for this visit:    Routine general medical examination at a health care facility    Migraine without aura and without status migrainosus, not intractable    Screening for cervical cancer  -     Gynecologic Cytology (PAP Smear); Future    Generalized anxiety disorder  -     citalopram (CELEXA) 40 MG tablet; Take 1 tablet (40 mg) by mouth daily    Screening for cholesterol level  -     Lipid panel; Future  -     Lipid panel    Other fatigue  -     TSH with free T4 reflex  -     Basic Metabolic Panel  -     CBC with platelets  -     Ferritin      Health maintenance updates: Pap smear and lipid screening today.   For fatigue, labs ordered by Dr. Godoy. Discussed aiming for 8-8.5 hrs of sleep if possible. Consider sleep medicine referral if labs normal and increase in duration of sleep doesn't improve symptoms of unrefreshing sleep and fatigue.   Continue citalopram 40 mg daily and therapy.   Continue follow-up with neurology re:migraines.      Return in about 1 year (around 11/15/2024) for Preventive Visit.    Martine Hand MD  Family Medicine

## 2023-11-15 ENCOUNTER — OFFICE VISIT (OUTPATIENT)
Dept: FAMILY MEDICINE | Facility: CLINIC | Age: 38
End: 2023-11-15
Payer: COMMERCIAL

## 2023-11-15 VITALS
TEMPERATURE: 97.1 F | HEART RATE: 63 BPM | WEIGHT: 148 LBS | SYSTOLIC BLOOD PRESSURE: 109 MMHG | BODY MASS INDEX: 23.09 KG/M2 | OXYGEN SATURATION: 100 % | DIASTOLIC BLOOD PRESSURE: 72 MMHG

## 2023-11-15 DIAGNOSIS — Z12.4 SCREENING FOR CERVICAL CANCER: ICD-10-CM

## 2023-11-15 DIAGNOSIS — Z13.220 SCREENING FOR CHOLESTEROL LEVEL: ICD-10-CM

## 2023-11-15 DIAGNOSIS — F41.1 GENERALIZED ANXIETY DISORDER: ICD-10-CM

## 2023-11-15 DIAGNOSIS — G43.009 MIGRAINE WITHOUT AURA AND WITHOUT STATUS MIGRAINOSUS, NOT INTRACTABLE: ICD-10-CM

## 2023-11-15 DIAGNOSIS — R53.83 OTHER FATIGUE: ICD-10-CM

## 2023-11-15 DIAGNOSIS — Z00.00 ROUTINE GENERAL MEDICAL EXAMINATION AT A HEALTH CARE FACILITY: Primary | ICD-10-CM

## 2023-11-15 LAB
ANION GAP SERPL CALCULATED.3IONS-SCNC: 8 MMOL/L (ref 7–15)
BUN SERPL-MCNC: 10.1 MG/DL (ref 6–20)
CALCIUM SERPL-MCNC: 9.1 MG/DL (ref 8.6–10)
CHLORIDE SERPL-SCNC: 103 MMOL/L (ref 98–107)
CHOLEST SERPL-MCNC: 161 MG/DL
CREAT SERPL-MCNC: 0.82 MG/DL (ref 0.51–0.95)
DEPRECATED HCO3 PLAS-SCNC: 27 MMOL/L (ref 22–29)
EGFRCR SERPLBLD CKD-EPI 2021: >90 ML/MIN/1.73M2
ERYTHROCYTE [DISTWIDTH] IN BLOOD BY AUTOMATED COUNT: 12 % (ref 10–15)
FERRITIN SERPL-MCNC: 30 NG/ML (ref 6–175)
GLUCOSE SERPL-MCNC: 88 MG/DL (ref 70–99)
HCT VFR BLD AUTO: 38.6 % (ref 35–47)
HDLC SERPL-MCNC: 67 MG/DL
HGB BLD-MCNC: 13 G/DL (ref 11.7–15.7)
LDLC SERPL CALC-MCNC: 85 MG/DL
MCH RBC QN AUTO: 30.4 PG (ref 26.5–33)
MCHC RBC AUTO-ENTMCNC: 33.7 G/DL (ref 31.5–36.5)
MCV RBC AUTO: 90 FL (ref 78–100)
NONHDLC SERPL-MCNC: 94 MG/DL
PLATELET # BLD AUTO: 233 10E3/UL (ref 150–450)
POTASSIUM SERPL-SCNC: 4.6 MMOL/L (ref 3.4–5.3)
RBC # BLD AUTO: 4.28 10E6/UL (ref 3.8–5.2)
SODIUM SERPL-SCNC: 138 MMOL/L (ref 135–145)
TRIGL SERPL-MCNC: 47 MG/DL
TSH SERPL DL<=0.005 MIU/L-ACNC: 1 UIU/ML (ref 0.3–4.2)
WBC # BLD AUTO: 4.4 10E3/UL (ref 4–11)

## 2023-11-15 RX ORDER — CITALOPRAM HYDROBROMIDE 40 MG/1
40 TABLET ORAL DAILY
Qty: 90 TABLET | Refills: 3 | Status: SHIPPED | OUTPATIENT
Start: 2023-11-15 | End: 2024-07-22 | Stop reason: DRUGHIGH

## 2023-11-17 LAB
BKR LAB AP GYN ADEQUACY: NORMAL
BKR LAB AP GYN INTERPRETATION: NORMAL
BKR LAB AP HPV REFLEX: NORMAL
BKR LAB AP LMP: NORMAL
BKR LAB AP PREVIOUS ABNORMAL: NORMAL
PATH REPORT.COMMENTS IMP SPEC: NORMAL
PATH REPORT.COMMENTS IMP SPEC: NORMAL
PATH REPORT.RELEVANT HX SPEC: NORMAL

## 2023-11-21 LAB
HUMAN PAPILLOMA VIRUS 16 DNA: NEGATIVE
HUMAN PAPILLOMA VIRUS 18 DNA: NEGATIVE
HUMAN PAPILLOMA VIRUS FINAL DIAGNOSIS: NORMAL
HUMAN PAPILLOMA VIRUS OTHER HR: NEGATIVE

## 2023-11-22 ENCOUNTER — E-VISIT (OUTPATIENT)
Dept: URGENT CARE | Facility: CLINIC | Age: 38
End: 2023-11-22
Payer: COMMERCIAL

## 2023-11-22 DIAGNOSIS — H10.31 ACUTE BACTERIAL CONJUNCTIVITIS OF RIGHT EYE: Primary | ICD-10-CM

## 2023-11-22 PROCEDURE — 99421 OL DIG E/M SVC 5-10 MIN: CPT | Performed by: PHYSICIAN ASSISTANT

## 2023-11-22 RX ORDER — OFLOXACIN 3 MG/ML
SOLUTION/ DROPS OPHTHALMIC
Qty: 5 ML | Refills: 0 | Status: SHIPPED | OUTPATIENT
Start: 2023-11-22 | End: 2023-11-29

## 2023-11-22 NOTE — PATIENT INSTRUCTIONS
Thank you for choosing us for your care. I have placed an order for a prescription so that you can start treatment. View your full visit summary for details by clicking on the link below. Your pharmacist will able to address any questions you may have about the medication.     If you re not feeling better within 2-3 days, please schedule an appointment.  You can schedule an appointment right here in [facility], or call [phone]  If the visit is for the same symptoms as your eVisit, we ll refund the cost of your eVisit if seen within seven days.    Pinkeye: Care Instructions  Overview     Pinkeye is redness and swelling of the eye surface and the conjunctiva (the lining of the eyelid and the covering of the white part of the eye). Pinkeye is also called conjunctivitis. Pinkeye is often caused by infection with bacteria or a virus. Dry air, allergies, smoke, and chemicals are other common causes.  Pinkeye often gets better on its own in 7 to 10 days. Antibiotics only help if the pinkeye is caused by bacteria. Pinkeye caused by infection spreads easily. If an allergy or chemical is causing pinkeye, it will not go away unless you can avoid whatever is causing it.  Follow-up care is a key part of your treatment and safety. Be sure to make and go to all appointments, and call your doctor if you are having problems. It's also a good idea to know your test results and keep a list of the medicines you take.  How can you care for yourself at home?  Wash your hands often. Always wash them before and after you treat pinkeye or touch your eyes or face.  Use moist cotton or a clean, wet cloth to remove crust. Wipe from the inside corner of the eye to the outside. Use a clean part of the cloth for each wipe.  Put cold or warm wet cloths on your eye a few times a day if the eye hurts.  Do not wear contact lenses or eye makeup until the pinkeye is gone. Throw away any eye makeup you were using when you got pinkeye. Clean your  "contacts and storage case. If you wear disposable contacts, use a new pair when your eye has cleared and it is safe to wear contacts again.  If the doctor gave you antibiotic ointment or eyedrops, use them as directed. Use the medicine for as long as instructed, even if your eye starts looking better soon. Keep the bottle tip clean, and do not let it touch the eye area.  To put in eyedrops or ointment:  Tilt your head back, and pull your lower eyelid down with one finger.  Drop or squirt the medicine inside the lower lid.  Close your eye for 30 to 60 seconds to let the drops or ointment move around.  Do not touch the ointment or dropper tip to your eyelashes or any other surface.  Do not share towels, pillows, or washcloths while you have pinkeye.  When should you call for help?   Call your doctor now or seek immediate medical care if:    You have pain in your eye, not just irritation on the surface.     You have a change in vision or loss of vision.     You have an increase in discharge from the eye.     Your eye has not started to improve or begins to get worse within 48 hours after you start using antibiotics.     Pinkeye lasts longer than 7 days.   Watch closely for changes in your health, and be sure to contact your doctor if you have any problems.  Where can you learn more?  Go to https://www.[employer].net/patiented  Enter Y392 in the search box to learn more about \"Pinkeye: Care Instructions.\"  Current as of: June 6, 2023               Content Version: 13.8    7021-9781 [employer].   Care instructions adapted under license by your healthcare professional. If you have questions about a medical condition or this instruction, always ask your healthcare professional. [employer] disclaims any warranty or liability for your use of this information.      "

## 2023-12-06 ENCOUNTER — TRANSFERRED RECORDS (OUTPATIENT)
Dept: OPHTHALMOLOGY | Facility: CLINIC | Age: 38
End: 2023-12-06

## 2023-12-06 ENCOUNTER — OFFICE VISIT (OUTPATIENT)
Dept: OPHTHALMOLOGY | Facility: CLINIC | Age: 38
End: 2023-12-06
Payer: COMMERCIAL

## 2023-12-06 DIAGNOSIS — D31.41 IRIS NEVUS, RIGHT: ICD-10-CM

## 2023-12-06 DIAGNOSIS — H52.13 MYOPIA OF BOTH EYES: Primary | ICD-10-CM

## 2023-12-06 PROCEDURE — 92014 COMPRE OPH EXAM EST PT 1/>: CPT | Performed by: OPTOMETRIST

## 2023-12-06 PROCEDURE — 92015 DETERMINE REFRACTIVE STATE: CPT | Performed by: OPTOMETRIST

## 2023-12-06 PROCEDURE — 92310 CONTACT LENS FITTING OU: CPT | Performed by: OPTOMETRIST

## 2023-12-06 ASSESSMENT — CONF VISUAL FIELD
OD_SUPERIOR_TEMPORAL_RESTRICTION: 0
METHOD: COUNTING FINGERS
OS_SUPERIOR_TEMPORAL_RESTRICTION: 0
OS_NORMAL: 1
OD_INFERIOR_NASAL_RESTRICTION: 0
OD_NORMAL: 1
OS_INFERIOR_NASAL_RESTRICTION: 0
OS_INFERIOR_TEMPORAL_RESTRICTION: 0
OD_INFERIOR_TEMPORAL_RESTRICTION: 0
OS_SUPERIOR_NASAL_RESTRICTION: 0
OD_SUPERIOR_NASAL_RESTRICTION: 0

## 2023-12-06 ASSESSMENT — REFRACTION_CURRENTRX
OS_BRAND: BIOFINITY
OD_DIAMETER: 14.0
OS_DIAMETER: 14.0
OS_BASECURVE: 8.6
OD_BRAND: BIOFINITY
OD_BASECURVE: 8.6
OD_SPHERE: -5.00
OS_SPHERE: -4.00

## 2023-12-06 ASSESSMENT — REFRACTION_MANIFEST
OS_CYLINDER: +0.50
OD_SPHERE: -4.50
OS_AXIS: 170
OD_CYLINDER: SPHERE
OS_SPHERE: -4.00

## 2023-12-06 ASSESSMENT — TONOMETRY
OD_IOP_MMHG: 16
OS_IOP_MMHG: 17
IOP_METHOD: ICARE

## 2023-12-06 ASSESSMENT — REFRACTION_WEARINGRX
OD_SPHERE: -4.50
OS_AXIS: 170
OS_SPHERE: -4.00
OD_CYLINDER: +0.50
OD_AXIS: 005
OS_CYLINDER: +1.00

## 2023-12-06 ASSESSMENT — VISUAL ACUITY
METHOD: SNELLEN - LINEAR
OD_CC: 20/20
CORRECTION_TYPE: CONTACTS
OS_CC: 20/20

## 2023-12-06 ASSESSMENT — EXTERNAL EXAM - LEFT EYE: OS_EXAM: NORMAL

## 2023-12-06 ASSESSMENT — CUP TO DISC RATIO
OD_RATIO: 0.3
OS_RATIO: 0.3

## 2023-12-06 ASSESSMENT — EXTERNAL EXAM - RIGHT EYE: OD_EXAM: NORMAL

## 2023-12-06 ASSESSMENT — SLIT LAMP EXAM - LIDS
COMMENTS: NORMAL
COMMENTS: NORMAL

## 2023-12-06 NOTE — NURSING NOTE
Chief Complaints and History of Present Illnesses   Patient presents with    Annual Eye Exam     Pt here for annual eye exam.      Chief Complaint(s) and History of Present Illness(es)       Annual Eye Exam              Laterality: both eyes    Comments: Pt here for annual eye exam.               Comments    Vision is largely unchanged since last exam. No new concerns. Pt happy with contacts.   DUNCAN Borges on 12/6/2023 at 2:54 PM

## 2023-12-06 NOTE — PROGRESS NOTES
History  HPI       Annual Eye Exam    In both eyes. Additional comments: Pt here for annual eye exam.              Comments    Vision is largely unchanged since last exam. No new concerns. Pt happy with contacts.   DUNCAN Borges on 12/6/2023 at 2:54 PM            Last edited by Coleen Moore COA on 12/6/2023  2:54 PM.          Assessment/Plan  (H52.13) Myopia of both eyes  (primary encounter diagnosis)  Comment: Myopia both eyes   Plan: REFRACTION, WA CONTACT LENS FITTING COSMETIC LVL 1 - ADULT         Educated patient on condition and clinical findings. Dispensed spectacle prescription for full time wear. Monitor annually.    The patient appears to be over-minused in her contacts. Dispensed trial lenses and finalized contact lens prescription for 2 years lower powered contact lenses which would be more consistent with her glasses prescription (also included habitual prescription if she cannot tolerate lower power).    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 Monitor annually.    Return to clinic in 1 year for comprehensive eye exam.    Complete documentation of historical and exam elements from today's encounter can  be found in the full encounter summary report (not reduplicated in this progress  note). I personally obtained the chief complaint(s) and history of present illness. I  confirmed and edited as necessary the review of systems, past medical/surgical  history, family history, social history, and examination findings as documented by  others; and I examined the patient myself. I personally reviewed the relevant tests,  images, and reports as documented above. I formulated and edited as necessary the  assessment and plan and discussed the findings and management plan with the  patient and family.    Ceasar Donald OD, FAAO

## 2023-12-18 DIAGNOSIS — G43.009 MIGRAINE WITHOUT AURA AND WITHOUT STATUS MIGRAINOSUS, NOT INTRACTABLE: ICD-10-CM

## 2023-12-18 NOTE — TELEPHONE ENCOUNTER
RX Authorization    Medication: RIZATRIPTAN ODT 10MG TABLETS    Date last refill ordered: 1/17/2023    Quantity ordered: 36    # refills:     Date of last clinic visit with ordering provider: 6/02/2023    Date of next clinic visit with ordering provider: Follow up in 3-5mo    All pertinent protocol data (lab date/result):    Include pertinent information from patients message:

## 2023-12-19 RX ORDER — RIZATRIPTAN BENZOATE 10 MG/1
10 TABLET, ORALLY DISINTEGRATING ORAL
Qty: 18 TABLET | Refills: 9 | Status: SHIPPED | OUTPATIENT
Start: 2023-12-19

## 2024-05-27 ENCOUNTER — MYC MEDICAL ADVICE (OUTPATIENT)
Dept: FAMILY MEDICINE | Facility: CLINIC | Age: 39
End: 2024-05-27

## 2024-05-27 DIAGNOSIS — F41.1 GENERALIZED ANXIETY DISORDER: Primary | ICD-10-CM

## 2024-05-28 DIAGNOSIS — G43.009 MIGRAINE WITHOUT AURA AND WITHOUT STATUS MIGRAINOSUS, NOT INTRACTABLE: ICD-10-CM

## 2024-05-28 RX ORDER — RIBOFLAVIN (VITAMIN B2) 400 MG
1 TABLET ORAL DAILY
Qty: 90 TABLET | Refills: 3 | Status: SHIPPED | OUTPATIENT
Start: 2024-05-28

## 2024-05-28 RX ORDER — CITALOPRAM HYDROBROMIDE 20 MG/1
TABLET ORAL
Qty: 46 TABLET | Refills: 0 | Status: SHIPPED | OUTPATIENT
Start: 2024-05-28 | End: 2024-07-22 | Stop reason: DRUGHIGH

## 2024-05-28 NOTE — TELEPHONE ENCOUNTER
Received a refill request for this patients Riboflavin 400 mg tablets.     Patient was last seen on 11-15-23 for her annual with Dr. Hand.     Will refill per protocol.

## 2024-07-15 DIAGNOSIS — F41.1 GENERALIZED ANXIETY DISORDER: ICD-10-CM

## 2024-07-17 ENCOUNTER — MYC MEDICAL ADVICE (OUTPATIENT)
Dept: FAMILY MEDICINE | Facility: CLINIC | Age: 39
End: 2024-07-17

## 2024-07-17 DIAGNOSIS — F41.1 GENERALIZED ANXIETY DISORDER: Primary | ICD-10-CM

## 2024-07-17 NOTE — TELEPHONE ENCOUNTER
Pt told Dr. Hand that she would like to taper off of her citalopram back in May and was given tapering instructions. Sent message to pt to verify if she has stopped taking this prescription.    Derek IBANEZ, RN  07/17/24 10:48 AM

## 2024-07-18 RX ORDER — CITALOPRAM HYDROBROMIDE 20 MG/1
TABLET ORAL
Qty: 46 TABLET | Refills: 0 | OUTPATIENT
Start: 2024-07-18

## 2024-07-22 RX ORDER — CITALOPRAM HYDROBROMIDE 10 MG/1
10 TABLET ORAL DAILY
Qty: 30 TABLET | Refills: 0 | Status: SHIPPED | OUTPATIENT
Start: 2024-07-22 | End: 2024-08-22

## 2024-08-22 DIAGNOSIS — F41.1 GENERALIZED ANXIETY DISORDER: ICD-10-CM

## 2024-08-22 RX ORDER — CITALOPRAM HYDROBROMIDE 10 MG/1
10 TABLET ORAL DAILY
Qty: 30 TABLET | Refills: 0 | OUTPATIENT
Start: 2024-08-22

## 2024-09-11 ENCOUNTER — MYC MEDICAL ADVICE (OUTPATIENT)
Dept: FAMILY MEDICINE | Facility: CLINIC | Age: 39
End: 2024-09-11

## 2024-09-11 DIAGNOSIS — F41.1 GENERALIZED ANXIETY DISORDER: ICD-10-CM

## 2024-09-12 RX ORDER — CITALOPRAM HYDROBROMIDE 10 MG/1
10 TABLET ORAL DAILY
Qty: 90 TABLET | Refills: 0 | Status: SHIPPED | OUTPATIENT
Start: 2024-09-12

## 2024-09-12 NOTE — TELEPHONE ENCOUNTER
Medication requested: citalopram (CELEXA) 10 MG tablet    Last office visit: 11/15/2023  [facility] appointments: none  Medication last refilled: 7/22/2024; 30 tabs + 0 refills  Last qualifying labs:      6/5/2023  4:19 PM   PHQ-9 and GAD7 Scores    PHQ-9 Total Score 9    VESNA-7 Total Score 11      Prescription approved per [facility] Refill Protocol.    SHAMAR Hopper, RN  09/12/24, 8:27 AM

## 2024-09-12 NOTE — TELEPHONE ENCOUNTER
Pt sent [facility] message requesting a refill of Citalopram. She is tapering the medication and wants to stay at the 10 MG dose for now.    SHAMAR Hopper, RN  09/12/24, 8:29 AM

## 2024-09-23 ENCOUNTER — TRANSFERRED RECORDS (OUTPATIENT)
Dept: HEALTH INFORMATION MANAGEMENT | Facility: CLINIC | Age: 39
End: 2024-09-23
Payer: COMMERCIAL

## 2024-10-03 ENCOUNTER — TRANSFERRED RECORDS (OUTPATIENT)
Dept: HEALTH INFORMATION MANAGEMENT | Facility: CLINIC | Age: 39
End: 2024-10-03

## 2024-10-24 ASSESSMENT — ANXIETY QUESTIONNAIRES
GAD7 TOTAL SCORE: 10
GAD7 TOTAL SCORE: 6

## 2024-10-24 ASSESSMENT — PATIENT HEALTH QUESTIONNAIRE - PHQ9: SUM OF ALL RESPONSES TO PHQ QUESTIONS 1-9: 8

## 2024-11-03 DIAGNOSIS — F41.1 GENERALIZED ANXIETY DISORDER: ICD-10-CM

## 2024-11-04 RX ORDER — CITALOPRAM HYDROBROMIDE 10 MG/1
10 TABLET ORAL DAILY
Qty: 90 TABLET | Refills: 0 | OUTPATIENT
Start: 2024-11-04

## 2024-11-05 ENCOUNTER — MYC MEDICAL ADVICE (OUTPATIENT)
Dept: FAMILY MEDICINE | Facility: CLINIC | Age: 39
End: 2024-11-05

## 2024-11-05 DIAGNOSIS — G43.009 MIGRAINE WITHOUT AURA AND WITHOUT STATUS MIGRAINOSUS, NOT INTRACTABLE: ICD-10-CM

## 2024-11-05 DIAGNOSIS — F41.1 GENERALIZED ANXIETY DISORDER: ICD-10-CM

## 2024-11-05 RX ORDER — CITALOPRAM HYDROBROMIDE 20 MG/1
20 TABLET ORAL DAILY
Qty: 90 TABLET | Refills: 0 | Status: SHIPPED | OUTPATIENT
Start: 2024-11-05

## 2024-11-05 RX ORDER — RIZATRIPTAN BENZOATE 10 MG/1
10 TABLET, ORALLY DISINTEGRATING ORAL
Qty: 18 TABLET | Refills: 0 | Status: SHIPPED | OUTPATIENT
Start: 2024-11-05

## 2024-11-05 NOTE — TELEPHONE ENCOUNTER
"Medication requested: citalopram (CELEXA) 20 MG tablet   Last office visit: 11/15/2023  [facility] appointments: 1/8/2025  Medication last refilled: 9/12/2024; #90 + 0 refills at 10 MG dose. Pt requesting 20 MG dose today.    Medication requested: rizatriptan (MAXALT-MLT) 10 MG ODT   Medication last refilled: 12/19/2023; 18 tabs + 9 refills  Last qualifying labs:     BP Readings from Last 3 Encounters:   11/15/23 109/72   11/10/22 105/72   09/16/22 123/70      6/5/2023  4:19 PM   PHQ-9 / VESNA-7 Scores 8/2015 to present    VESNA-7 Score DocFlow 11    PHQ-9 Score DocFlow 9      Routing refill request to provider for review/approval because:  Pt requesting Citalopram 20 MG, but has been taking 10 MG.   \"I tried to taper off of this medication over the summer, but have found the 20 mg dose is necessary for me right now. \"    Saray Pulido, SHAMAR, RN  11/05/24, 3:51 PM        "

## 2024-11-26 ASSESSMENT — PATIENT HEALTH QUESTIONNAIRE - PHQ9: SUM OF ALL RESPONSES TO PHQ QUESTIONS 1-9: 7

## 2024-11-26 ASSESSMENT — ANXIETY QUESTIONNAIRES: GAD7 TOTAL SCORE: 16

## 2024-12-09 ENCOUNTER — MYC REFILL (OUTPATIENT)
Dept: FAMILY MEDICINE | Facility: CLINIC | Age: 39
End: 2024-12-09

## 2024-12-09 DIAGNOSIS — G43.009 MIGRAINE WITHOUT AURA AND WITHOUT STATUS MIGRAINOSUS, NOT INTRACTABLE: ICD-10-CM

## 2024-12-10 RX ORDER — RIZATRIPTAN BENZOATE 10 MG/1
10 TABLET, ORALLY DISINTEGRATING ORAL
Qty: 18 TABLET | Refills: 1 | Status: SHIPPED | OUTPATIENT
Start: 2024-12-10

## 2024-12-10 RX ORDER — RIZATRIPTAN BENZOATE 10 MG/1
TABLET, ORALLY DISINTEGRATING ORAL
Qty: 18 TABLET | Refills: 0 | OUTPATIENT
Start: 2024-12-10

## 2024-12-10 NOTE — TELEPHONE ENCOUNTER
Medication requested: rizatriptan (MAXALT-MLT) 10 MG ODT   Last office visit: 11/15/23  [facility] appointments: 1/8/25  Medication last refilled: 11/5/24; 18 + 0 refills  Last qualifying labs:   BP Readings from Last 3 Encounters:   11/15/23 109/72   11/10/22 105/72   09/16/22 123/70     Prescription approved per [facility] Refill Protocol.    Derek IBANEZ, RN  12/10/24 8:42 AM

## 2025-01-08 ENCOUNTER — OFFICE VISIT (OUTPATIENT)
Dept: FAMILY MEDICINE | Facility: CLINIC | Age: 40
End: 2025-01-08
Payer: COMMERCIAL

## 2025-01-08 VITALS
HEIGHT: 66 IN | HEART RATE: 69 BPM | WEIGHT: 162 LBS | BODY MASS INDEX: 26.03 KG/M2 | DIASTOLIC BLOOD PRESSURE: 79 MMHG | OXYGEN SATURATION: 96 % | SYSTOLIC BLOOD PRESSURE: 113 MMHG | TEMPERATURE: 97.7 F

## 2025-01-08 DIAGNOSIS — F41.1 GENERALIZED ANXIETY DISORDER: ICD-10-CM

## 2025-01-08 DIAGNOSIS — Z00.00 ROUTINE GENERAL MEDICAL EXAMINATION AT A HEALTH CARE FACILITY: Primary | ICD-10-CM

## 2025-01-08 DIAGNOSIS — G43.009 MIGRAINE WITHOUT AURA AND WITHOUT STATUS MIGRAINOSUS, NOT INTRACTABLE: ICD-10-CM

## 2025-01-08 RX ORDER — RIZATRIPTAN BENZOATE 10 MG/1
10 TABLET, ORALLY DISINTEGRATING ORAL
Qty: 18 TABLET | Refills: 4 | Status: SHIPPED | OUTPATIENT
Start: 2025-01-08

## 2025-01-08 RX ORDER — CITALOPRAM HYDROBROMIDE 20 MG/1
20 TABLET ORAL DAILY
Qty: 90 TABLET | Refills: 4 | Status: SHIPPED | OUTPATIENT
Start: 2025-01-08

## 2025-01-08 RX ORDER — ACYCLOVIR 800 MG/1
TABLET ORAL
COMMUNITY
Start: 2024-10-30 | End: 2025-01-08

## 2025-01-08 SDOH — HEALTH STABILITY: PHYSICAL HEALTH: ON AVERAGE, HOW MANY DAYS PER WEEK DO YOU ENGAGE IN MODERATE TO STRENUOUS EXERCISE (LIKE A BRISK WALK)?: 4 DAYS

## 2025-01-08 ASSESSMENT — ANXIETY QUESTIONNAIRES
2. NOT BEING ABLE TO STOP OR CONTROL WORRYING: SEVERAL DAYS
7. FEELING AFRAID AS IF SOMETHING AWFUL MIGHT HAPPEN: NOT AT ALL
3. WORRYING TOO MUCH ABOUT DIFFERENT THINGS: SEVERAL DAYS
GAD7 TOTAL SCORE: 8
IF YOU CHECKED OFF ANY PROBLEMS ON THIS QUESTIONNAIRE, HOW DIFFICULT HAVE THESE PROBLEMS MADE IT FOR YOU TO DO YOUR WORK, TAKE CARE OF THINGS AT HOME, OR GET ALONG WITH OTHER PEOPLE: SOMEWHAT DIFFICULT
GAD7 TOTAL SCORE: 8
6. BECOMING EASILY ANNOYED OR IRRITABLE: MORE THAN HALF THE DAYS
5. BEING SO RESTLESS THAT IT IS HARD TO SIT STILL: SEVERAL DAYS
1. FEELING NERVOUS, ANXIOUS, OR ON EDGE: SEVERAL DAYS

## 2025-01-08 ASSESSMENT — SOCIAL DETERMINANTS OF HEALTH (SDOH): HOW OFTEN DO YOU GET TOGETHER WITH FRIENDS OR RELATIVES?: ONCE A WEEK

## 2025-01-08 NOTE — PATIENT INSTRUCTIONS
Patient Education   Preventive Care Advice   This is general advice given by our system to help you stay healthy. However, your care team may have specific advice just for you. Please talk to your care team about your preventive care needs.  Nutrition  Eat 5 or more servings of fruits and vegetables each day.  Try wheat bread, brown rice and whole grain pasta (instead of white bread, rice, and pasta).  Get enough calcium and vitamin D. Check the label on foods and aim for 100% of the RDA (recommended daily allowance).  Lifestyle  Exercise at least 150 minutes each week  (30 minutes a day, 5 days a week).  Do muscle strengthening activities 2 days a week. These help control your weight and prevent disease.  No smoking.  Wear sunscreen to prevent skin cancer.  Have a dental exam and cleaning every 6 months.  Yearly exams  See your health care team every year to talk about:  Any changes in your health.  Any medicines your care team has prescribed.  Preventive care, family planning, and ways to prevent chronic diseases.  Shots (vaccines)   HPV shots (up to age 26), if you've never had them before.  Hepatitis B shots (up to age 59), if you've never had them before.  COVID-19 shot: Get this shot when it's due.  Flu shot: Get a flu shot every year.  Tetanus shot: Get a tetanus shot every 10 years.  Pneumococcal, hepatitis A, and RSV shots: Ask your care team if you need these based on your risk.  Shingles shot (for age 50 and up)  General health tests  Diabetes screening:  Starting at age 35, Get screened for diabetes at least every 3 years.  If you are younger than age 35, ask your care team if you should be screened for diabetes.  Cholesterol test: At age 39, start having a cholesterol test every 5 years, or more often if advised.  Bone density scan (DEXA): At age 50, ask your care team if you should have this scan for osteoporosis (brittle bones).  Hepatitis C: Get tested at least once in your life.  STIs (sexually  transmitted infections)  Before age 24: Ask your care team if you should be screened for STIs.  After age 24: Get screened for STIs if you're at risk. You are at risk for STIs (including HIV) if:  You are sexually active with more than one person.  You don't use condoms every time.  You or a partner was diagnosed with a sexually transmitted infection.  If you are at risk for HIV, ask about PrEP medicine to prevent HIV.  Get tested for HIV at least once in your life, whether you are at risk for HIV or not.  Cancer screening tests  Cervical cancer screening: If you have a cervix, begin getting regular cervical cancer screening tests starting at age 21.  Breast cancer scan (mammogram): If you've ever had breasts, begin having regular mammograms starting at age 40. This is a scan to check for breast cancer.  Colon cancer screening: It is important to start screening for colon cancer at age 45.  Have a colonoscopy test every 10 years (or more often if you're at risk) Or, ask your provider about stool tests like a FIT test every year or Cologuard test every 3 years.  To learn more about your testing options, visit:   .  For help making a decision, visit:   https://bit.ly/oe99772.  Prostate cancer screening test: If you have a prostate, ask your care team if a prostate cancer screening test (PSA) at age 55 is right for you.  Lung cancer screening: If you are a current or former smoker ages 50 to 80, ask your care team if ongoing lung cancer screenings are right for you.  For informational purposes only. Not to replace the advice of your health care provider. Copyright   2023 [facility]. All rights reserved. Clinically reviewed by the [facility]. [employer] 178467 - REV 01/24.  Learning About Stress  What is stress?     Stress is your body's response to a hard situation. Your body can have a physical, emotional, or mental response. Stress is a fact of life for most people, and it  affects everyone differently. What causes stress for you may not be stressful for someone else.  A lot of things can cause stress. You may feel stress when you go on a job interview, take a test, or run a race. This kind of short-term stress is normal and even useful. It can help you if you need to work hard or react quickly. For example, stress can help you finish an important job on time.  Long-term stress is caused by ongoing stressful situations or events. Examples of long-term stress include long-term health problems, ongoing problems at work, or conflicts in your family. Long-term stress can harm your health.  How does stress affect your health?  When you are stressed, your body responds as though you are in danger. It makes hormones that speed up your heart, make you breathe faster, and give you a burst of energy. This is called the fight-or-flight stress response. If the stress is over quickly, your body goes back to normal and no harm is done.  But if stress happens too often or lasts too long, it can have bad effects. Long-term stress can make you more likely to get sick, and it can make symptoms of some diseases worse. If you tense up when you are stressed, you may develop neck, shoulder, or low back pain. Stress is linked to high blood pressure and heart disease.  Stress also harms your emotional health. It can make you butcher, tense, or depressed. Your relationships may suffer, and you may not do well at work or school.  What can you do to manage stress?  You can try these things to help manage stress:   Do something active. Exercise or activity can help reduce stress. Walking is a great way to get started. Even everyday activities such as housecleaning or yard work can help.  Try yoga or ry chi. These techniques combine exercise and meditation. You may need some training at first to learn them.  Do something you enjoy. For example, listen to music or go to a movie. Practice your hobby or do volunteer  "work.  Meditate. This can help you relax, because you are not worrying about what happened before or what may happen in the future.  Do guided imagery. Imagine yourself in any setting that helps you feel calm. You can use online videos, books, or a teacher to guide you.  Do breathing exercises. For example:  From a standing position, bend forward from the waist with your knees slightly bent. Let your arms dangle close to the floor.  Breathe in slowly and deeply as you return to a standing position. Roll up slowly and lift your head last.  Hold your breath for just a few seconds in the standing position.  Breathe out slowly and bend forward from the waist.  Let your feelings out. Talk, laugh, cry, and express anger when you need to. Talking with supportive friends or family, a counselor, or a seng leader about your feelings is a healthy way to relieve stress. Avoid discussing your feelings with people who make you feel worse.  Write. It may help to write about things that are bothering you. This helps you find out how much stress you feel and what is causing it. When you know this, you can find better ways to cope.  What can you do to prevent stress?  You might try some of these things to help prevent stress:  Manage your time. This helps you find time to do the things you want and need to do.  Get enough sleep. Your body recovers from the stresses of the day while you are sleeping.  Get support. Your family, friends, and community can make a difference in how you experience stress.  Limit your news feed. Avoid or limit time on social media or news that may make you feel stressed.  Do something active. Exercise or activity can help reduce stress. Walking is a great way to get started.  Where can you learn more?  Go to https://www.[employer].net/patiented  Enter N032 in the search box to learn more about \"Learning About Stress.\"  Current as of: October 24, 2023  Content Version: 14.3    2024 [employer]. "   Care instructions adapted under license by your healthcare professional. If you have questions about a medical condition or this instruction, always ask your healthcare professional. [employer] disclaims any warranty or liability for your use of this information.

## 2025-01-08 NOTE — PROGRESS NOTES
Preventive Care Visit  [facility]  Martine Hand MD, Family Medicine  Jan 8, 2025      Assessment & Plan     Routine general medical examination at a health care facility    Generalized anxiety disorder  The current medical regimen is effective;  continue present plan and medications.  - citalopram (CELEXA) 20 MG tablet; Take 1 tablet (20 mg) by mouth daily.    Migraine without aura and without status migrainosus, not intractable  Chronic, not controlled. Encouraged her to schedule with neurology to discuss preventative treatment options. She had previously wanted to avoid Botox injections but is now open to that as a treatment option if it will help.   - rizatriptan (MAXALT-MLT) 10 MG ODT; Take 1 tablet (10 mg) by mouth at onset of headache for migraine. May repeat in 2 hours. Max 3 tablets/24 hours.    Counseling  Appropriate preventive services were addressed with this patient via screening, questionnaire, or discussion as appropriate for fall prevention, nutrition, physical activity, Tobacco-use cessation, social engagement, weight loss and cognition.  Checklist reviewing preventive services available has been given to the patient.  Reviewed patient's diet, addressing concerns and/or questions.   She is at risk for psychosocial distress and has been provided with information to reduce risk.     Weight management plan: Discussed healthy diet and exercise guidelines    Return in about 53 weeks (around 1/14/2026) for Annual Wellness Visit.    Guillermo Magallon is a 39 year old, presenting for the following:  Physical       HPI    Patient Active Problem List    Diagnosis Date Noted    Migraine without aura and without status migrainosus, not intractable 01/29/2018     Priority: Medium     Since middle school.   Sees neurology. Takes rizatriptan as needed.  Also takes magnesium at bedtime.   Has been gluten-free since Aug which did seem to work at first.   Previously took Coenzyme Q10, riboflavin, and  Emgality without improvement.  Went away during her first pregnancy, but during her 2nd pregnancy and post-partum she had a lot of headaches.   Triggers: stress, menses.       Generalized anxiety disorder 01/29/2018     Priority: Medium     Citalopram daily since 2/2022. Has not had any other medication trials.   Sees a therapist regularly.  Tried to taper off this summer but didn't go well - worse anxiety, irritability. So went back to 20 mg daily.        Weight has gone up - not sure why she's gaining weight.   Starts more strength training over the last year.   Wt Readings from Last 10 Encounters:   01/08/25 73.5 kg (162 lb)   11/15/23 67.1 kg (148 lb)   11/10/22 66.2 kg (146 lb)   05/23/22 63.5 kg (140 lb)   01/28/22 64.4 kg (142 lb)   10/26/21 64.5 kg (142 lb 3.2 oz)   09/07/21 70.9 kg (156 lb 6.4 oz)   09/01/21 70.3 kg (155 lb)   08/20/21 69.4 kg (153 lb 1.6 oz)   08/13/21 69.4 kg (152 lb 14.4 oz)           1/8/2025   General Health   How would you rate your overall physical health? Good   Feel stress (tense, anxious, or unable to sleep) Rather much   (!) STRESS CONCERN      1/8/2025   Nutrition   Three or more servings of calcium each day? Yes   Diet: Gluten-free/reduced   How many servings of fruit and vegetables per day? (!) 2-3   How many sweetened beverages each day? 0-1         1/8/2025   Exercise   Days per week of moderate/strenous exercise 4 days         1/8/2025   Social Factors   Frequency of gathering with friends or relatives Once a week   Worry food won't last until get money to buy more No   Food not last or not have enough money for food? No   Do you have housing? (Housing is defined as stable permanent housing and does not include staying ouside in a car, in a tent, in an abandoned building, in an overnight shelter, or couch-surfing.) Yes   Are you worried about losing your housing? No   Lack of transportation? No   Unable to get utilities (heat,electricity)? No         1/8/2025   Dental  "  Dentist two times every year? Yes         1/8/2025   TB Screening   Were you born outside of the [place]? No       Today's PHQ-2 Score:       1/8/2025     1:31 PM   PHQ-2 ( 1999 [employer])   Q1: Little interest or pleasure in doing things 1   Q2: Feeling down, depressed or hopeless 1   PHQ-2 Score 2    Q1: Little interest or pleasure in doing things Several days   Q2: Feeling down, depressed or hopeless Several days   PHQ-2 Score 2       Patient-reported           1/8/2025   Substance Use   Alcohol more than 3/day or more than 7/wk No   Do you use any other substances recreationally? No     Social History     Tobacco Use    Smoking status: Never    Smokeless tobacco: Never   Vaping Use    Vaping status: Never Used   Substance Use Topics    Alcohol use: Yes     Comment: 3-5 drinks/week    Drug use: No          Mammogram Screening - Patient under 40 years of age: Routine Mammogram Screening not recommended.         1/8/2025   STI Screening   New sexual partner(s) since last STI/HIV test? No     History of abnormal Pap smear: No - age 30- 64 PAP with HPV every 5 years recommended        Latest Ref Rng & Units 11/15/2023    11:11 AM 2/23/2018     9:00 AM 2/23/2018     8:57 AM   PAP / HPV   PAP  Negative for Intraepithelial Lesion or Malignancy (NILM)      PAP (Historical)    NIL    HPV 16 DNA Negative Negative  Negative     HPV 18 DNA Negative Negative  Negative     Other HR HPV Negative Negative  Negative             1/8/2025   Contraception/Family Planning   Questions about contraception or family planning No        Reviewed and updated as needed this visit by Provider   Tobacco    Problems  Med Hx  Surg Hx  Fam Hx             Objective    Exam  /79   Pulse 69   Temp 97.7  F (36.5  C)   Ht 1.676 m (5' 6\")   Wt 73.5 kg (162 lb)   LMP 12/28/2024   SpO2 96%   BMI 26.15 kg/m       Physical Exam  GENERAL: alert and no distress  EYES: Eyes grossly normal to inspection, PERRL and conjunctivae and sclerae " normal  HENT: ear canals and TM's normal, nose and mouth without ulcers or lesions  NECK: no adenopathy, no asymmetry, masses, or scars  RESP: lungs clear to auscultation - no rales, rhonchi or wheezes  BREAST: normal without masses, tenderness or nipple discharge and no palpable axillary masses or adenopathy  CV: regular rate and rhythm, normal S1 S2, no S3 or S4, no murmur, click or rub, no peripheral edema  ABDOMEN: soft, nontender, without hepatosplenomegaly or masses  MS: no gross musculoskeletal defects noted, no edema  SKIN: no suspicious lesions or rashes  NEURO: Normal strength and tone, mentation intact and speech normal  PSYCH: mentation appears normal, affect normal/bright    Signed Electronically by: Martine Hand MD

## 2025-01-15 ENCOUNTER — TRANSFERRED RECORDS (OUTPATIENT)
Dept: HEALTH INFORMATION MANAGEMENT | Facility: CLINIC | Age: 40
End: 2025-01-15

## 2025-05-08 ENCOUNTER — E-VISIT (OUTPATIENT)
Dept: URGENT CARE | Facility: CLINIC | Age: 40
End: 2025-05-08
Payer: COMMERCIAL

## 2025-05-08 DIAGNOSIS — J01.10 ACUTE NON-RECURRENT FRONTAL SINUSITIS: Primary | ICD-10-CM

## 2025-05-08 RX ORDER — FLUTICASONE PROPIONATE 50 MCG
2 SPRAY, SUSPENSION (ML) NASAL DAILY
Qty: 16 G | Refills: 0 | Status: SHIPPED | OUTPATIENT
Start: 2025-05-08

## 2025-05-08 RX ORDER — FLUTICASONE PROPIONATE 50 MCG
1 SPRAY, SUSPENSION (ML) NASAL DAILY
COMMUNITY
Start: 2025-05-08 | End: 2025-05-08

## 2025-05-08 NOTE — PATIENT INSTRUCTIONS
Acute Sinusitis: Care Instructions  Overview     Acute sinusitis is an inflammation of the mucous membranes inside the nose and sinuses. Sinuses are the hollow spaces in your skull around the eyes and nose. Acute sinusitis often follows a cold. Acute sinusitis causes thick, discolored mucus that drains from the nose or down the back of the throat. It also can cause pain and pressure in your head and face along with a stuffy or blocked nose.  In most cases, sinusitis gets better on its own in 1 to 2 weeks. But some mild symptoms may last for several weeks. Sometimes antibiotics are needed if there is a bacterial infection.  Follow-up care is a key part of your treatment and safety. Be sure to make and go to all appointments, and call your doctor if you are having problems. It's also a good idea to know your test results and keep a list of the medicines you take.  How can you care for yourself at home?  Use saline (saltwater) nasal washes. This can help keep your nasal passages open and wash out mucus and allergens.  You can buy saline nose washes at a grocery store or drugstore. Follow the instructions on the package.  You can make your own at home. Add 1 teaspoon of non-iodized salt and 1 teaspoon of baking soda to 2 cups of distilled or boiled and cooled water. Fill a squeeze bottle or a nasal cleansing pot (such as a neti pot) with the nasal wash. Then put the tip into your nostril, and lean over the sink. With your mouth open, gently squirt the liquid. Repeat on the other side.  Try a decongestant nasal spray like oxymetazoline (Afrin). Do not use it for more than 3 days in a row. Using it for more than 3 days can make your congestion worse.  If needed, take an over-the-counter pain medicine, such as acetaminophen (Tylenol), ibuprofen (Advil, Motrin), or naproxen (Aleve). Read and follow all instructions on the label.  If the doctor prescribed antibiotics, take them as directed. Do not stop taking them just  "because you feel better. You need to take the full course of antibiotics.  Be careful when taking over-the-counter cold or flu medicines and Tylenol at the same time. Many of these medicines have acetaminophen, which is Tylenol. Read the labels to make sure that you are not taking more than the recommended dose. Too much acetaminophen (Tylenol) can be harmful.  Try a steroid nasal spray. It may help with your symptoms.  Breathe warm, moist air. You can use a steamy shower, a hot bath, or a sink filled with hot water. Avoid cold, dry air. Using a humidifier in your home may help. Follow the directions for cleaning the machine.  When should you call for help?   Call your doctor now or seek immediate medical care if:    You have new or worse swelling, redness, or pain in your face or around one or both of your eyes.     You have double vision or a change in your vision.     You have a high fever.     You have a severe headache and a stiff neck.     You have mental changes, such as feeling confused or much less alert.   Watch closely for changes in your health, and be sure to contact your doctor if:    You are not getting better as expected.   Where can you learn more?  Go to https://www.[employer].net/patiented  Enter I933 in the search box to learn more about \"Acute Sinusitis: Care Instructions.\"  Current as of: October 27, 2024  Content Version: 14.4    9017-5282 [employer].   Care instructions adapted under license by your healthcare professional. If you have questions about a medical condition or this instruction, always ask your healthcare professional. [employer] disclaims any warranty or liability for your use of this information.     The symptoms you describe suggest a viral cause, which is much more common than a bacterial cause. Antibiotics will treat bacterial infections, but have no effect on viral infections. If possible, especially if improving, start with symptom care for the " first 7-10 days, then consider seeking further treatment or taking an antibiotic. Bacterial infections generally are more severe, including symptoms such as pus, fever over 101degrees F, or rapidly worsening.  Thank you for choosing us for your care. I have placed an order for a prescription so that you can start treatment:  Orders Placed This Encounter   Medications     fluticasone (FLONASE) 50 MCG/ACT nasal spray     Sig: Spray 1 spray into both nostrils daily.        View your full visit summary for details by clicking on the link below. Your pharmacist will able to address any questions you may have about the medication.     If you're not feeling better within 5-7 days, please schedule an appointment.  You can schedule an appointment right here in [facility], or call [phone]  If the visit is for the same symptoms as your eVisit, we'll refund the cost of your eVisit if seen within seven days.

## 2025-07-16 ENCOUNTER — MYC REFILL (OUTPATIENT)
Dept: FAMILY MEDICINE | Facility: CLINIC | Age: 40
End: 2025-07-16

## 2025-07-16 DIAGNOSIS — G43.009 MIGRAINE WITHOUT AURA AND WITHOUT STATUS MIGRAINOSUS, NOT INTRACTABLE: ICD-10-CM

## 2025-07-16 RX ORDER — RIZATRIPTAN BENZOATE 10 MG/1
10 TABLET, ORALLY DISINTEGRATING ORAL
Qty: 18 TABLET | Refills: 4 | Status: SHIPPED | OUTPATIENT
Start: 2025-07-16

## 2025-07-16 NOTE — TELEPHONE ENCOUNTER
Rizatriptan (Maxalt) 10 mg    Last Office Visit: 2/21/25  [facility] Appointments: None  Medication last refilled: 1/8/25 #18 with 4 refill(s)    Vital Signs Systolic Diastolic   2/21/25 114 76   11/15/23 109 72     Prescription approved per [facility] Refill Protocol.    SON LuoN, RN, CCM

## 2025-07-29 ENCOUNTER — TELEPHONE (OUTPATIENT)
Dept: NEUROLOGY | Facility: CLINIC | Age: 40
End: 2025-07-29
Payer: COMMERCIAL

## 2025-07-29 NOTE — TELEPHONE ENCOUNTER
[facility] Call Center    Phone Message    May a detailed message be left on voicemail: yes     Reason for Call: Other:       Pt requesting call back to discuss getting started on the Botox injections. Pt states they have previously talked about doing these injections at past appointments and now the pt is ready.     Pt's call back # [phone]    Action Taken: Message routed to:  [facility] ([facility] Neurology    Travel Screening: Not Applicable

## 2025-07-29 NOTE — TELEPHONE ENCOUNTER
Attempted to call DEBRA Magallon informing her that her LOV was June 2023 and she needs to make another appointment to discuss Botox injections.     Will also send Segment message to patient with above message.    Haley Urbina RN, BSN  [facility] Neurology